# Patient Record
Sex: FEMALE | Race: WHITE | NOT HISPANIC OR LATINO | Employment: FULL TIME | URBAN - METROPOLITAN AREA
[De-identification: names, ages, dates, MRNs, and addresses within clinical notes are randomized per-mention and may not be internally consistent; named-entity substitution may affect disease eponyms.]

---

## 2017-03-03 ENCOUNTER — OFFICE VISIT - HEALTHEAST (OUTPATIENT)
Dept: FAMILY MEDICINE | Facility: CLINIC | Age: 32
End: 2017-03-03

## 2017-03-03 DIAGNOSIS — J32.9 SINUSITIS: ICD-10-CM

## 2017-03-13 ENCOUNTER — OFFICE VISIT - HEALTHEAST (OUTPATIENT)
Dept: FAMILY MEDICINE | Facility: CLINIC | Age: 32
End: 2017-03-13

## 2017-03-13 DIAGNOSIS — J01.90 ACUTE SINUSITIS: ICD-10-CM

## 2017-03-13 DIAGNOSIS — H65.90 SEROUS OTITIS MEDIA: ICD-10-CM

## 2017-05-01 ENCOUNTER — OFFICE VISIT - HEALTHEAST (OUTPATIENT)
Dept: FAMILY MEDICINE | Facility: CLINIC | Age: 32
End: 2017-05-01

## 2017-05-01 DIAGNOSIS — J32.9 SINUSITIS: ICD-10-CM

## 2017-05-01 DIAGNOSIS — Z87.09 HISTORY OF ASTHMA: ICD-10-CM

## 2017-05-01 DIAGNOSIS — Z87.19 HISTORY OF CROHN'S DISEASE: ICD-10-CM

## 2017-07-07 ENCOUNTER — OFFICE VISIT - HEALTHEAST (OUTPATIENT)
Dept: FAMILY MEDICINE | Facility: CLINIC | Age: 32
End: 2017-07-07

## 2017-07-07 DIAGNOSIS — G44.009 CLUSTER HEADACHE: ICD-10-CM

## 2017-10-17 ENCOUNTER — OFFICE VISIT - HEALTHEAST (OUTPATIENT)
Dept: FAMILY MEDICINE | Facility: CLINIC | Age: 32
End: 2017-10-17

## 2017-10-17 ENCOUNTER — COMMUNICATION - HEALTHEAST (OUTPATIENT)
Dept: TELEHEALTH | Facility: CLINIC | Age: 32
End: 2017-10-17

## 2017-10-17 DIAGNOSIS — Z87.09 HISTORY OF ASTHMA: ICD-10-CM

## 2017-10-17 DIAGNOSIS — Z00.00 ROUTINE GENERAL MEDICAL EXAMINATION AT A HEALTH CARE FACILITY: ICD-10-CM

## 2017-10-17 DIAGNOSIS — Z13.220 SCREENING CHOLESTEROL LEVEL: ICD-10-CM

## 2017-10-17 DIAGNOSIS — Z86.59 H/O: DEPRESSION: ICD-10-CM

## 2017-10-17 DIAGNOSIS — E55.9 VITAMIN D DEFICIENCY: ICD-10-CM

## 2017-10-17 DIAGNOSIS — Z12.4 CERVICAL CANCER SCREENING: ICD-10-CM

## 2017-10-17 DIAGNOSIS — Z87.19 HISTORY OF CROHN'S DISEASE: ICD-10-CM

## 2017-10-17 DIAGNOSIS — Z23 NEED FOR IMMUNIZATION AGAINST INFLUENZA: ICD-10-CM

## 2017-10-17 DIAGNOSIS — Z13.1 DIABETES MELLITUS SCREENING: ICD-10-CM

## 2017-10-17 LAB
CHOLEST SERPL-MCNC: 291 MG/DL
FASTING STATUS PATIENT QL REPORTED: YES
HDLC SERPL-MCNC: 39 MG/DL
LDLC SERPL CALC-MCNC: 212 MG/DL
TRIGL SERPL-MCNC: 202 MG/DL

## 2017-10-17 ASSESSMENT — MIFFLIN-ST. JEOR: SCORE: 1454.03

## 2017-10-18 ENCOUNTER — COMMUNICATION - HEALTHEAST (OUTPATIENT)
Dept: FAMILY MEDICINE | Facility: CLINIC | Age: 32
End: 2017-10-18

## 2017-11-06 ENCOUNTER — OFFICE VISIT - HEALTHEAST (OUTPATIENT)
Dept: FAMILY MEDICINE | Facility: CLINIC | Age: 32
End: 2017-11-06

## 2017-11-06 DIAGNOSIS — R06.2 WHEEZING: ICD-10-CM

## 2017-11-06 DIAGNOSIS — J01.90 ACUTE SINUSITIS: ICD-10-CM

## 2017-11-21 ENCOUNTER — OFFICE VISIT - HEALTHEAST (OUTPATIENT)
Dept: FAMILY MEDICINE | Facility: CLINIC | Age: 32
End: 2017-11-21

## 2017-11-21 ENCOUNTER — RECORDS - HEALTHEAST (OUTPATIENT)
Dept: ADMINISTRATIVE | Facility: OTHER | Age: 32
End: 2017-11-21

## 2017-11-21 DIAGNOSIS — J45.909 REACTIVE AIRWAY DISEASE: ICD-10-CM

## 2017-11-21 DIAGNOSIS — Z30.017 NEXPLANON INSERTION: ICD-10-CM

## 2017-11-21 DIAGNOSIS — Z87.19 HISTORY OF CROHN'S DISEASE: ICD-10-CM

## 2017-11-21 DIAGNOSIS — Z87.09 HISTORY OF ASTHMA: ICD-10-CM

## 2017-11-21 DIAGNOSIS — R05.3 PERSISTENT COUGH FOR 3 WEEKS OR LONGER: ICD-10-CM

## 2017-11-25 ENCOUNTER — COMMUNICATION - HEALTHEAST (OUTPATIENT)
Dept: FAMILY MEDICINE | Facility: CLINIC | Age: 32
End: 2017-11-25

## 2017-11-25 DIAGNOSIS — J45.31 MILD PERSISTENT REACTIVE AIRWAY DISEASE WITH ACUTE EXACERBATION: ICD-10-CM

## 2017-12-05 ENCOUNTER — COMMUNICATION - HEALTHEAST (OUTPATIENT)
Dept: FAMILY MEDICINE | Facility: CLINIC | Age: 32
End: 2017-12-05

## 2017-12-05 DIAGNOSIS — Z11.1 SCREENING-PULMONARY TB: ICD-10-CM

## 2017-12-08 ENCOUNTER — AMBULATORY - HEALTHEAST (OUTPATIENT)
Dept: LAB | Facility: CLINIC | Age: 32
End: 2017-12-08

## 2017-12-08 DIAGNOSIS — Z11.1 SCREENING-PULMONARY TB: ICD-10-CM

## 2017-12-11 ENCOUNTER — COMMUNICATION - HEALTHEAST (OUTPATIENT)
Dept: FAMILY MEDICINE | Facility: CLINIC | Age: 32
End: 2017-12-11

## 2018-08-27 ENCOUNTER — RECORDS - HEALTHEAST (OUTPATIENT)
Dept: LAB | Facility: HOSPITAL | Age: 33
End: 2018-08-27

## 2018-08-28 LAB
GAMMA INTERFERON BACKGROUND BLD IA-ACNC: 0.06 IU/ML
M TB IFN-G BLD-IMP: NEGATIVE
MITOGEN IGNF BCKGRD COR BLD-ACNC: 0 IU/ML
MITOGEN IGNF BCKGRD COR BLD-ACNC: 0.02 IU/ML
QTF INTERPRETATION: NORMAL
QTF MITOGEN - NIL: >10 IU/ML

## 2019-03-24 ENCOUNTER — OFFICE VISIT - HEALTHEAST (OUTPATIENT)
Dept: FAMILY MEDICINE | Facility: CLINIC | Age: 34
End: 2019-03-24

## 2019-03-24 DIAGNOSIS — M25.449 SWELLING OF FINGER JOINT, UNSPECIFIED LATERALITY: ICD-10-CM

## 2019-03-24 DIAGNOSIS — Z87.19 HISTORY OF CROHN'S DISEASE: ICD-10-CM

## 2019-03-25 ENCOUNTER — COMMUNICATION - HEALTHEAST (OUTPATIENT)
Dept: LAB | Facility: CLINIC | Age: 34
End: 2019-03-25

## 2019-03-26 ENCOUNTER — AMBULATORY - HEALTHEAST (OUTPATIENT)
Dept: FAMILY MEDICINE | Facility: CLINIC | Age: 34
End: 2019-03-26

## 2019-03-26 ENCOUNTER — AMBULATORY - HEALTHEAST (OUTPATIENT)
Dept: LAB | Facility: CLINIC | Age: 34
End: 2019-03-26

## 2019-03-26 DIAGNOSIS — M25.449 SWELLING OF FINGER JOINT, UNSPECIFIED LATERALITY: ICD-10-CM

## 2019-03-26 DIAGNOSIS — M79.645 PAIN OF FINGER OF LEFT HAND: ICD-10-CM

## 2019-03-26 DIAGNOSIS — Z87.19 HISTORY OF CROHN'S DISEASE: ICD-10-CM

## 2019-03-26 LAB
C REACTIVE PROTEIN LHE: <0.1 MG/DL (ref 0–0.8)
ERYTHROCYTE [SEDIMENTATION RATE] IN BLOOD BY WESTERGREN METHOD: 27 MM/HR (ref 0–20)
RHEUMATOID FACT SERPL-ACNC: <15 IU/ML (ref 0–30)

## 2019-03-28 ENCOUNTER — COMMUNICATION - HEALTHEAST (OUTPATIENT)
Dept: FAMILY MEDICINE | Facility: CLINIC | Age: 34
End: 2019-03-28

## 2019-03-28 LAB — ANA SER QL: 1.1 U

## 2019-04-01 ENCOUNTER — OFFICE VISIT - HEALTHEAST (OUTPATIENT)
Dept: FAMILY MEDICINE | Facility: CLINIC | Age: 34
End: 2019-04-01

## 2019-04-01 DIAGNOSIS — K50.00 CROHN'S DISEASE OF SMALL INTESTINE WITHOUT COMPLICATION (H): ICD-10-CM

## 2019-04-01 DIAGNOSIS — M25.50 MULTIPLE JOINT PAIN: ICD-10-CM

## 2019-04-01 DIAGNOSIS — G56.03 BILATERAL CARPAL TUNNEL SYNDROME: ICD-10-CM

## 2019-04-01 DIAGNOSIS — F33.0 DEPRESSION, MAJOR, RECURRENT, MILD (H): ICD-10-CM

## 2019-04-01 ASSESSMENT — MIFFLIN-ST. JEOR: SCORE: 1497.44

## 2019-04-05 ENCOUNTER — COMMUNICATION - HEALTHEAST (OUTPATIENT)
Dept: FAMILY MEDICINE | Facility: CLINIC | Age: 34
End: 2019-04-05

## 2019-04-05 DIAGNOSIS — M25.50 MULTIPLE JOINT PAIN: ICD-10-CM

## 2019-05-14 ENCOUNTER — RECORDS - HEALTHEAST (OUTPATIENT)
Dept: ADMINISTRATIVE | Facility: OTHER | Age: 34
End: 2019-05-14

## 2019-07-15 ENCOUNTER — RECORDS - HEALTHEAST (OUTPATIENT)
Dept: GENERAL RADIOLOGY | Facility: CLINIC | Age: 34
End: 2019-07-15

## 2019-07-15 DIAGNOSIS — M25.50 PAIN IN UNSPECIFIED JOINT: ICD-10-CM

## 2019-07-15 DIAGNOSIS — M54.50 LOW BACK PAIN: ICD-10-CM

## 2019-07-16 ENCOUNTER — RECORDS - HEALTHEAST (OUTPATIENT)
Dept: ADMINISTRATIVE | Facility: OTHER | Age: 34
End: 2019-07-16

## 2019-08-23 ENCOUNTER — COMMUNICATION - HEALTHEAST (OUTPATIENT)
Dept: FAMILY MEDICINE | Facility: CLINIC | Age: 34
End: 2019-08-23

## 2019-08-23 DIAGNOSIS — M25.50 MULTIPLE JOINT PAIN: ICD-10-CM

## 2019-10-02 ENCOUNTER — OFFICE VISIT (OUTPATIENT)
Dept: URGENT CARE | Facility: URGENT CARE | Age: 34
End: 2019-10-02
Payer: COMMERCIAL

## 2019-10-02 VITALS
WEIGHT: 182.5 LBS | SYSTOLIC BLOOD PRESSURE: 133 MMHG | HEART RATE: 102 BPM | BODY MASS INDEX: 32.33 KG/M2 | OXYGEN SATURATION: 98 % | TEMPERATURE: 98.6 F | DIASTOLIC BLOOD PRESSURE: 72 MMHG

## 2019-10-02 DIAGNOSIS — R21 RASH AND NONSPECIFIC SKIN ERUPTION: Primary | ICD-10-CM

## 2019-10-02 PROCEDURE — 99203 OFFICE O/P NEW LOW 30 MIN: CPT | Performed by: FAMILY MEDICINE

## 2019-10-02 RX ORDER — DOXYCYCLINE 100 MG/1
100 CAPSULE ORAL 2 TIMES DAILY
Qty: 20 CAPSULE | Refills: 0 | Status: SHIPPED | OUTPATIENT
Start: 2019-10-02 | End: 2019-10-12

## 2019-10-03 NOTE — PROGRESS NOTES
SUBJECTIVE:  Sherri Lynn is a 33 year old female who presents to the clinic today for possible skin infection.    Patient obtained flu vaccination of right shoulder yesterday.  This morning had some discomfort.  Patient noticed redness this evening.  Endorsed more painful and feels more hot and is very concerned that has cellulitis.  Patient states that the swelling did seem to improve but rash has gotten worse.  Denies any prior problems with flu vaccination.  Patient has been applying ice to area.  Unable to tolerate NSAID due to Crohn's.    Medical history - Crohn's    No past medical history on file.  Current Outpatient Medications   Medication Sig Dispense Refill     Acetaminophen (TYLENOL PO)        fluticasone (FLONASE) 50 MCG/ACT nasal spray Spray 2 sprays into both nostrils daily 16 g 0     fluticasone (FLOVENT HFA) 110 MCG/ACT inhaler Inhale 2 puffs into the lungs 2 times daily       Social History     Tobacco Use     Smoking status: Never Smoker   Substance Use Topics     Alcohol use: Yes       ROS:  Review of systems negative except as stated above.    EXAM:   /72   Pulse 102   Temp 98.6  F (37  C)   Wt 82.8 kg (182 lb 8 oz)   SpO2 98%   BMI 32.33 kg/m    GENERAL: alert, no acute distress.  SKIN: irregular pink patch on right shoulder, mild warmth.  No scaling, no blister or vesicle.  Mild tenderness   PSYCH: mentation appears normal and affect normal/bright    ASSESSMENT/PLAN:  (R21) Rash and nonspecific skin eruption  (primary encounter diagnosis)  Comment: right shoulder, s/p flu vaccination  Plan: doxycycline hyclate (VIBRAMYCIN) 100 MG capsule            Probable reaction to vaccination as just received injection yesterday.  Recommend to monitor for now, border marked.  Okay for tylenol for discomfort and benadryl for possible reaction.  RX Doxycycline printed and given to patient to fill and take if rash worsens in the next 2-3 days.    Follow up with primary provider if no  improvement of symptoms on antibiotic.    Isra Faye MD, MD  October 2, 2019 8:08 PM

## 2019-12-17 ENCOUNTER — OFFICE VISIT (OUTPATIENT)
Dept: URGENT CARE | Facility: URGENT CARE | Age: 34
End: 2019-12-17
Payer: COMMERCIAL

## 2019-12-17 VITALS
OXYGEN SATURATION: 99 % | HEART RATE: 103 BPM | DIASTOLIC BLOOD PRESSURE: 68 MMHG | SYSTOLIC BLOOD PRESSURE: 108 MMHG | TEMPERATURE: 98.7 F

## 2019-12-17 DIAGNOSIS — M19.90 INFLAMMATORY ARTHRITIS: Primary | ICD-10-CM

## 2019-12-17 DIAGNOSIS — M70.62 TROCHANTERIC BURSITIS OF LEFT HIP: ICD-10-CM

## 2019-12-17 PROCEDURE — 99214 OFFICE O/P EST MOD 30 MIN: CPT | Performed by: FAMILY MEDICINE

## 2019-12-17 RX ORDER — HYDROXYCHLOROQUINE SULFATE 200 MG/1
400 TABLET, FILM COATED ORAL 2 TIMES DAILY
COMMUNITY
Start: 2019-12-13 | End: 2021-11-18

## 2019-12-17 RX ORDER — PREDNISONE 20 MG/1
20 TABLET ORAL DAILY
Qty: 5 TABLET | Refills: 0 | Status: SHIPPED | OUTPATIENT
Start: 2019-12-17 | End: 2019-12-22

## 2019-12-17 RX ORDER — FLUTICASONE PROPIONATE 50 MCG
SPRAY, SUSPENSION (ML) NASAL
COMMUNITY
End: 2020-10-11

## 2019-12-18 NOTE — PROGRESS NOTES
SUBJECTIVE:  Chief Complaint   Patient presents with     Urgent Care     Musculoskeletal Problem     left hip pain that radiates down her leg     Sherri Lynn is a 33 year old female with a history of unspecific inflammatory arthritis who presents with a chief complaint of left hip pain, both in the joint and along the lateral aspect of the leg.  Symptoms began a few days ago and have been worsening    Context:  Injury:No.  Pain exacerbated by walking and movement Relieved by nothing.  She treated it initially with essential oils topically (including birch, which contains natural salicylates).  She also takes Plaquenil. This is not the first time this type of injury has occurred to this patient, but usually it affects her hands and her knees..     No past medical history on file.   Crohn's disease    Social History     Tobacco Use     Smoking status: Never Smoker     Smokeless tobacco: Never Used   Substance Use Topics     Alcohol use: Yes       ROS:  No fevers.  Mild stomach upset, but just returned from a trip to [place].  Probiotics seem to be helping the GI upset.    EXAM:   /68   Pulse 103   Temp 98.7  F (37.1  C)   SpO2 99%   M/S Exam:L hip does not have any significant decrease in ROM, some pain with external rotation at the hip.  Tender in the lower portion of the IT band as well as over the trochanteric bursa on the left.  Mild L pyriformis tenderness as well.  Full ROM of L knee.  GENERAL APPEARANCE: healthy, alert and no distress  NEURO: Normal strength and tone, sensory exam grossly normal, mentation intact and speech normal    X-RAY was not done.    ASSESSMENT:     Encounter Diagnosis   Name Primary?     Inflammatory arthritis Yes       PLAN:  Prednisone burst:  20 mg daily x 5 days.  Continue using anti-inflammatory EO blend topically.  Encouraged pt to add chamomile as additional anti-inflammatory component.  Follow up with primary MD and rheum to let them know about flare.

## 2019-12-18 NOTE — PATIENT INSTRUCTIONS
Prednisone 20 mg daily for 5 days.    Follow up with regular doctor if possible over the next week.

## 2019-12-18 NOTE — NURSING NOTE
Chief Complaint   Patient presents with     Urgent Care     Musculoskeletal Problem     left hip pain that radiates down her leg        S JOSE EDUARDO, CMA

## 2019-12-23 ENCOUNTER — OFFICE VISIT (OUTPATIENT)
Dept: URGENT CARE | Facility: URGENT CARE | Age: 34
End: 2019-12-23
Payer: COMMERCIAL

## 2019-12-23 VITALS — WEIGHT: 189 LBS | OXYGEN SATURATION: 99 % | HEART RATE: 77 BPM | BODY MASS INDEX: 33.48 KG/M2

## 2019-12-23 DIAGNOSIS — R05.9 COUGH: ICD-10-CM

## 2019-12-23 DIAGNOSIS — J40 BRONCHITIS: Primary | ICD-10-CM

## 2019-12-23 LAB
FLUAV+FLUBV AG SPEC QL: NEGATIVE
FLUAV+FLUBV AG SPEC QL: NEGATIVE
SPECIMEN SOURCE: NORMAL

## 2019-12-23 PROCEDURE — 87804 INFLUENZA ASSAY W/OPTIC: CPT | Performed by: PHYSICIAN ASSISTANT

## 2019-12-23 PROCEDURE — 99214 OFFICE O/P EST MOD 30 MIN: CPT | Performed by: NURSE PRACTITIONER

## 2019-12-23 RX ORDER — ALBUTEROL SULFATE 90 UG/1
2 AEROSOL, METERED RESPIRATORY (INHALATION) EVERY 4 HOURS PRN
Qty: 1 INHALER | Refills: 0 | Status: SHIPPED | OUTPATIENT
Start: 2019-12-23 | End: 2021-11-18

## 2019-12-23 RX ORDER — IPRATROPIUM BROMIDE AND ALBUTEROL SULFATE 2.5; .5 MG/3ML; MG/3ML
1 SOLUTION RESPIRATORY (INHALATION) EVERY 4 HOURS PRN
Qty: 1 BOX | Refills: 0 | Status: SHIPPED | OUTPATIENT
Start: 2019-12-23 | End: 2021-11-18

## 2019-12-23 RX ORDER — PREDNISONE 20 MG/1
40 TABLET ORAL DAILY
Qty: 10 TABLET | Refills: 0 | Status: SHIPPED | OUTPATIENT
Start: 2019-12-23 | End: 2019-12-28

## 2019-12-23 ASSESSMENT — ENCOUNTER SYMPTOMS
RHINORRHEA: 0
HEADACHES: 0
WHEEZING: 1
VOMITING: 0
MYALGIAS: 1
NAUSEA: 0
LIGHT-HEADEDNESS: 1
SORE THROAT: 1
DIARRHEA: 0
COUGH: 1
FEVER: 1

## 2019-12-23 NOTE — PATIENT INSTRUCTIONS
Albuterol as needed every 4 hours for cough, wheeze, or shortness of breath  Prednisone daily for 5 days  Push Fluids  Plenty of rest  Tylenol and ibuprofen to help with pain or fever  Humidified air can help with congestion  Nasal saline or Flonase nasal spray to help with congestion  Salt water gargles, anesthetic throat spray, or lozenges to help with sore throat.

## 2019-12-23 NOTE — PROGRESS NOTES
SUBJECTIVE:   Sherri Lynn is a 34 year old female presenting with a chief complaint of   Chief Complaint   Patient presents with     Urgent Care     URI     cough, wheezing, fever-  x 3.5 days       She is an established patient of [facility].    URI Adult    Onset of symptoms was 3 day(s) ago.  Course of illness is worsening.    Severity moderate  Current and Associated symptoms: fever, cough - non-productive, wheezing, ear pain left and body aches  Treatment measures tried include Inhaler (name: albuterol). Tylenol cough/cold, prednisone  Predisposing factors include ill contact: Work and HX of asthma.        Review of Systems   Constitutional: Positive for fever.   HENT: Positive for ear pain (left) and sore throat. Negative for congestion and rhinorrhea.    Respiratory: Positive for cough and wheezing.    Gastrointestinal: Negative for diarrhea, nausea and vomiting.   Musculoskeletal: Positive for myalgias.   Skin: Negative for rash.   Neurological: Positive for light-headedness. Negative for headaches.       History reviewed. No pertinent past medical history.  History reviewed. No pertinent family history.  Current Outpatient Medications   Medication Sig Dispense Refill     albuterol (PROAIR HFA/PROVENTIL HFA/VENTOLIN HFA) 108 (90 Base) MCG/ACT inhaler Inhale 2 puffs into the lungs every 4 hours as needed for shortness of breath / dyspnea or wheezing 1 Inhaler 0     ipratropium - albuterol 0.5 mg/2.5 mg/3 mL (DUONEB) 0.5-2.5 (3) MG/3ML neb solution Take 1 vial (3 mLs) by nebulization every 4 hours as needed for shortness of breath / dyspnea or wheezing 1 Box 0     predniSONE (DELTASONE) 20 MG tablet Take 2 tablets (40 mg) by mouth daily for 5 days 10 tablet 0     Acetaminophen (TYLENOL PO)        fluticasone (FLONASE) 50 MCG/ACT nasal spray daily as needed.       fluticasone (FLONASE) 50 MCG/ACT nasal spray Spray 2 sprays into both nostrils daily 16 g 0     fluticasone (FLOVENT HFA) 110 MCG/ACT inhaler  Inhale 2 puffs into the lungs 2 times daily       hydroxychloroquine (PLAQUENIL) 200 MG tablet        Nivlub-OtYhv-HcVkvs-FA-Omega (MULTIVITAMIN/MINERALS PO)        Social History     Tobacco Use     Smoking status: Never Smoker     Smokeless tobacco: Never Used   Substance Use Topics     Alcohol use: Yes       OBJECTIVE  BP (P) 112/82   Pulse 77   Temp (P) 98.1  F (36.7  C) (Tympanic)   Resp (P) 14   Wt 85.7 kg (189 lb)   SpO2 99%   BMI 33.48 kg/m      Physical Exam  Vitals signs and nursing note reviewed.   Constitutional:       Appearance: Normal appearance. She is well-developed.   HENT:      Head: Normocephalic and atraumatic.      Right Ear: Ear canal and external ear normal. A middle ear effusion is present.      Left Ear: Ear canal and external ear normal. A middle ear effusion is present.      Nose: Congestion and rhinorrhea present.      Right Sinus: No maxillary sinus tenderness or frontal sinus tenderness.      Left Sinus: No maxillary sinus tenderness or frontal sinus tenderness.      Mouth/Throat:      Pharynx: Posterior oropharyngeal erythema present. No oropharyngeal exudate.   Eyes:      Extraocular Movements: Extraocular movements intact.      Conjunctiva/sclera: Conjunctivae normal.      Pupils: Pupils are equal, round, and reactive to light.   Neck:      Musculoskeletal: Normal range of motion and neck supple.   Cardiovascular:      Rate and Rhythm: Normal rate and regular rhythm.      Heart sounds: Normal heart sounds.   Pulmonary:      Effort: Pulmonary effort is normal.      Breath sounds: Normal breath sounds and air entry.   Lymphadenopathy:      Head:      Right side of head: No submandibular or tonsillar adenopathy.      Left side of head: No submandibular or tonsillar adenopathy.      Cervical: No cervical adenopathy.   Skin:     General: Skin is warm and dry.   Neurological:      Mental Status: She is alert and oriented to person, place, and time.   Psychiatric:         Behavior:  Behavior is cooperative.         Labs:  Results for orders placed or performed in visit on 12/23/19 (from the past 24 hour(s))   Influenza A/B antigen   Result Value Ref Range    Influenza A/B Agn Specimen Nasal     Influenza A Negative NEG^Negative    Influenza B Negative NEG^Negative         ASSESSMENT:      ICD-10-CM    1. Bronchitis J40 predniSONE (DELTASONE) 20 MG tablet   2. Cough R05 Influenza A/B antigen     albuterol (PROAIR HFA/PROVENTIL HFA/VENTOLIN HFA) 108 (90 Base) MCG/ACT inhaler     ipratropium - albuterol 0.5 mg/2.5 mg/3 mL (DUONEB) 0.5-2.5 (3) MG/3ML neb solution        Medical Decision Making:    Differential Diagnosis:  URI Adult/Peds:  Asthma exacerbation, Bronchitis-viral, Influenza, Pneumonia and Viral upper respiratory illness    Serious Comorbid Conditions:  Adult:  Asthma    PLAN:  Discussed with patient that influenza was negative. Favor a viral bronchitis. Will treat with prednisone daily for 5 days. Will refill albuterol inhaler and duonebs. Additional symptomatic treatment recommendations discussed. Education was added to AVS. Patient was agreeable to plan and verbalized understanding.     Followup:    If not improving in 5-7 days or if condition worsens, follow up with your Primary Care Provider    Patient Instructions   Albuterol as needed every 4 hours for cough, wheeze, or shortness of breath  Prednisone daily for 5 days  Push Fluids  Plenty of rest  Tylenol and ibuprofen to help with pain or fever  Humidified air can help with congestion  Nasal saline or Flonase nasal spray to help with congestion  Salt water gargles, anesthetic throat spray, or lozenges to help with sore throat.

## 2019-12-29 ENCOUNTER — ANCILLARY PROCEDURE (OUTPATIENT)
Dept: GENERAL RADIOLOGY | Facility: CLINIC | Age: 34
End: 2019-12-29
Attending: PHYSICIAN ASSISTANT
Payer: COMMERCIAL

## 2019-12-29 ENCOUNTER — OFFICE VISIT (OUTPATIENT)
Dept: URGENT CARE | Facility: URGENT CARE | Age: 34
End: 2019-12-29
Payer: COMMERCIAL

## 2019-12-29 VITALS — HEART RATE: 93 BPM | OXYGEN SATURATION: 99 % | WEIGHT: 192.6 LBS | TEMPERATURE: 98.7 F | BODY MASS INDEX: 34.12 KG/M2

## 2019-12-29 DIAGNOSIS — J20.9 ACUTE BRONCHITIS WITH SYMPTOMS > 10 DAYS: Primary | ICD-10-CM

## 2019-12-29 DIAGNOSIS — J02.9 SORE THROAT: ICD-10-CM

## 2019-12-29 LAB
DEPRECATED S PYO AG THROAT QL EIA: NORMAL
SPECIMEN SOURCE: NORMAL

## 2019-12-29 PROCEDURE — 71046 X-RAY EXAM CHEST 2 VIEWS: CPT

## 2019-12-29 PROCEDURE — 87081 CULTURE SCREEN ONLY: CPT | Performed by: PHYSICIAN ASSISTANT

## 2019-12-29 PROCEDURE — 87880 STREP A ASSAY W/OPTIC: CPT | Performed by: PHYSICIAN ASSISTANT

## 2019-12-29 PROCEDURE — 99213 OFFICE O/P EST LOW 20 MIN: CPT | Performed by: PHYSICIAN ASSISTANT

## 2019-12-29 RX ORDER — DOXYCYCLINE 100 MG/1
100 CAPSULE ORAL 2 TIMES DAILY
Qty: 14 CAPSULE | Refills: 0 | Status: SHIPPED | OUTPATIENT
Start: 2019-12-29 | End: 2020-01-05

## 2019-12-29 NOTE — PROGRESS NOTES
SUBJECTIVE:   Sherri Lynn is a 34 year old female presenting with a chief complaint of No chief complaint on file.      She is an established patient of [facility].  Patient presents with complaints of left ear pain, ST, cough x 10 days.  Patient states she has been giving herself neb treatments and was here on 12/23 for same.  Last neb treatment at 10 am today.  Unknown fevers.  ST x 1 day.      URI Adult    Onset of symptoms was 10 day(s) ago.  Course of illness is waxing and waning.    Severity moderate  Current and Associated symptoms: cough - non-productive, sore throat and fatigue  Treatment measures tried include Nebulizer (name: albuterol).  Predisposing factors include None.        Review of Systems    No past medical history on file.  No family history on file.  Current Outpatient Medications   Medication Sig Dispense Refill     Acetaminophen (TYLENOL PO)        albuterol (PROAIR HFA/PROVENTIL HFA/VENTOLIN HFA) 108 (90 Base) MCG/ACT inhaler Inhale 2 puffs into the lungs every 4 hours as needed for shortness of breath / dyspnea or wheezing 1 Inhaler 0     fluticasone (FLONASE) 50 MCG/ACT nasal spray daily as needed.       fluticasone (FLONASE) 50 MCG/ACT nasal spray Spray 2 sprays into both nostrils daily 16 g 0     fluticasone (FLOVENT HFA) 110 MCG/ACT inhaler Inhale 2 puffs into the lungs 2 times daily       hydroxychloroquine (PLAQUENIL) 200 MG tablet        ipratropium - albuterol 0.5 mg/2.5 mg/3 mL (DUONEB) 0.5-2.5 (3) MG/3ML neb solution Take 1 vial (3 mLs) by nebulization every 4 hours as needed for shortness of breath / dyspnea or wheezing 1 Box 0     Otcwrq-NyRkm-EoJvyd-FA-Omega (MULTIVITAMIN/MINERALS PO)        Social History     Tobacco Use     Smoking status: Never Smoker     Smokeless tobacco: Never Used   Substance Use Topics     Alcohol use: Yes       OBJECTIVE  Pulse 93   Temp 98.7  F (37.1  C)   Wt 87.4 kg (192 lb 9.6 oz)   SpO2 99%   BMI 34.12 kg/m      Physical Exam    Labs:  No  results found for this or any previous visit (from the past 24 hour(s)).    X-Ray was done, my findings are: NAD  Xrays personally viewed by myself.    ASSESSMENT:      ICD-10-CM    1. Sore throat J02.9 Strep, Rapid Screen     CANCELED: Influenza A/B antigen        Medical Decision Making:    Differential Diagnosis:  URI Adult/Peds:  Acute left otitis media, Bronchitis-viral and Viral upper respiratory illness, bronchitis      Serious Comorbid Conditions:  Adult:  None    PLAN:    URI Adult:  Tylenol, Ibuprofen, Fluids, Rest and doxycycline.  Blood work unable to be obtained.      Followup:    If not improving or if condition worsens, follow up with your Primary Care Provider, If not improving or if conditions worsens over the next 12-24 hours, go to the Emergency Department    There are no Patient Instructions on file for this visit.

## 2019-12-29 NOTE — PATIENT INSTRUCTIONS

## 2019-12-30 LAB
BACTERIA SPEC CULT: NORMAL
SPECIMEN SOURCE: NORMAL

## 2020-01-11 ENCOUNTER — COMMUNICATION - HEALTHEAST (OUTPATIENT)
Dept: FAMILY MEDICINE | Facility: CLINIC | Age: 35
End: 2020-01-11

## 2020-01-11 DIAGNOSIS — M25.50 MULTIPLE JOINT PAIN: ICD-10-CM

## 2020-02-08 ENCOUNTER — HEALTH MAINTENANCE LETTER (OUTPATIENT)
Age: 35
End: 2020-02-08

## 2020-03-23 ENCOUNTER — VIRTUAL VISIT (OUTPATIENT)
Dept: FAMILY MEDICINE | Facility: OTHER | Age: 35
End: 2020-03-23

## 2020-03-23 NOTE — PROGRESS NOTES
"Date: 2020 07:10:32  Clinician: Reyna Baugh  Clinician NPI: [id number]  Patient: Sherri Lynn  Patient : 1985  Patient Address: [address], [place] [address]  Patient Phone: [phone]  Visit Protocol: URI  Patient Summary:  Sherri is a 34 year old ( : 1985 ) female who initiated a Visit for COVID-19 (Coronavirus) evaluation and screening. When asked the question \"Please sign me up to receive news, health information and promotions from [employer].\", Sherri responded \"No\".    Sherri states her symptoms started 1-2 days ago.   Her symptoms consist of facial pain or pressure, myalgia, wheezing, a sore throat, a cough, nasal congestion, malaise, chills, and a headache. Sherri also feels feverish.   Symptom details     Nasal secretions: The color of her mucus is green.    Cough: Sherri coughs a few times an hour and her cough is not more bothersome at night. Phlegm does not come into her throat when she coughs. She does not believe her cough is caused by post-nasal drip.     Sore throat: Sherri reports having moderate throat pain (4-6 on a 10 point pain scale), does not have exudate on her tonsils, and can swallow liquids. The lymph nodes in her neck are not enlarged. A rash has not appeared on the skin since the sore throat started.     Temperature: Her current temperature is 98.6 degrees Fahrenheit.     Wheezing: Sherri has been diagnosed with asthma. The wheezing interferes with her normal daily activities.    Facial pain or pressure: The facial pain or pressure does not feel worse when bending or leaning forward.     Headache: She states the headache is mild (1-3 on a 10 point pain scale).      Sherri denies having ear pain, rhinitis, enlarged lymph nodes, and teeth pain. She also denies taking antibiotic medication for the symptoms and having recent facial or sinus surgery in the past 60 days.   Precipitating events  Within the past week, Sherri has not been exposed " to someone with strep throat. She has recently been exposed to someone with influenza. Sherri has been in close contact with the following high risk individuals: immunocompromised people, adults 65 or older, and people with asthma, heart disease or diabetes.   Pertinent COVID-19 (Coronavirus) information  Sherri has not traveled internationally or to the areas where COVID-19 (Coronavirus) is widespread, including cruise ship travel in the last 14 days before the start of her symptoms.   Sherri has not had a close contact with a laboratory-confirmed COVID-19 patient within 14 days of symptom onset. She has had a close contact with a suspected COVID-19 patient within 14 days of symptom onset. 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   Triage Point(s) temporarily suspended for COVID-19 (Coronavirus) screening  Sherri reported the following symptoms which were previously protocol referral points. These protocol referral points have temporarily been removed for purposes of COVID-19 (Coronavirus) screening.     Difficulty breathing even when resting and can only speak in phrase(s)    Wheezing that keeps Sherri from doing daily activities     Pertinent medical history  Sherri had 2 sinus infections within the past year.   Sherri does not get yeast infections when she takes antibiotics.   Sherri needs a return to work/school note.   Weight: 170 lbs   Sherri does not smoke or use smokeless tobacco.   She denies pregnancy and denies breastfeeding. She does not menstruate.   Weight: 170 lbs  A synchronous phone visit was initiated by the provider for the following reason: difficulty breathing // wheezing    MEDICATIONS: Plaquenil oral, ALLERGIES: Bactrim, Augmentin, Keflex, Influenza Virus Vaccines, Imuran  Clinician Response:  Dear  Sherri,   Based on the information you have provided, you do have symptoms that are consistent with Coronavirus (COVID-19).  The coronavirus causes mild to severe respiratory illness with the most common symptoms including fever, cough and difficulty breathing. Unfortunately, many viruses cause similar symptoms and it can be difficult to distinguish between viruses, especially in mild cases, so we are presuming that anyone with cough or fever has coronavirus at this time.  Coronavirus/COVID-19 has reached the point of community spread in [place], meaning that we are finding the virus in people with no known exposure risk for lance the virus. Given the increasing commonness of coronavirus in the community we are no longer testing patients who are not critically ill.  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  &gt;&gt; I think you need to discuss this with employee health as your patient population is high risk for complications (geriatric) as to when you would return, I would think either pursue testing (or if a patient you previously worked with&nbsp;intimately&nbsp;tested positive, you should wait 14 days). If there were no positives at your facility, the 7+3 would work.&nbsp;  For everyone else who has cough or fever, you should assume you are infected with coronavirus (you did mention exposure to a PUI / patient). Since you may not be tested but have symptoms that may be consistent with coronavirus, I would for sure recommend self quarantine (duration to be determined).&nbsp;  Medication information  Because you have a viral infection, antibiotics will not help you get better. Treating a viral infection with antibiotics could actually make you feel worse.  Unless you are allergic to the over-the-counter medication(s) below, I recommend using:    Acetaminophen (Tylenol or store brand) oral tablet. Take 1-2 tablets by  mouth every 4-6 hours to help with the discomfort.   An antihistamine such as Benadryl, Claritin, or store brand.  A decongestant such as Sudafed PE or store brand.  Guaifenesin + dextromethorphan (Robitussin DM, Mucinex DM, or store brand).  Saline nasal spray ([place] or store brand). Use 1-2 sprays in each nostril 3 times a day as needed for congestion.  Oxymetazoline (Afrin or store brand) nasal spray. Use 1 spray in each nostril 2 times a day for a maximum of 3 days. Using this medication more frequently or longer than recommended may cause nasal congestion to reoccur or worsen. Sinus pressure occurs when the tissues lining your sinuses become swollen and inflamed. Afrin nasal spray decreases the swelling to provide the quickest and most effective relief from sinus pressure.   Over-the-counter medications do not require a prescription. Ask the pharmacist if you have any questions.  Self care  The following tips will keep you as comfortable as possible while you recover:   Rest, Take naps, Avoid vigorous physical activity  Drink plenty of water and other liquids  Use throat lozenges  Gargle with warm salt water (1/4 teaspoon of salt per 8 ounce glass of water)  Suck on frozen items such as popsicles or ice cubes  Drink hot tea with lemon and honey  Take a spoonful of honey to reduce your cough   The CDC recommends you stay in self-isolation until these three things have happened:     You have had no fever for at least 72 hours (that is three full days of no fever without the use of medicine that reduces fevers)    AND   Other symptoms have improved (for example, when your cough or shortness of breath have improved)   AND   At least 7 days have passed since your symptoms first appeared.   How to Isolate:   Isolate yourself at home.  Do Not allow any visitors  Do Not go to work or school  Do Not go to [employer]ist,  centers, shopping, or other public places.  Do Not shake hands.  Avoid close contact with others  (hugging, kissing).   Protect Others:   Cover Your Mouth and Nose with a mask, disposable tissue or wash cloth to avoid spreading germs to others.  Wash your hands and face frequently with soap and water.   We know it can be scary to hear that you might have COVID-19. Our team can help track your symptoms and make sure you are doing ok over the next two weeks using a program called [employer] to keep in touch. When you receive an email from [employer], please consider enrolling in our monitoring program. There is no cost to you for monitoring. Here is a URL where you can learn more: http://www.JOOR/572065  Managing Symptoms:   At this time, we primarily recommend Tylenol (Acetaminophen) for fever or pain. If you have liver or kidney problems, contact your primary care provider for instructions on use of tylenol. Adults can take 650 mg (two 325 mg pills) by mouth every 4-6 hours as needed OR 1,000 mg (two 500 mg pills) every 8 hours as needed. MAXIMUM DAILY DOSE: 3,000mg. For children, refer to dosing on bottle based on age or weight.   If you develop significant shortness of breath that prevents you from doing normal activities, please call 911 or proceed to the nearest emergency room and alert them immediately that you have been in self-isolation for possible coronavirus.  For more information about COVID19 and options for caring for yourself at home, please visit the CDC website at https://www.cdc.gov/coronavirus/2019-ncov/about/steps-when-sick.htmlFor more options for care at [facility], please visit our website at [email or web address]       Diagnosis: Cough  Diagnosis ICD: R05  Triage Notes: I reviewed the patient's history, verified their identity, and explained the Visit process.    Spoke with Sherri on the phone. 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 She's able to speak without difficulty but sounds quite congested. Reports difficulty breathing,  asthmatic with recent bronchitis a few months ago. She reports using her inhaler regularly, would like to use her nebulizer because she lives alone however she needs a new nebulizer chamber. I recommended she or a family member contact a local pharmacy // medical supply to pick this up for her. She is agreeable to signing up for the online patient follow up program. She has not yet contacted her employee health (through [employer]). We reviewed current [place]/[employer] recommendations 7 +/- 3 versus 14 days. I felt it's judgement call really based the severity of her symptoms an actual exposure. She reports seeing many many patients in the nursing homes // and also with returning back to work in that high risk group, probably 14 days is appropriate. However, it will be determined by occupational health if she needs / hopefully can get testing with the nature of her work / population.  Synchronous Triage: phone, status: completed, duration: 268 seconds

## 2020-03-24 ENCOUNTER — OFFICE VISIT - HEALTHEAST (OUTPATIENT)
Dept: FAMILY MEDICINE | Facility: CLINIC | Age: 35
End: 2020-03-24

## 2020-03-24 DIAGNOSIS — R05.9 COUGH: ICD-10-CM

## 2020-03-29 ENCOUNTER — VIRTUAL VISIT (OUTPATIENT)
Dept: FAMILY MEDICINE | Facility: OTHER | Age: 35
End: 2020-03-29

## 2020-03-30 ENCOUNTER — OFFICE VISIT - HEALTHEAST (OUTPATIENT)
Dept: FAMILY MEDICINE | Facility: CLINIC | Age: 35
End: 2020-03-30

## 2020-03-30 DIAGNOSIS — R05.9 COUGH: ICD-10-CM

## 2020-03-30 NOTE — PROGRESS NOTES
"Date: 2020 22:46:57  Clinician: Melisa Wagner  Clinician NPI: [id number]  Patient: Sherri Lynn  Patient : 1985  Patient Address: [address], [place] [address]  Patient Phone: [phone]  Visit Protocol: URI  Patient Summary:  Sherri is a 34 year old ( : 1985 ) female who initiated a Visit for cold, sinus infection, or influenza. When asked the question \"Please sign me up to receive news, health information and promotions from [employer].\", Sherri responded \"No\".    Sherri states her symptoms started gradually 7-9 days ago. After her symptoms started, they improved and then got worse again.   Her symptoms consist of a headache, facial pain or pressure, myalgia, chills, a sore throat, nasal congestion, malaise, and enlarged lymph nodes. She is experiencing mild difficulty breathing with activities but can speak normally in full sentences.   Symptom details     Nasal secretions: The color of her mucus is green.    Sore throat: Sherri reports having moderate throat pain (4-6 on a 10 point pain scale), has exudate on her tonsils, and can swallow liquids. The lymph nodes in her neck are enlarged. A rash has not appeared on the skin since the sore throat started.     Facial pain or pressure: The facial pain or pressure feels worse when bending over or leaning forward.     Headache: She states the headache is mild (1-3 on a 10 point pain scale).      Sherri denies having ear pain, rhinitis, teeth pain, fever, wheezing, and cough. She also denies taking antibiotic medication for the symptoms and having recent facial or sinus surgery in the past 60 days.   Precipitating events  Sherri is not sure if she has been exposed to someone with strep throat. She has recently been exposed to someone with influenza. Sherri has been in close contact with the following high risk individuals: people with asthma, heart disease or diabetes, adults 65 or older, and immunocompromised people.   " Pertinent COVID-19 (Coronavirus) information  Sherri has not traveled internationally or to the areas where COVID-19 (Coronavirus) is widespread, including cruise ship travel in the last 14 days before the start of her symptoms.   Sherri has not had a close contact with a laboratory-confirmed COVID-19 patient within 14 days of symptom onset. She has had a close contact with a suspected COVID-19 patient within 14 days of symptom onset. 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   Triage Point(s) temporarily suspended for COVID-19 (Coronavirus) screening  Sherri reported the following symptoms which were previously protocol referral points. These protocol referral points have temporarily been removed for purposes of COVID-19 (Coronavirus) screening.   Meets at least 3/5 centor score criteria     Swollen lymph nodes    Exudate on tonsils    Absence of cough         Pertinent medical history  Sherri had 2 sinus infections within the past year.   Sherri does not get yeast infections when she takes antibiotics.   Sherri needs a return to work/school note.   Weight: 170 lbs   Sherri does not smoke or use smokeless tobacco.   She denies pregnancy and denies breastfeeding. She does not menstruate.   Additional information as reported by the patient (free text): I have red spots on my palate and this sore throat has worsened over the past 24 hours   Weight: 170 lbs    MEDICATIONS: Plaquenil oral, ALLERGIES: Imuran, Influenza Virus Vaccines, Keflex, Augmentin, Bactrim  Clinician Response:  Dear Sherri,  Based on the information provided, you have acute bacterial sinusitis, also known as a sinus infection. Sinus infections are caused by bacteria or a virus and  symptoms are almost always identical. The difference between the 2 types of infections is timing.  Sinus infections start as viral infections and symptoms improve on their own in about 7 days. If symptoms have not improved after 7 days or have even worsened, a bacterial infection may have developed.  Medication information  I am prescribing:     Doxycycline hyclate 100 mg oral tablet. Take 1 tablet by mouth 2 times per day for 7 days. There are no refills with this prescription.   Certain antibiotics such as Doxycycline, levofloxacin, and moxifloxacin can cause your skin to be more sensitive to the sun. Exposure to the sun when taking these antibiotics may cause skin rash, itching, redness, or a severe sunburn. Be sure to avoid prolonged or direct exposure to the sun, especially between 10 am and 3 pm, if possible. Wear protective clothing and use sunscreen of SPF 30 or higher when you are outdoors.  Yeast infections can be a common side effect of antibiotics. The most common symptom of a yeast infection is itchiness in and around the vagina. Other signs and symptoms include burning, redness, or a thick, white vaginal discharge that looks like cottage cheese and does not have a bad smell.  If you become pregnant during this course of treatment, stop taking the medication and contact your primary care provider.  Unless you are allergic to the over-the-counter medication(s) below, I recommend using:     Acetaminophen (Tylenol or store brand) oral tablet. Take 1-2 tablets by mouth every 4-6 hours to help with the discomfort.   Over-the-counter medications do not require a prescription. Ask the pharmacist if you have any questions.  Self care  Steps you can take to be as comfortable as possible:     Rest.    Drink plenty of fluids.    Take a warm shower to loosen congestion    Use a cool-mist humidifier.    Use throat lozenges.    Suck on frozen items such as popsicles.    Drink hot tea with lemon and honey.    Gargle  with warm salt water (1/4 teaspoon of salt per 8 ounce glass of water).     When to seek care  Please be seen in a clinic or urgent care if any of the following occur:     Symptoms do not start to improve after 3 days of treatment    New symptoms develop, or symptoms become worse     Please be seen in a clinic or urgent care if new symptoms develop, or symptoms become worse.  It is possible to have an allergic reaction to an antibiotic even if you have not had one in the past. If you notice a new rash, significant swelling, or difficulty breathing, stop taking this medication immediately and go to a clinic or urgent care.  Call 911 or go to the emergency room if you feel that your throat is closing off, you suddenly develop a rash, you are unable to swallow fluids, you are drooling, or you are having difficulty breathing.  COVID-19 (Coronavirus) General Information  With the increase in the number of COVID-19 (Coronavirus) cases, we understand you may have some questions. Below is some helpful information on COVID-19 (Coronavirus).  How can I protect myself and others from the COVID-19 (Coronavirus)?  Because there is currently no vaccine to prevent infection, the best way to protect yourself is to avoid being exposed to this virus. Put distance between yourself and other people if COVID-19 (Coronavirus) is spreading in your community. The virus is thought to spread mainly from person-to-person.     Between people who are in close contact with one another (within about 6 about) for a prolonged period (10 minutes or longer).    Through respiratory droplets produced when an infected person coughs or sneezes.     The CDC recommends the following additional steps to protect yourself and others:     Wash your hands often with soap and water for at least 20 seconds, especially after blowing your nose, coughing, or sneezing; going to the bathroom; and before eating or preparing food.  Use an alcohol-based hand   that contains at least 60 percent alcohol if soap and water are not available.        Avoid touching your eyes, nose and mouth with unwashed hands.    Avoid close contact with people who are sick.    Stay home when you are sick.    Cover your cough or sneeze with a tissue, then throw the tissue in the trash.    Clean and disinfect frequently touched objects and surfaces.     You can help stop COVID-19 (Coronavirus) by knowing the signs and symptoms:     Fever    Cough    Shortness of breath     Contact your healthcare provider if   Develop symptoms   AND   Have been in close contact with a person known to have COVID-19 (Coronavirus) or live in or have recently traveled from an area with ongoing spread of COVID-19 (Coronavirus). Call ahead before you go to a doctor's office or emergency room. Tell them about your recent travel and your symptoms.   For the most up to date information, visit the CDC's website.  Self-monitoring  Self-monitoring means people should monitor themselves for fever by taking their temperatures twice a day and remain alert for a cough or difficulty breathing.  It is important to check your health two times each day for 14 days after a potential exposure to a person with COVID-19 (Coronavirus) or after travel from a location where COVID-19 (Coronavirus) is widespread. If you have been exposed to a person with COVID-19 (Coronavirus), it may take up to 14 days to know if you will get sick. Follow the steps below to check and record your health.     Take your temperature with a thermometer twice a day, once in the morning and once in the evening, and watch for a cough or difficulty breathing for 14 days.    Write down your temperature and any COVID-19 symptoms you may have: feeling feverish, coughing, or difficulty breathing.    Stay home from work or school.    Do not take public transportation, taxis, or ride-shares.    Avoid crowded places (such as shopping centers and movie theaters) and limit  your activities in public.    Keep your distance from others (about 6 feet or 2 meters).    If you get sick with fever, cough, or trouble breathing, contact your healthcare provider and tell them about your recent travel and/or your symptoms.    If you need to seek medical care for other reasons, such as dialysis, call ahead to your doctor and tell them about your recent travel.     Steps to help prevent the spread of COVID-19 (Coronavirus) if you are sick  If you are sick with COVID-19 (Coronavirus) or suspect you are infected with the virus that causes COVID-19 (Coronavirus), follow the steps below to help prevent the disease from spreading&nbsp;to people in your home and community.     Stay home except to get medical care. Home isolation may be started in consultation with your healthcare clinician.    Separate yourself from other people and animals in your home.    Call ahead before visiting your doctor if you have a medical appointment.    Wear a facemask when you are around other people.    Cover your cough and sneezes.    Clean your hands often.    Avoid sharing personal household items.    Clean and disinfect frequently touched objects and surfaces everyday.    You will need to have someone drop off medications or household supplies (if needed) at your house without coming inside or in contact with you or others living in your house.    Monitor your symptoms and seek prompt medical care if your illness is worsening (e.g. Difficulty breathing).    Discontinue home isolation only in consultation with your healthcare provider.     For more detailed and up to date information on what to do if you are sick, visit this link: What to Do If You Are Sick With COVID-19.  Do I need to be tested for COVID-19 (Coronavirus)?     Not everyone needs to be tested for COVID-19 (Coronavirus). Decisions on which patients receive testing will be based on the local spread of COVID-19 (Coronavirus) as well as the symptoms. Your  "healthcare provider will make the final decision on whether you should be tested.    In the meantime, if you have concerns that you may have been exposed, it is reasonable to practice \"social distancing.\"&nbsp; If you are ill with a cold or flu-like illness, please monitor your symptoms and call your healthcare provider if your symptoms worsen.    For more up to date information, visit this link: COVID-19 (Coronavirus) Frequently Asked Questions and Answers.      Diagnosis: Acute bacterial sinusitis  Diagnosis ICD: J01.90  Prescription: doxycycline hyclate 100 mg oral tablet 14 tablet, 7 days supply. Take 1 tablet by mouth 2 times per day for 7 days. Refills: 0, Refill as needed: no, Allow substitutions: yes  Pharmacy: [facility]/pharmacy #[id number] - [phone] - [address], [place] [address]  "

## 2020-03-31 ENCOUNTER — VIRTUAL VISIT (OUTPATIENT)
Dept: FAMILY MEDICINE | Facility: OTHER | Age: 35
End: 2020-03-31

## 2020-03-31 NOTE — PROGRESS NOTES
"Date: 2020 15:50:30  Clinician: Filomena Barillas  Clinician NPI: [id number]  Patient: Sherri Lynn  Patient : 1985  Patient Address: [address], [place] [address]  Patient Phone: [phone]  Visit Protocol: URI  Patient Summary:  Sherri is a 34 year old ( : 1985 ) female who initiated a Visit for cold, sinus infection, or influenza. When asked the question \"Please sign me up to receive news, health information and promotions from [employer].\", Sherri responded \"No\".    Sherri states her symptoms started gradually 10-13 days ago. After her symptoms started, they improved and then got worse again.   Her symptoms consist of a headache, facial pain or pressure, myalgia, chills, wheezing, a sore throat, a cough, nasal congestion, malaise, enlarged lymph nodes, and ear pain. Sherri also feels feverish.   Symptom details     Nasal secretions: The color of her mucus is green.    Cough: Sherri coughs every 5-10 minutes and her cough is more bothersome at night. Phlegm does not come into her throat when she coughs. She does not believe her cough is caused by post-nasal drip.     Sore throat: Sherri reports having mild throat pain (1-3 on a 10 point pain scale), does not have exudate on her tonsils, and can swallow liquids. The lymph nodes in her neck are enlarged. A rash has not appeared on the skin since the sore throat started.     Temperature: Her current temperature is 98.2 degrees Fahrenheit.     Wheezing: Sherri has been diagnosed with asthma. The wheezing interferes with her normal daily activities.    Facial pain or pressure: The facial pain or pressure feels worse when bending over or leaning forward.     Headache: She states the headache is moderate (4-6 on a 10 point pain scale).      Sherri denies having rhinitis and teeth pain. She also denies having recent facial or sinus surgery in the past 60 days.   Precipitating events  Within the past week, Sherri has not been exposed " to someone with strep throat. She has not recently been exposed to someone with influenza. Sherri has been in close contact with the following high risk individuals: people with asthma, heart disease or diabetes and immunocompromised people.   Pertinent COVID-19 (Coronavirus) information  Sherri has not traveled internationally or to the areas where COVID-19 (Coronavirus) is widespread, including cruise ship travel in the last 14 days before the start of her symptoms.   Sherri has not had a close contact with a laboratory-confirmed COVID-19 patient within 14 days of symptom onset. She has had a close contact with a suspected COVID-19 patient within 14 days of symptom onset. 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   Triage Point(s) temporarily suspended for COVID-19 (Coronavirus) screening  Sherri reported the following symptoms which were previously protocol referral points. These protocol referral points have temporarily been removed for purposes of COVID-19 (Coronavirus) screening.     Difficulty breathing even when resting and can only speak in phrase(s)    Wheezing that keeps Sherri from doing daily activities     Pertinent medical history  Sherri had 2 sinus infections within the past year.   Sherri has taken an antibiotic medication in the past month. Antibiotic details as reported by the patient (free text): doxycycline day 2 for sinusitis   Sherri does not get yeast infections when she takes antibiotics.   Sherri does not need a return to work/school note.   Weight: 170 lbs   Sherri does not smoke or use smokeless tobacco.   She denies pregnancy and denies breastfeeding. She does not menstruate.   Weight: 170 lbs  A synchronous phone  visit was initiated by the provider for the following reason: reports severe sob and wheezing need to clarify    MEDICATIONS: Plaquenil oral, ALLERGIES: Imuran, Influenza Virus Vaccines, Keflex, Augmentin, Bactrim  Clinician Response:  Dear Sherri,   Based on the information you have provided, you do have symptoms that are consistent with Coronavirus (COVID-19).  The coronavirus causes mild to severe respiratory illness with the most common symptoms including fever, cough and difficulty breathing. Unfortunately, many viruses cause similar symptoms and it can be difficult to distinguish between viruses, especially in mild cases, so we are presuming that anyone with cough or fever has coronavirus at this time.  Coronavirus/COVID-19 has reached the point of community spread in [place], meaning that we are finding the virus in people with no known exposure risk for lance the virus. Given the increasing commonness of coronavirus in the community we are no longer testing patients who are not critically ill.  If you are a health care worker, you should refer to your employee health office for instructions about testing and returning to work.  For everyone else who has cough or fever, you should assume you are infected with coronavirus. Since you will not be tested but have symptoms that may be consistent with coronavirus, the CDC recommends you stay in self-isolation until these three things have happened:    You have had no fever for at least 72 hours (that is three full days of no fever without the use of medicine that reduces fevers)    AND   Other symptoms have improved (for example, when your cough or shortness of breath have improved)   AND   At least 7 days have passed since your symptoms first appeared.   How to Isolate:   Isolate yourself at home.  Do Not allow any visitors  Do Not go to work or school  Do Not go to [employer],  centers, shopping, or other public places.  Do Not shake hands.  Avoid  close contact with others (hugging, kissing).   Protect Others:   Cover Your Mouth and Nose with a mask, disposable tissue or wash cloth to avoid spreading germs to others.  Wash your hands and face frequently with soap and water.   We know it can be scary to hear that you might have COVID-19. Our team can help track your symptoms and make sure you are doing ok over the next two weeks using a program called [employer] to keep in touch. When you receive an email from [employer], please consider enrolling in our monitoring program. There is no cost to you for monitoring. Here is a URL where you can learn more: [email or web address]  Managing Symptoms:   At this time, we primarily recommend Tylenol (Acetaminophen) for fever or pain. If you have liver or kidney problems, contact your primary care provider for instructions on use of tylenol. Adults can take 650 mg (two 325 mg pills) by mouth every 4-6 hours as needed OR 1,000 mg (two 500 mg pills) every 8 hours as needed. MAXIMUM DAILY DOSE: 3,000mg. For children, refer to dosing on bottle based on age or weight.   If you develop significant shortness of breath that prevents you from doing normal activities, please call 911 or proceed to the nearest emergency room and alert them immediately that you have been in self-isolation for possible coronavirus.  If you have a higher risk medical condition such as cancer, heart failure, end stage renal disease on dialysis or have a transplant, please reach out to your specialist's clinic to advise them of your [facility] visit should you not improve within the next two days.   For more information about COVID19 and options for caring for yourself at home, please visit the CDC website at https://www.cdc.gov/coronavirus/2019-ncov/about/steps-when-sick.htmlFor more options for care at [facility], please visit our website at [email or web address]    Diagnosis: Coronavirus infection,  unspecified  Diagnosis ICD: B34.2  Triage Notes: I reviewed the patient's history, verified their identity, and explained the Visit process.    We reviewed Sherri's medical history and medication list as well as what she has been trying at home. we discussed the course of her treatment and symptom progress. She did not sleep last night due to cough and is requesting cough medicine and something to help her sleep. we did discuss she can try a 1/2 tab of trazodone and if that doesn't work could take the remaining 1/2 or try the full tab the next night. she was previously referred to [facility] so does not need another referral.  Synchronous Triage: phone, status: completed, duration: 544 seconds  Prescription: benzonatate (Tessalon Perles) 100 mg oral capsule 21 capsule, 7 days supply. Take 1 capsule by mouth 3 times per day for 7 days as needed. Refills: 0, Refill as needed: no, Allow substitutions: yes  Prescription: trazodone 50 mg oral tablet 30 tablet, 30 days supply. Take 1 tablet by mouth 1 time per day for 14 days. Refills: 0, Refill as needed: no, Allow substitutions: yes  Pharmacy: [facility]/pharmacy #[id number] - [phone] - [address], [place] [address]

## 2020-04-14 ENCOUNTER — OFFICE VISIT - HEALTHEAST (OUTPATIENT)
Dept: FAMILY MEDICINE | Facility: CLINIC | Age: 35
End: 2020-04-14

## 2020-04-14 ENCOUNTER — COMMUNICATION - HEALTHEAST (OUTPATIENT)
Dept: FAMILY MEDICINE | Facility: CLINIC | Age: 35
End: 2020-04-14

## 2020-04-14 DIAGNOSIS — R41.840 CONCENTRATION DEFICIT: ICD-10-CM

## 2020-04-14 DIAGNOSIS — K50.00 CROHN'S DISEASE OF SMALL INTESTINE WITHOUT COMPLICATION (H): ICD-10-CM

## 2020-04-14 DIAGNOSIS — J45.41 MODERATE PERSISTENT ASTHMA WITH EXACERBATION: ICD-10-CM

## 2020-04-14 DIAGNOSIS — M25.50 MULTIPLE JOINT PAIN: ICD-10-CM

## 2020-04-14 ASSESSMENT — ANXIETY QUESTIONNAIRES
3. WORRYING TOO MUCH ABOUT DIFFERENT THINGS: MORE THAN HALF THE DAYS
4. TROUBLE RELAXING: NOT AT ALL
GAD7 TOTAL SCORE: 7
2. NOT BEING ABLE TO STOP OR CONTROL WORRYING: NOT AT ALL
IF YOU CHECKED OFF ANY PROBLEMS ON THIS QUESTIONNAIRE, HOW DIFFICULT HAVE THESE PROBLEMS MADE IT FOR YOU TO DO YOUR WORK, TAKE CARE OF THINGS AT HOME, OR GET ALONG WITH OTHER PEOPLE: VERY DIFFICULT
6. BECOMING EASILY ANNOYED OR IRRITABLE: MORE THAN HALF THE DAYS
7. FEELING AFRAID AS IF SOMETHING AWFUL MIGHT HAPPEN: SEVERAL DAYS
1. FEELING NERVOUS, ANXIOUS, OR ON EDGE: MORE THAN HALF THE DAYS
5. BEING SO RESTLESS THAT IT IS HARD TO SIT STILL: NOT AT ALL

## 2020-04-14 ASSESSMENT — PATIENT HEALTH QUESTIONNAIRE - PHQ9: SUM OF ALL RESPONSES TO PHQ QUESTIONS 1-9: 10

## 2020-04-14 ASSESSMENT — MIFFLIN-ST. JEOR: SCORE: 1463.95

## 2020-04-21 ENCOUNTER — RECORDS - HEALTHEAST (OUTPATIENT)
Dept: ADMINISTRATIVE | Facility: OTHER | Age: 35
End: 2020-04-21

## 2020-04-22 ENCOUNTER — COMMUNICATION - HEALTHEAST (OUTPATIENT)
Dept: GERIATRICS | Facility: CLINIC | Age: 35
End: 2020-04-22

## 2020-04-22 DIAGNOSIS — B34.2 CORONAVIRUS INFECTION, UNSPECIFIED: ICD-10-CM

## 2020-04-23 LAB
ALT SERPL W/O P-5'-P-CCNC: 98 IU/L (ref 5–35)
AST SERPL-CCNC: 43 U/L (ref 5–34)
CREAT SERPL-MCNC: 0.72 MG/DL (ref 0.5–1.3)

## 2020-05-06 ENCOUNTER — COMMUNICATION - HEALTHEAST (OUTPATIENT)
Dept: FAMILY MEDICINE | Facility: CLINIC | Age: 35
End: 2020-05-06

## 2020-05-06 DIAGNOSIS — J45.41 MODERATE PERSISTENT ASTHMA WITH EXACERBATION: ICD-10-CM

## 2020-05-19 ENCOUNTER — RECORDS - HEALTHEAST (OUTPATIENT)
Dept: ADMINISTRATIVE | Facility: OTHER | Age: 35
End: 2020-05-19

## 2020-05-26 ENCOUNTER — RECORDS - HEALTHEAST (OUTPATIENT)
Dept: HEALTH INFORMATION MANAGEMENT | Facility: CLINIC | Age: 35
End: 2020-05-26

## 2020-06-25 ENCOUNTER — HOME CARE/HOSPICE - HEALTHEAST (OUTPATIENT)
Dept: HOSPICE | Facility: HOSPICE | Age: 35
End: 2020-06-25

## 2020-07-21 ENCOUNTER — COMMUNICATION - HEALTHEAST (OUTPATIENT)
Dept: FAMILY MEDICINE | Facility: CLINIC | Age: 35
End: 2020-07-21

## 2020-07-21 ENCOUNTER — OFFICE VISIT - HEALTHEAST (OUTPATIENT)
Dept: FAMILY MEDICINE | Facility: CLINIC | Age: 35
End: 2020-07-21

## 2020-07-21 ENCOUNTER — OFFICE VISIT (OUTPATIENT)
Dept: URGENT CARE | Facility: URGENT CARE | Age: 35
End: 2020-07-21
Payer: COMMERCIAL

## 2020-07-21 ENCOUNTER — NURSE TRIAGE (OUTPATIENT)
Dept: NURSING | Facility: CLINIC | Age: 35
End: 2020-07-21

## 2020-07-21 VITALS
DIASTOLIC BLOOD PRESSURE: 68 MMHG | TEMPERATURE: 97.9 F | OXYGEN SATURATION: 98 % | HEART RATE: 94 BPM | SYSTOLIC BLOOD PRESSURE: 118 MMHG

## 2020-07-21 DIAGNOSIS — L02.411 ABSCESS OF AXILLA, RIGHT: Primary | ICD-10-CM

## 2020-07-21 PROCEDURE — 99214 OFFICE O/P EST MOD 30 MIN: CPT | Performed by: PREVENTIVE MEDICINE

## 2020-07-21 RX ORDER — DOXYCYCLINE 100 MG/1
100 CAPSULE ORAL 2 TIMES DAILY
Qty: 20 CAPSULE | Refills: 0 | Status: SHIPPED | OUTPATIENT
Start: 2020-07-21 | End: 2020-08-07

## 2020-07-21 RX ORDER — ETONOGESTREL 68 MG/1
1 IMPLANT SUBCUTANEOUS
COMMUNITY

## 2020-07-21 NOTE — PROGRESS NOTES
SUBJECTIVE:  Sherri Lynn, a 34 year old female scheduled an appointment to discuss the following issues:  This is a 35 yo female with r axilla abscess.  It has been present for 10 days.  Opened 4 days ago and has been draining.  Is painful.  No fever or chills.  First time she has had an abscess in this region.    Medical, social, surgical, and family histories reviewed.    PMH - Crohn's Disease, Inflammatory Arthritis  SH - no smoking, no alcohol, no drugs  FH - non contributory    ROS:  CONSTITUTIONAL: NEGATIVE for fever, chills  EYES: NEGATIVE for vision changes   RESP: NEGATIVE for significant cough or SOB  CV: NEGATIVE for chest pain, palpitations   GI: NEGATIVE for nausea, abdominal pain, heartburn, or change in bowel habits  : NEGATIVE for frequency, dysuria, or hematuria  MUSCULOSKELETAL: NEGATIVE for significant arthralgias or myalgia  NEURO: NEGATIVE for weakness, dizziness or paresthesias or headache    OBJECTIVE:  /68   Pulse 94   Temp 97.9  F (36.6  C) (Tympanic)   SpO2 98%   EXAM:  GENERAL APPEARANCE: healthy, alert and no distress  EYES: EOMI,  PERRL  HENT: ear canals and TM's normal and nose and mouth without ulcers or lesions  RESP: lungs clear to auscultation - no rales, rhonchi or wheezes  CV: regular rates and rhythm, normal S1 S2, no S3 or S4 and no murmur, click or rub -  ABDOMEN:  soft, nontender, no HSM or masses and bowel sounds normal    ASSESSMENT/PLAN:  R Axilla abscess - doxycycline for 10 days, continue hot compresses to promote drainage.  Follow up with pcp in one  Week.  ?start of hidradenitis supportiva

## 2020-07-22 NOTE — TELEPHONE ENCOUNTER
Seen at [facility] - doxycycline prescription was supposed to be sent to [facility] in [place] on [address] - called into  - prescription wasn't sent, provider will send now.    COVID 19 Nurse Triage Plan/Patient Instructions    Please be aware that novel coronavirus (COVID-19) may be circulating in the community. If you develop symptoms such as fever, cough, or SOB or if you have concerns about the presence of another infection including coronavirus (COVID-19), please contact your health care provider or visit [email or web address].     Disposition/Instructions    Home care recommended. Follow home care protocol based instructions.    Thank you for taking steps to prevent the spread of this virus.  o Limit your contact with others.  o Wear a simple mask to cover your cough.  o Wash your hands well and often.    Resources    [facility]: About COVID-19: [email or web address]    CDC: What to Do If You're Sick: www.cdc.gov/coronavirus/2019-ncov/about/steps-when-sick.html    CDC: Ending Home Isolation: www.cdc.gov/coronavirus/2019-ncov/hcp/disposition-in-home-patients.html     CDC: Caring for Someone: www.cdc.gov/coronavirus/2019-ncov/if-you-are-sick/care-for-someone.html     [facility]: Interim Guidance for Hospital Discharge to Home: www.health.[facility].[place].us/diseases/coronavirus/hcp/hospdischarge.pdf    [facility] clinical trials (COVID-19 research studies): clinicalaffairs.[facility]-clinical-trials     Below are the COVID-19 hotlines at the [facility] of Health ([facility]). Interpreters are available.   o For health questions: Call 460-037-4302 or 1-898.704.1795 (7 a.m. to 7 p.m.)  o For questions about schools and childcare: Call 692-033-8286 or 1-348.492.2258 (7 a.m. to 7 p.m.)     Nava Julien RN on 7/21/2020 at 7:16 PM     Reason for Disposition    [1] Prescription not at pharmacy AND [2] was prescribed today by PCP    Additional Information    Negative: Drug overdose and nurse unable to answer  "question    Negative: Caller requesting information not related to medicine    Negative: Caller requesting a prescription for Strep throat and has a positive culture result    Negative: Rash while taking a medication or within 3 days of stopping it    Negative: Immunization reaction suspected    Negative: [1] Asthma AND [2] having symptoms of asthma (cough, wheezing, etc)    Negative: Took another person's prescription drug    Negative: MORE THAN A DOUBLE DOSE of a prescription or over-the-counter (OTC) drug    Negative: [1] DOUBLE DOSE (an extra dose or lesser amount) of over-the-counter (OTC) drug AND [2] any symptoms (e.g., dizziness, nausea, pain, sleepiness)    Negative: [1] DOUBLE DOSE (an extra dose or lesser amount) of prescription drug AND [2] any symptoms (e.g., dizziness, nausea, pain, sleepiness)    Negative: [1] DOUBLE DOSE (an extra dose or lesser amount) of prescription drug AND [2] NO symptoms (Exception: a double dose of antibiotics)    Negative: Diabetes drug error or overdose (e.g., insulin or extra dose)    Negative: [1] Request for URGENT new prescription or refill of \"essential\" medication (i.e., likelihood of harm to patient if not taken) AND [2] triager unable to fill per unit policy    Protocols used: MEDICATION QUESTION CALL-A-      "

## 2020-07-22 NOTE — PROGRESS NOTES
Called this evening - prescription was not sent by provider that saw patient. Notes 10d treatment. Prescription sent.     1. Abscess of axilla, right  - doxycycline hyclate (VIBRAMYCIN) 100 MG capsule; Take 1 capsule (100 mg) by mouth 2 times daily for 10 days  Dispense: 20 capsule; Refill: 0    JVM

## 2020-07-27 ENCOUNTER — TELEPHONE (OUTPATIENT)
Dept: OPHTHALMOLOGY | Facility: CLINIC | Age: 35
End: 2020-07-27

## 2020-07-27 NOTE — TELEPHONE ENCOUNTER
Left message about upcoming VF as it is not working.  She called right back, will put her on the 7th at 8:15 am for now and understands that if it is not fixed we may have to reschedule again.

## 2020-08-07 ENCOUNTER — OFFICE VISIT (OUTPATIENT)
Dept: OPHTHALMOLOGY | Facility: CLINIC | Age: 35
End: 2020-08-07
Payer: COMMERCIAL

## 2020-08-07 DIAGNOSIS — K50.919 CROHN'S DISEASE WITH COMPLICATION, UNSPECIFIED GASTROINTESTINAL TRACT LOCATION (H): ICD-10-CM

## 2020-08-07 DIAGNOSIS — Z79.899 HIGH RISK MEDICATION USE: Primary | ICD-10-CM

## 2020-08-07 PROCEDURE — 92082 INTERMEDIATE VISUAL FIELD XM: CPT | Performed by: STUDENT IN AN ORGANIZED HEALTH CARE EDUCATION/TRAINING PROGRAM

## 2020-08-07 PROCEDURE — 92002 INTRM OPH EXAM NEW PATIENT: CPT | Performed by: STUDENT IN AN ORGANIZED HEALTH CARE EDUCATION/TRAINING PROGRAM

## 2020-08-07 PROCEDURE — 92134 CPTRZ OPH DX IMG PST SGM RTA: CPT | Performed by: STUDENT IN AN ORGANIZED HEALTH CARE EDUCATION/TRAINING PROGRAM

## 2020-08-07 ASSESSMENT — VISUAL ACUITY
OD_CC: 20/20
OS_CC: 20/20
CORRECTION_TYPE: GLASSES
METHOD: SNELLEN - LINEAR

## 2020-08-07 ASSESSMENT — TONOMETRY
OD_IOP_MMHG: 19
OS_IOP_MMHG: 19
IOP_METHOD: ICARE

## 2020-08-07 ASSESSMENT — SLIT LAMP EXAM - LIDS
COMMENTS: NORMAL
COMMENTS: NORMAL

## 2020-08-07 ASSESSMENT — EXTERNAL EXAM - RIGHT EYE: OD_EXAM: NORMAL

## 2020-08-07 ASSESSMENT — EXTERNAL EXAM - LEFT EYE: OS_EXAM: NORMAL

## 2020-08-07 NOTE — PATIENT INSTRUCTIONS
Normal baseline Plaquenil eye tests today.    Plan to repeat in 1 year    James Leigh MD  [phone]

## 2020-08-07 NOTE — PROGRESS NOTES
Current Eye Medications:  no     Subjective:  Plaquenil eye exam with oct and  hvf  Pt reports that she sees well and reports no other eye problems.    No previous eye injuries or surgeries.   Has been on Plaquenil for about a year now. Sees Dr. Ordoñez at [facility] [place].     Objective:  See Ophthalmology Exam.       Assessment:  Sherri Lynn is a 34 year old female who presents with:   Encounter Diagnoses   Name Primary?     High risk medication use Plaquenil x 1 year.     Macular SD-OCT within normal limits both eyes.    Wood visual field (HVF) 10-2 within normal limits both eyes.    No signs of Plaquenil retinal toxicity at present.        Crohn's disease with complication, unspecified gastrointestinal tract location (H)        Plan:  Normal baseline Plaquenil eye tests today.    Plan to repeat in 1 year    James Leigh MD  [phone]

## 2020-08-07 NOTE — LETTER
8/7/2020       RE: Sherri Lynn  [address]  [place] [address]      Dear Dr. Ordoñez,    Thank you for referring your patient, Sherri Lynn, to the [facility].     Her Plaquenil eye tests were normal today. Plan to repeat tests in 1 year.  Please see a copy of my visit note below.     Current Eye Medications:  no     Subjective:  Plaquenil eye exam with oct and  hvf  Pt reports that she sees well and reports no other eye problems.    No previous eye injuries or surgeries.   Has been on Plaquenil for about a year now. Sees Dr. Ordoñez at [facility] in [place].     Objective:  See Ophthalmology Exam.       Assessment:  Sherri Lynn is a 34 year old female who presents with:   Encounter Diagnoses   Name Primary?     High risk medication use Plaquenil x 1 year.     Macular SD-OCT within normal limits both eyes.    Wood visual field (HVF) 10-2 within normal limits both eyes.    No signs of Plaquenil retinal toxicity at present.        Crohn's disease with complication, unspecified gastrointestinal tract location (H)        Plan:  Normal baseline Plaquenil eye tests today.    Plan to repeat in 1 year    James Leigh MD  [phone]       Again, thank you for allowing me to participate in the care of your patient.        Sincerely,        James Leigh MD

## 2020-10-05 ENCOUNTER — VIRTUAL VISIT (OUTPATIENT)
Dept: FAMILY MEDICINE | Facility: OTHER | Age: 35
End: 2020-10-05
Payer: COMMERCIAL

## 2020-10-05 PROCEDURE — 99421 OL DIG E/M SVC 5-10 MIN: CPT | Performed by: PHYSICIAN ASSISTANT

## 2020-10-05 PROCEDURE — 99421 OL DIG E/M SVC 5-10 MIN: CPT | Performed by: FAMILY MEDICINE

## 2020-10-06 NOTE — PROGRESS NOTES
"Date: 10/05/2020 19:17:41  Clinician: Hao De Dios  Clinician NPI: [id number]  Patient: Sherri Lynn  Patient : 1985  Patient Address: [address], [place] [address]  Patient Phone: [phone]  Visit Protocol: URI  Patient Summary:  Sherri is a 34 year old ( : 1985 ) female who initiated a [facility] Visit for cold, sinus infection, or influenza. When asked the question \"Please sign me up to receive news, health information and promotions from [facility].\", Sherri responded \"No\".    Sherri states her symptoms started gradually 10-13 days ago.   Her symptoms consist of wheezing, a cough, nasal congestion, rhinitis, facial pain or pressure, myalgia, malaise, and a sore throat. She is experiencing mild difficulty breathing with activities but can speak normally in full sentences.   Symptom details     Nasal secretions: The color of her mucus is white and clear.    Cough: Sherri coughs a few times an hour and her cough is not more bothersome at night. Phlegm comes into her throat when she coughs. She does not believe her cough is caused by post-nasal drip. The color of the phlegm is clear and white.     Sore throat: Sherri reports having mild throat pain (1-3 on a 10 point pain scale), does not have exudate on her tonsils, and can swallow liquids. The lymph nodes in her neck are not enlarged. A rash has not appeared on the skin since the sore throat started.     Wheezing: Sherri has been diagnosed with asthma. Additional wheezing details as reported by the patient (free text): asthma excerbation/viral bronchitis       Facial pain or pressure: The facial pain or pressure does not feel worse when bending or leaning forward.      Sherri denies having ear pain, headache, fever, enlarged lymph nodes, anosmia, vomiting, nausea, chills, teeth pain, ageusia, and diarrhea. She also denies double sickening (worsening symptoms after initial improvement), taking antibiotic medication in the past " month, and having recent facial or sinus surgery in the past 60 days.   Precipitating events  Within the past week, Sherri has not been exposed to someone with strep throat. She has not recently been exposed to someone with influenza. Sherri has been in close contact with the following high risk individuals: adults 65 or older, people with asthma, heart disease or diabetes, and immunocompromised people.   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   She either works or volunteers as a healthcare worker or a , or works or volunteers in a healthcare facility. 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 She lives with a healthcare worker.   Sherri has not had a close contact with a laboratory-confirmed COVID-19 patient within 14 days of symptom onset.   Since December 2019, Sherri and has had upper respiratory infection (URI) or influenza-like illness. Has not been diagnosed with lab-confirmed COVID-19 test      Date(s) of previous URI or influenza-like illness (free-text): March 2020     Symptoms Sherri experienced during previous URI or influenza-like illness as reported by the patient (free-text): cough, wheezing, dyspnea, sinus congestion        Pertinent medical history  Sherri does not get yeast infections when she takes antibiotics.   Sherri needs a return to work/school note.   Weight: 180 lbs   Sherri does not smoke or use smokeless tobacco.   She denies pregnancy and denies breastfeeding. She does not menstruate.   Additional information as reported by the patient (free text): This is a viral bronchitis/asthma exacerbation that I get every year in the fall.  I have been negative for COVID three times in the past week with testing at work.  Please give prednisone burst as this works well.   Thank you.   Weight: 180 lbs  Reason for repeat visit for the same protocol within 24 hours:  I have been tested for COVID-19 three times in the past week negative.  I have viral bronchitis.  Please give a prednisone burst.  Thank you.  See the History of referred by protocol and completed visits section for details on previous visits (visits currently in queue to be diagnosed will not appear in this section).    MEDICATIONS: Benadryl Allergy oral, Claritin oral, boswellia jossy extract (bulk), turmeric-turmeric root extract oral, Sudafed oral, Guaifenesin AC oral, fluticasone propionate inhalation, albuterol sulfate inhalation, Plaquenil oral, ALLERGIES: Imuran, Influenza Virus Vaccines, Keflex, Augmentin, Bactrim  Clinician Response:  Dear Sherri,   I have sent you a prednisone burst for asthma exacerbation.    Diagnosis: Mild persistent asthma with (acute) exacerbation  Diagnosis ICD: J45.31  Prescription: prednisone 20 mg oral tablet 10 tablet, 5 days supply. Take 1 tablet by mouth 2 times per day for 5 days. Refills: 0, Refill as needed: no, Allow substitutions: yes  Pharmacy: [facility]/pharmacy #[id number] - [phone] - [address],  [place] [address]

## 2020-10-06 NOTE — PROGRESS NOTES
"Date: 10/05/2020 19:07:09  Clinician: Olvin Hunter  Clinician NPI: [id number]  Patient: Sherri Lynn  Patient : 1985  Patient Address: [address], [place] [address]  Patient Phone: [phone]  Visit Protocol: URI  Patient Summary:  Sherri is a 34 year old ( : 1985 ) female who initiated a [facility] Visit for cold, sinus infection, or influenza. When asked the question \"Please sign me up to receive news, health information and promotions from [facility].\", Sherri responded \"No\".    Sherri states her symptoms started gradually 10-13 days ago.   Her symptoms consist of a headache, wheezing, a cough, nasal congestion, rhinitis, facial pain or pressure, myalgia, chills, malaise, and a sore throat. She is experiencing mild difficulty breathing with activities but can speak normally in full sentences.   Symptom details     Nasal secretions: The color of her mucus is clear.    Cough: Sherri coughs a few times an hour and her cough is not more bothersome at night. Phlegm comes into her throat when she coughs. She does not believe her cough is caused by post-nasal drip. The color of the phlegm is clear and white.     Sore throat: Sherri reports having mild throat pain (1-3 on a 10 point pain scale), does not have exudate on her tonsils, and can swallow liquids. The lymph nodes in her neck are not enlarged. A rash has not appeared on the skin since the sore throat started.     Wheezing: Sherri has been diagnosed with asthma. Additional wheezing details as reported by the patient (free text): asthma exacerbation/viral bronchitis       Facial pain or pressure: The facial pain or pressure does not feel worse when bending or leaning forward.     Headache: She states the headache is mild (1-3 on a 10 point pain scale).      Sherri denies having ear pain, fever, enlarged lymph nodes, anosmia, vomiting, nausea, teeth pain, ageusia, and diarrhea. She also denies double sickening (worsening symptoms " after initial improvement), taking antibiotic medication in the past month, and having recent facial or sinus surgery in the past 60 days.   Precipitating events  Sherri is not sure if she has been exposed to someone with strep throat. She has not recently been exposed to someone with influenza. Sherri has been in close contact with the following high risk individuals: adults 65 or older, people with asthma, heart disease or diabetes, and immunocompromised people.   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   She either works or volunteers as a healthcare worker or a , or works or volunteers in a healthcare facility. 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 She lives with a healthcare worker.   Sherri has had a close contact with a laboratory-confirmed COVID-19 patient within 14 days of symptom onset. She was exposed at her work. Additional information about contact with COVID-19 (Coronavirus) patient as reported by the patient (free text): wearing full PPE; negative three times in the past week COVID rapid and PCR swabs   Since December 2019, Sherri and has had upper respiratory infection (URI) or influenza-like illness. Has not been diagnosed with lab-confirmed COVID-19 test      Date(s) of previous URI or influenza-like illness (free-text): March 2020     Symptoms Sherri experienced during previous URI or influenza-like illness as reported by the patient (free-text): cough, wheezing, sinus congestion, sore throat        Pertinent medical history  Sherri does not get yeast infections when she takes antibiotics.   Sherri needs a return to work/school note.   Weight: 180 lbs   Sherri does not smoke or use smokeless tobacco.   She denies pregnancy and denies breastfeeding. She  does not menstruate.   Weight: 180 lbs    MEDICATIONS: Benadryl Allergy oral, Claritin oral, boswellia jossy extract (bulk), turmeric-turmeric root extract oral, Sudafed oral, Guaifenesin AC oral, fluticasone propionate inhalation, albuterol sulfate inhalation, Plaquenil oral, ALLERGIES: Imuran, Influenza Virus Vaccines, Keflex, Augmentin, Bactrim  Clinician Response:  Dear Sherri,   Your symptoms show that you may have coronavirus (COVID-19). This illness can cause fever, cough and trouble breathing. Many people get a mild case and get better on their own. Some people can get very sick.  What should I do?  We would like to test you for this virus.   1. Please call [phone] to schedule your visit. Explain that you were referred by [facility] to have a COVID-19 test. Be ready to share your [facility] visit ID number.  The following will serve as your written order for this COVID Test, ordered by me, for the indication of suspected COVID [Z20.828]: The test will be ordered in [employer], our electronic health record, after you are scheduled. It will show as ordered and authorized by Jeferson Geronimo MD.  Order: COVID-19 (Coronavirus) PCR for SYMPTOMATIC testing from [facility].      2. When it's time for your COVID test:  Stay at least 6 feet away from others. (If someone will drive you to your test, stay in the backseat, as far away from the  as you can.)   Cover your mouth and nose with a mask, tissue or washcloth.  Go straight to the testing site. Don't make any stops on the way there or back.      3.Starting now: Stay home and away from others (self-isolate) until:   You've had no fever---and no medicine that reduces fever---for one full day (24 hours). And...   Your other symptoms have gotten better. For example, your cough or breathing has improved. And...   At least 10 days have passed since your symptoms started.       During this time, don't leave the house except for testing or medical care.   Stay in your own room,  "even for meals. Use your own bathroom if you can.   Stay away from others in your home. No hugging, kissing or shaking hands. No visitors.  Don't go to work, school or anywhere else.    Clean \"high touch\" surfaces often (doorknobs, counters, handles, etc.). Use a household cleaning spray or wipes. You'll find a full list of  on the EPA website: www.epa.gov/pesticide-registration/list-n-disinfectants-use-against-sars-cov-2.   Cover your mouth and nose with a mask, tissue or washcloth to avoid spreading germs.  Wash your hands and face often. Use soap and water.  Caregivers in these groups are at risk for severe illness due to COVID-19:  o People 65 years and older  o People who live in a nursing home or long-term care facility  o People with chronic disease (lung, heart, cancer, diabetes, kidney, liver, immunologic)  o People who have a weakened immune system, including those who:   Are in cancer treatment  Take medicine that weakens the immune system, such as corticosteroids  Had a bone marrow or organ transplant  Have an immune deficiency  Have poorly controlled HIV or AIDS  Are obese (body mass index of 40 or higher)  Smoke regularly   o Caregivers should wear gloves while washing dishes, handling laundry and cleaning bedrooms and bathrooms.  o Use caution when washing and drying laundry: Don't shake dirty laundry, and use the warmest water setting that you can.  o For more tips, go to www.cdc.gov/coronavirus/2019-ncov/downloads/10Things.pdf.    4.Sign up for [employer]. We know it's scary to hear that you might have COVID-19. We want to track your symptoms to make sure you're okay over the next 2 weeks. Please look for an email from [employer]---this is a free, online program that we'll use to keep in touch. To sign up, follow the link in the email. Learn more at [email or web address]  How can I take care of myself?   Get lots of rest. Drink extra fluids (unless a doctor has told you not " to).   Take Tylenol (acetaminophen) for fever or pain. If you have liver or kidney problems, ask your family doctor if it's okay to take Tylenol.   Adults can take either:    650 mg (two 325 mg pills) every 4 to 6 hours, or...   1,000 mg (two 500 mg pills) every 8 hours as needed.    Note: Don't take more than 3,000 mg in one day. Acetaminophen is found in many medicines (both prescribed and over-the-counter medicines). Read all labels to be sure you don't take too much.   For children, check the Tylenol bottle for the right dose. The dose is based on the child's age or weight.    If you have other health problems (like cancer, heart failure, an organ transplant or severe kidney disease): Call your specialty clinic if you don't feel better in the next 2 days.       Know when to call 911. Emergency warning signs include:    Trouble breathing or shortness of breath Pain or pressure in the chest that doesn't go away Feeling confused like you haven't felt before, or not being able to wake up Bluish-colored lips or face.  Where can I get more information?   [facility] -- About COVID-19: [email or web address]   CDC -- What to Do If You're Sick: www.cdc.gov/coronavirus/2019-ncov/about/steps-when-sick.html   CDC -- Ending Home Isolation: www.cdc.gov/coronavirus/2019-ncov/hcp/disposition-in-home-patients.html   CDC -- Caring for Someone: www.cdc.gov/coronavirus/2019-ncov/if-you-are-sick/care-for-someone.html   [facility] -- Interim Guidance for Hospital Discharge to Home: www.health.[facility].mn.us/diseases/coronavirus/hcp/hospdischarge.pdf   [facility] clinical trials (COVID-19 research studies): clinicalaffairs.[facility]/umn-clinical-trials    Below are the COVID-19 hotlines at the [place] Department of [facility] ([facility]). Interpreters are available.    For health questions: Call 484-841-9384 or 1-523.598.8870 (7 a.m. to 7 p.m.) For questions about schools and childcare: Call 023-906-1050 or 1-817.135.8612 (7  a.m. to 7 p.m.)    Diagnosis: Acute upper respiratory infection, unspecified  Diagnosis ICD: J06.9  Prescription: doxycycline monohydrate (Monodox) 100 mg oral capsule 20 capsule, 10 days supply. Take 1 capsule by mouth 2 times per day for 10 days. Refills: 0, Refill as needed: no, Allow substitutions: yes  Pharmacy: [facility]/pharmacy #[id number] - [phone] - [address],  [place] [address]

## 2020-10-08 ENCOUNTER — OFFICE VISIT - HEALTHEAST (OUTPATIENT)
Dept: FAMILY MEDICINE | Facility: CLINIC | Age: 35
End: 2020-10-08

## 2020-10-08 DIAGNOSIS — J45.41 MODERATE PERSISTENT ASTHMA WITH EXACERBATION: ICD-10-CM

## 2020-10-08 DIAGNOSIS — K50.00 CROHN'S DISEASE OF SMALL INTESTINE WITHOUT COMPLICATION (H): ICD-10-CM

## 2020-10-08 ASSESSMENT — ANXIETY QUESTIONNAIRES
4. TROUBLE RELAXING: NOT AT ALL
7. FEELING AFRAID AS IF SOMETHING AWFUL MIGHT HAPPEN: SEVERAL DAYS
1. FEELING NERVOUS, ANXIOUS, OR ON EDGE: SEVERAL DAYS
IF YOU CHECKED OFF ANY PROBLEMS ON THIS QUESTIONNAIRE, HOW DIFFICULT HAVE THESE PROBLEMS MADE IT FOR YOU TO DO YOUR WORK, TAKE CARE OF THINGS AT HOME, OR GET ALONG WITH OTHER PEOPLE: SOMEWHAT DIFFICULT
GAD7 TOTAL SCORE: 4
3. WORRYING TOO MUCH ABOUT DIFFERENT THINGS: SEVERAL DAYS
6. BECOMING EASILY ANNOYED OR IRRITABLE: SEVERAL DAYS
5. BEING SO RESTLESS THAT IT IS HARD TO SIT STILL: NOT AT ALL
2. NOT BEING ABLE TO STOP OR CONTROL WORRYING: NOT AT ALL

## 2020-10-08 ASSESSMENT — PATIENT HEALTH QUESTIONNAIRE - PHQ9: SUM OF ALL RESPONSES TO PHQ QUESTIONS 1-9: 12

## 2020-10-10 ENCOUNTER — VIRTUAL VISIT (OUTPATIENT)
Dept: FAMILY MEDICINE | Facility: OTHER | Age: 35
End: 2020-10-10
Payer: COMMERCIAL

## 2020-10-10 PROCEDURE — 99421 OL DIG E/M SVC 5-10 MIN: CPT | Performed by: FAMILY MEDICINE

## 2020-10-11 ENCOUNTER — OFFICE VISIT (OUTPATIENT)
Dept: URGENT CARE | Facility: URGENT CARE | Age: 35
End: 2020-10-11
Payer: COMMERCIAL

## 2020-10-11 VITALS
OXYGEN SATURATION: 97 % | BODY MASS INDEX: 31.89 KG/M2 | DIASTOLIC BLOOD PRESSURE: 72 MMHG | HEART RATE: 91 BPM | TEMPERATURE: 97.9 F | WEIGHT: 180 LBS | SYSTOLIC BLOOD PRESSURE: 118 MMHG

## 2020-10-11 DIAGNOSIS — R05.9 COUGH: ICD-10-CM

## 2020-10-11 DIAGNOSIS — R06.02 SHORTNESS OF BREATH: Primary | ICD-10-CM

## 2020-10-11 DIAGNOSIS — R07.81 RIB PAIN ON RIGHT SIDE: ICD-10-CM

## 2020-10-11 DIAGNOSIS — J45.41 MODERATE PERSISTENT ASTHMA WITH ACUTE EXACERBATION: ICD-10-CM

## 2020-10-11 PROCEDURE — U0003 INFECTIOUS AGENT DETECTION BY NUCLEIC ACID (DNA OR RNA); SEVERE ACUTE RESPIRATORY SYNDROME CORONAVIRUS 2 (SARS-COV-2) (CORONAVIRUS DISEASE [COVID-19]), AMPLIFIED PROBE TECHNIQUE, MAKING USE OF HIGH THROUGHPUT TECHNOLOGIES AS DESCRIBED BY CMS-2020-01-R: HCPCS | Performed by: FAMILY MEDICINE

## 2020-10-11 PROCEDURE — 99214 OFFICE O/P EST MOD 30 MIN: CPT | Performed by: FAMILY MEDICINE

## 2020-10-11 RX ORDER — LIDOCAINE 50 MG/G
1 PATCH TOPICAL EVERY 24 HOURS
Qty: 15 PATCH | Refills: 1 | Status: SHIPPED | OUTPATIENT
Start: 2020-10-11 | End: 2021-11-18

## 2020-10-11 RX ORDER — DEXAMETHASONE 6 MG/1
6 TABLET ORAL DAILY
Qty: 7 TABLET | Refills: 0 | Status: SHIPPED | OUTPATIENT
Start: 2020-10-11 | End: 2021-11-18

## 2020-10-11 RX ORDER — AZITHROMYCIN 250 MG/1
250 TABLET, FILM COATED ORAL
COMMUNITY
Start: 2020-10-08 | End: 2020-10-13

## 2020-10-11 RX ORDER — CODEINE PHOSPHATE AND GUAIFENESIN 10; 100 MG/5ML; MG/5ML
1-2 SOLUTION ORAL EVERY 6 HOURS PRN
Qty: 120 ML | Refills: 1 | Status: SHIPPED | OUTPATIENT
Start: 2020-10-11 | End: 2021-11-18

## 2020-10-11 RX ORDER — MONTELUKAST SODIUM 10 MG/1
10 TABLET ORAL
COMMUNITY
Start: 2020-10-08 | End: 2021-11-18

## 2020-10-11 NOTE — PATIENT INSTRUCTIONS
Call [phone] if you don't see the COVID-19 test result on MyChart in 3 days.     Continue the Singulair, the Duonebs, the Albuterol inhalers.      Go to the emergency room if you develop severe, worsening shortness of breath and persistently low oxygen saturation readings on the Pulse Ox monitor.      follow up with your primary care provider if not better in 3-4 days.     Apply ice over the right ribs.

## 2020-10-11 NOTE — PROGRESS NOTES
"Date: 10/10/2020 21:55:47  Clinician: Kathy Sauer  Clinician NPI: [id number]  Patient: Sherri Lynn  Patient : 1985  Patient Address: [address], [place] [address]  Patient Phone: [phone]  Visit Protocol: URI  Patient Summary:  Sherri is a 34 year old ( : 1985 ) female who initiated a [facility] Visit for cold, sinus infection, or influenza. When asked the question \"Please sign me up to receive news, health information and promotions from [facility].\", Sherri responded \"No\".    Sherri states her symptoms started gradually 2-3 weeks ago. After her symptoms started, they improved and then got worse again.   Her symptoms consist of wheezing, a cough, myalgia, chills, malaise, and a sore throat.   Symptom details     Cough: Sherri coughs every 5-10 minutes and her cough is more bothersome at night. Phlegm comes into her throat when she coughs. She does not believe her cough is caused by post-nasal drip. The color of the phlegm is clear and white.     Sore throat: Sherri reports having mild throat pain (1-3 on a 10 point pain scale), does not have exudate on her tonsils, and can swallow liquids. The lymph nodes in her neck are not enlarged. A rash has not appeared on the skin since the sore throat started.     Wheezing: Sherri has been diagnosed with asthma. Additional wheezing details as reported by the patient (free text): wheezing with laying flat and activity        Sherri denies having ear pain, headache, fever, enlarged lymph nodes, nasal congestion, anosmia, vomiting, rhinitis, nausea, facial pain or pressure, teeth pain, ageusia, and diarrhea. She also denies having recent facial or sinus surgery in the past 60 days.   Precipitating events  Within the past week, Sherri has not been exposed to someone with strep throat. She has not recently been exposed to someone with influenza. Sherri has been in close contact with the following high risk individuals: adults 65 or older, " people with asthma, heart disease or diabetes, and immunocompromised people.   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   She either works or volunteers as a healthcare worker or a , or works or volunteers in a healthcare facility. She provides direct patient care. 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 She lives with a healthcare worker.   Sherri has had a close contact with a laboratory-confirmed COVID-19 patient within 14 days of symptom onset. She was exposed at her work. 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 Has not been diagnosed with lab-confirmed COVID-19 test      Date(s) of previous URI or influenza-like illness (free-text): March 2020     Symptoms Sherri experienced during previous URI or influenza-like illness as reported by the patient (free-text): cough, dyspnea, wheezing, chills, myalgia, sore throat        Triage Point(s) temporarily suspended for COVID-19 (Coronavirus) screening  Sherri reported the following symptoms which were previously protocol referral points. These protocol referral points have temporarily been removed for purposes of COVID-19 (Coronavirus) screening.   Difficulty breathing even when resting and can only speak in phrase(s)   Pertinent medical history  Sherri has taken an antibiotic medication in the past month. Antibiotic details as reported by the patient (free text): azithromycin- bronchitis   Sherri does not get yeast infections when she takes antibiotics.   Sherri needs a return to work/school note.   Weight: 180  lbs   Sherri does not smoke or use smokeless tobacco.   She denies pregnancy and denies breastfeeding. She does not menstruate.   Weight: 180 lbs    MEDICATIONS: Tessalon Perles oral, Singulair oral, fluticasone propionate nasal, ipratropium-albuterol inhalation, Benadryl Allergy oral, boswellia jossy extract (bulk), turmeric-turmeric root extract oral, Sudafed oral, Guaifenesin AC oral, fluticasone propionate inhalation, albuterol sulfate inhalation, Plaquenil oral, ALLERGIES: Imuran, Influenza Virus Vaccines, Keflex, Augmentin, Bactrim  Clinician Response:  Dear Sherri,   Your symptoms show that you may have coronavirus (COVID-19). This illness can cause fever, cough and trouble breathing. Many people get a mild case and get better on their own. Some people can get very sick.  Based on the symptoms you have shared, I would like you to be re-checked in 2 to 3 days. Please call your family clinic to set up a video or phone visit.  Will I be tested for COVID-19?  We would like to test you for this virus.   Please call [phone] to schedule your visit. Explain that you were referred by [facility] to have a COVID-19 test. Be ready to share your [facility] visit ID number.   The following will serve as your written order for this COVID Test, ordered by me, for the indication of suspected COVID [Z20.828]: The test will be ordered in [id number], our electronic health record, after you are scheduled. It will show as ordered and authorized by Jeferson Geronimo MD.  Order: COVID-19 (Coronavirus) PCR for SYMPTOMATIC testing from [facility].  1.When it's time for your COVID test:   Stay at least 6 feet away from others. (If someone will drive you to your test, stay in the backseat, as far away from the  as you can.)   Cover your mouth and nose with a mask, tissue or washcloth.  Go straight to the testing site. Don't make any stops on the way there or back.      2.Starting now: Stay home and away from others (self-isolate) until:   You've  "had no fever---and no medicine that reduces fever---for one full day (24 hours). And...   Your other symptoms have gotten better. For example, your cough or breathing has improved. And...   At least 10 days have passed since your symptoms started.       During this time, don't leave the house except for testing or medical care.   Stay in your own room, even for meals. Use your own bathroom if you can.   Stay away from others in your home. No hugging, kissing or shaking hands. No visitors.  Don't go to work, school or anywhere else.    Clean \"high touch\" surfaces often (doorknobs, counters, handles, etc.). Use a household cleaning spray or wipes. You'll find a full list of  on the EPA website: www.epa.gov/pesticide-registration/list-n-disinfectants-use-against-sars-cov-2.   Cover your mouth and nose with a mask, tissue or washcloth to avoid spreading germs.  Wash your hands and face often. Use soap and water.  Caregivers in these groups are at risk for severe illness due to COVID-19:  o People 65 years and older  o People who live in a nursing home or long-term care facility  o People with chronic disease (lung, heart, cancer, diabetes, kidney, liver, immunologic)   o People who have a weakened immune system, including those who:   Are in cancer treatment  Take medicine that weakens the immune system, such as corticosteroids  Had a bone marrow or organ transplant  Have an immune deficiency  Have poorly controlled HIV or AIDS  Are obese (body mass index of 40 or higher)  Smoke regularly   o Caregivers should wear gloves while washing dishes, handling laundry and cleaning bedrooms and bathrooms.  o Use caution when washing and drying laundry: Don't shake dirty laundry, and use the warmest water setting that you can.  o For more tips, go to www.cdc.gov/coronavirus/2019-ncov/downloads/10Things.pdf.      How can I take care of myself?   Get lots of rest. Drink extra fluids (unless a doctor has told you not to)   " Take Tylenol (acetaminophen) for fever or pain. If you have liver or kidney problems, ask your family doctor if it's okay to take Tylenol.   Adults can take either:    650 mg (two 325 mg pills) every 4 to 6 hours, or...   1,000 mg (two 500 mg pills) every 8 hours as needed.    Note: Don't take more than 3,000 mg in one day. Acetaminophen is found in many medicines (both prescribed and over-the-counter medicines). Read all labels to be sure you don't take too much.   For children, check the Tylenol bottle for the right dose. The dose is based on the child's age or weight.    If you have other health problems (like cancer, heart failure, an organ transplant or severe kidney disease): Call your specialty clinic if you don't feel better in the next 2 days.       Know when to call 911. Emergency warning signs include:    Trouble breathing or shortness of breath Pain or pressure in the chest that doesn't go away Feeling confused like you haven't felt before, or not being able to wake up Bluish-colored lips or face  Where can I get more information?   [facility] -- About COVID-19: [email or web address]   CDC -- What to Do If You're Sick: www.cdc.gov/coronavirus/2019-ncov/about/steps-when-sick.html   CDC -- Ending Home Isolation: www.cdc.gov/coronavirus/2019-ncov/hcp/disposition-in-home-patients.html   CDC -- Caring for Someone: www.cdc.gov/coronavirus/2019-ncov/if-you-are-sick/care-for-someone.html   [facility] -- Interim Guidance for Hospital Discharge to Home: www.health.[facility].[place]/diseases/coronavirus/hcp/hospdischarge.pdf   [facility] clinical trials (COVID-19 research studies): [email or web address]/umn-clinical-trials    Below are the COVID-19 hotlines at the [place] Department of Health ([facility]). Interpreters are available.    For health questions: Call 509-546-3914 or 1-213.487.6202 (7 a.m. to 7 p.m.) For questions about schools and childcare: Call 279-530-8645 or 1-479.829.7353 (7 a.m. to  7 p.m.)       Diagnosis: Cough  Diagnosis ICD: R05  Additional Clinician Notes:  Please get the covid testing done as soon as possible.&nbsp; Stay home until you can follow up with your primary provider next week.&nbsp; I am concerned with your description of worsening symptoms, although I see you just started on an antibiotic 2 days ago.&nbsp; If any worsening symptoms develop over the weekend, be seen in urgent care or the emergency department.&nbsp;&nbsp;

## 2020-10-11 NOTE — PROGRESS NOTES
SUBJECTIVE:   Sherri Lynn is a 34 year old female with a history of moderate persistent asthma. She is presenting with a chief complaint of a one-week history of cough and shortness of breath (worsening frequent shortness of breath while talking).  The oxygen saturation drops to 92% when supine or prone.    Onset of symptoms was September 23, 2020.  The symptoms have been worsening over the past seven days.  Patient was started on Oral Prednisone for five days.      Treatment measures tried include Albuterol MDI (a few times a day), Guaifenesin, Benadryl.  .She started Azithromycin four days ago.  Patient was told to restart Flonase and Fluticasone and was switched to Singulair from Claritin.  She also takes Duonebs Q4 hours.  No relief with Tessalon Perles.    Predisposing factors include     Patient has had right-sided rib pain since last night due to severe cough.  She would like Lidocaine Patches.  There is pain with deep inspiration and with any movement of the torso.  She has been taking Diclofenac for this pain.      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 These tests have been the rapid, same-day COVID-19 tests.     No loss of smell/taste  No headache  No fevers  No chest pain  No new body aches  There has been fatigue present.   There has been a mild sore throat  There has been a mild stuffy nose/runny nose  no vomiting/diarrhea    Past Medical History:    Moderate Persistent Asthma  Crohn's Disease  Inflammatory Arthritis    Current Outpatient Medications   Medication Sig Dispense Refill     albuterol (PROAIR HFA/PROVENTIL HFA/VENTOLIN HFA) 108 (90 Base) MCG/ACT inhaler Inhale 2 puffs into the lungs every 4 hours as needed for shortness of breath / dyspnea or wheezing 1 Inhaler 0     azithromycin (ZITHROMAX) 250 MG tablet Take 250 mg by mouth       etonogestrel  (NEXPLANON) 68 MG IMPL 1 each       fluticasone (FLONASE) 50 MCG/ACT nasal spray Spray 2 sprays into both nostrils daily 16 g 0     fluticasone (FLOVENT HFA) 110 MCG/ACT inhaler Inhale 2 puffs into the lungs 2 times daily       hydroxychloroquine (PLAQUENIL) 200 MG tablet Take 400 mg by mouth 2 times daily        ipratropium - albuterol 0.5 mg/2.5 mg/3 mL (DUONEB) 0.5-2.5 (3) MG/3ML neb solution Take 1 vial (3 mLs) by nebulization every 4 hours as needed for shortness of breath / dyspnea or wheezing 1 Box 0     montelukast (SINGULAIR) 10 MG tablet Take 10 mg by mouth       Turmeric Curcumin 500 MG CAPS Take 500 mg by mouth       Acetaminophen (TYLENOL PO)        Social History     Tobacco Use     Smoking status: Never Smoker     Smokeless tobacco: Never Used   Substance Use Topics     Alcohol use: Yes       ROS:  CONSTITUTIONAL:NEGATIVE  for fevers.   INTEGUMENTARY/SKIN: no cyanosis.    EYES: NEGATIVE for vision changes or irritation  RESP:positive for cough, shortness of breath.   CV:  No new chest pain.   GI: negative for vomiting/diarrhea.   MUSCULOSKELETAL: no new body aches.     OBJECTIVE:  /72   Pulse 91   Temp 97.9  F (36.6  C) (Tympanic)   Wt 81.6 kg (180 lb)   SpO2 97%   BMI 31.89 kg/m    GENERAL APPEARANCE: healthy, alert and no distress  HENT: oropharynx is within normal limits.   NECK: supple, nontender, no lymphadenopathy  RESP: lungs clear to auscultation - no rales, rhonchi or wheezes  CV: regular rates and rhythm, normal S1 S2, no murmur noted  SKIN: no suspicious lesions or rashes    ASSESSMENT:  Shortness of breath  Moderate persistent asthma with exacerbation.    Right Rib Pain, most likely secondary to the cough.    Cough      PLAN:  For the Cough:  Outpatient Rx:  Cheratussin AC  follow up if not better in 3-4 days.      For the shortness of breath and for the Asthma:  Continue the current asthma medications (Singulair, Fluticasone MDI, Albuterol MDI, Duonebs)  Outpatient Rx:   Dexamethasone.  Patient requested this medication, since she would like to have coverage for both Asthma attacks and also for possible COVID-19.    Go to the emergency room if you develop severe, worsening shortness of breath and persistently low oxygen saturation readings on the Pulse Ox monitor.    Pending lab:  COVID-19 test.    Her primary care provider ordered a pulmonary consult due the patient's current respiratory symptoms.     For the Rib Pain:  Rx:  Lidocaine patches  Ice  follow up with the primary care provider if not better      Vaibhav Ypi MD

## 2020-10-11 NOTE — LETTER
[facility] [place]  [address]  [address]  [place] [address] [phone]      October 11, 2020    RE:  Sherri Lynn                                                                                                                                                       [address]  [place] [address]            To whom it may concern:    Sherri Lynn is under my professional care at the [facility] on October 11, 2020.  She underwent a COVID-19 test during this clinic encounter.  As a result, please excuse her from work until this COVID-19 test result is known.  If negative, she may return to work immediately since has had frequent previous COVID-19 tests.  If positive she should self-isolate at home until she has been fever-free for 24 hours, provided that her other symptoms have improved, and provided that at least 10 days have passed since the start of symptoms.            Sincerely,        Vaibhav Yip MD    [facility]

## 2020-10-12 LAB
SARS-COV-2 RNA SPEC QL NAA+PROBE: NOT DETECTED
SPECIMEN SOURCE: NORMAL

## 2020-10-15 ENCOUNTER — APPOINTMENT (OUTPATIENT)
Dept: CT IMAGING | Facility: CLINIC | Age: 35
End: 2020-10-15
Attending: EMERGENCY MEDICINE
Payer: COMMERCIAL

## 2020-10-15 ENCOUNTER — RECORDS - HEALTHEAST (OUTPATIENT)
Dept: ADMINISTRATIVE | Facility: OTHER | Age: 35
End: 2020-10-15

## 2020-10-15 ENCOUNTER — HOSPITAL ENCOUNTER (EMERGENCY)
Facility: CLINIC | Age: 35
Discharge: HOME | End: 2020-10-15
Attending: EMERGENCY MEDICINE | Admitting: EMERGENCY MEDICINE
Payer: COMMERCIAL

## 2020-10-15 ENCOUNTER — OFFICE VISIT (OUTPATIENT)
Dept: URGENT CARE | Facility: URGENT CARE | Age: 35
End: 2020-10-15
Payer: COMMERCIAL

## 2020-10-15 VITALS
RESPIRATION RATE: 14 BRPM | TEMPERATURE: 98.6 F | HEIGHT: 63 IN | HEART RATE: 92 BPM | BODY MASS INDEX: 31.89 KG/M2 | SYSTOLIC BLOOD PRESSURE: 126 MMHG | DIASTOLIC BLOOD PRESSURE: 76 MMHG | WEIGHT: 180 LBS | OXYGEN SATURATION: 98 %

## 2020-10-15 VITALS
TEMPERATURE: 97.3 F | BODY MASS INDEX: 31.89 KG/M2 | OXYGEN SATURATION: 99 % | WEIGHT: 180 LBS | HEART RATE: 96 BPM | SYSTOLIC BLOOD PRESSURE: 115 MMHG | DIASTOLIC BLOOD PRESSURE: 79 MMHG

## 2020-10-15 DIAGNOSIS — M19.90 INFLAMMATORY ARTHRITIS: ICD-10-CM

## 2020-10-15 DIAGNOSIS — N83.202 LEFT OVARIAN CYST: ICD-10-CM

## 2020-10-15 DIAGNOSIS — R34 DECREASED URINE OUTPUT: ICD-10-CM

## 2020-10-15 DIAGNOSIS — R10.31 ABDOMINAL PAIN, RIGHT LOWER QUADRANT: ICD-10-CM

## 2020-10-15 DIAGNOSIS — K50.90 CROHN'S DISEASE WITHOUT COMPLICATION, UNSPECIFIED GASTROINTESTINAL TRACT LOCATION (H): ICD-10-CM

## 2020-10-15 DIAGNOSIS — K52.9 SEVERE DIARRHEA: Primary | ICD-10-CM

## 2020-10-15 DIAGNOSIS — R25.2 MUSCLE CRAMPS: ICD-10-CM

## 2020-10-15 DIAGNOSIS — R11.0 NAUSEA: ICD-10-CM

## 2020-10-15 DIAGNOSIS — R53.83 FATIGUE, UNSPECIFIED TYPE: ICD-10-CM

## 2020-10-15 LAB
ALBUMIN SERPL-MCNC: 3.7 G/DL (ref 3.4–5)
ALP SERPL-CCNC: 65 U/L (ref 40–150)
ALT SERPL W P-5'-P-CCNC: 69 U/L (ref 0–50)
ANION GAP SERPL CALCULATED.3IONS-SCNC: 7 MMOL/L (ref 3–14)
AST SERPL W P-5'-P-CCNC: 14 U/L (ref 0–45)
BASOPHILS # BLD AUTO: 0 10E9/L (ref 0–0.2)
BASOPHILS NFR BLD AUTO: 0 %
BILIRUB SERPL-MCNC: 0.2 MG/DL (ref 0.2–1.3)
BUN SERPL-MCNC: 13 MG/DL (ref 7–30)
CALCIUM SERPL-MCNC: 8.6 MG/DL (ref 8.5–10.1)
CHLORIDE SERPL-SCNC: 104 MMOL/L (ref 94–109)
CO2 SERPL-SCNC: 27 MMOL/L (ref 20–32)
CREAT SERPL-MCNC: 0.79 MG/DL (ref 0.52–1.04)
CRP SERPL-MCNC: <2.9 MG/L (ref 0–8)
DIFFERENTIAL METHOD BLD: ABNORMAL
EOSINOPHIL # BLD AUTO: 0.2 10E9/L (ref 0–0.7)
EOSINOPHIL NFR BLD AUTO: 1 %
ERYTHROCYTE [DISTWIDTH] IN BLOOD BY AUTOMATED COUNT: 12.2 % (ref 10–15)
GFR SERPL CREATININE-BSD FRML MDRD: >90 ML/MIN/{1.73_M2}
GLUCOSE SERPL-MCNC: 88 MG/DL (ref 70–99)
HCG SERPL QL: NEGATIVE
HCT VFR BLD AUTO: 41.4 % (ref 35–47)
HGB BLD-MCNC: 12.8 G/DL (ref 11.7–15.7)
LIPASE SERPL-CCNC: 200 U/L (ref 73–393)
LYMPHOCYTES # BLD AUTO: 7.6 10E9/L (ref 0.8–5.3)
LYMPHOCYTES NFR BLD AUTO: 38 %
MCH RBC QN AUTO: 30 PG (ref 26.5–33)
MCHC RBC AUTO-ENTMCNC: 30.9 G/DL (ref 31.5–36.5)
MCV RBC AUTO: 97 FL (ref 78–100)
MONOCYTES # BLD AUTO: 1.6 10E9/L (ref 0–1.3)
MONOCYTES NFR BLD AUTO: 8 %
NEUTROPHILS # BLD AUTO: 10.5 10E9/L (ref 1.6–8.3)
NEUTROPHILS NFR BLD AUTO: 53 %
PLATELET # BLD AUTO: 300 10E9/L (ref 150–450)
PLATELET # BLD EST: ABNORMAL 10*3/UL
POTASSIUM SERPL-SCNC: 3.8 MMOL/L (ref 3.4–5.3)
PROT SERPL-MCNC: 7.8 G/DL (ref 6.8–8.8)
RBC # BLD AUTO: 4.27 10E12/L (ref 3.8–5.2)
RBC MORPH BLD: ABNORMAL
SODIUM SERPL-SCNC: 138 MMOL/L (ref 133–144)
WBC # BLD AUTO: 19.9 10E9/L (ref 4–11)

## 2020-10-15 PROCEDURE — 99285 EMERGENCY DEPT VISIT HI MDM: CPT | Mod: 25

## 2020-10-15 PROCEDURE — 250N000011 HC RX IP 250 OP 636: Performed by: EMERGENCY MEDICINE

## 2020-10-15 PROCEDURE — 84703 CHORIONIC GONADOTROPIN ASSAY: CPT | Performed by: EMERGENCY MEDICINE

## 2020-10-15 PROCEDURE — 86140 C-REACTIVE PROTEIN: CPT | Performed by: EMERGENCY MEDICINE

## 2020-10-15 PROCEDURE — 94640 AIRWAY INHALATION TREATMENT: CPT

## 2020-10-15 PROCEDURE — 85025 COMPLETE CBC W/AUTO DIFF WBC: CPT | Performed by: EMERGENCY MEDICINE

## 2020-10-15 PROCEDURE — 74177 CT ABD & PELVIS W/CONTRAST: CPT

## 2020-10-15 PROCEDURE — 96374 THER/PROPH/DIAG INJ IV PUSH: CPT | Mod: 59

## 2020-10-15 PROCEDURE — 80053 COMPREHEN METABOLIC PANEL: CPT | Performed by: EMERGENCY MEDICINE

## 2020-10-15 PROCEDURE — 96376 TX/PRO/DX INJ SAME DRUG ADON: CPT

## 2020-10-15 PROCEDURE — 258N000003 HC RX IP 258 OP 636: Performed by: EMERGENCY MEDICINE

## 2020-10-15 PROCEDURE — 250N000013 HC RX MED GY IP 250 OP 250 PS 637: Performed by: EMERGENCY MEDICINE

## 2020-10-15 PROCEDURE — 96375 TX/PRO/DX INJ NEW DRUG ADDON: CPT

## 2020-10-15 PROCEDURE — 96361 HYDRATE IV INFUSION ADD-ON: CPT

## 2020-10-15 PROCEDURE — 250N000009 HC RX 250: Performed by: EMERGENCY MEDICINE

## 2020-10-15 PROCEDURE — 99215 OFFICE O/P EST HI 40 MIN: CPT | Performed by: PHYSICIAN ASSISTANT

## 2020-10-15 PROCEDURE — 83690 ASSAY OF LIPASE: CPT | Performed by: EMERGENCY MEDICINE

## 2020-10-15 RX ORDER — HYDROMORPHONE HYDROCHLORIDE 1 MG/ML
0.5 INJECTION, SOLUTION INTRAMUSCULAR; INTRAVENOUS; SUBCUTANEOUS ONCE
Status: COMPLETED | OUTPATIENT
Start: 2020-10-15 | End: 2020-10-15

## 2020-10-15 RX ORDER — DICYCLOMINE HCL 20 MG
20 TABLET ORAL 4 TIMES DAILY PRN
Qty: 15 TABLET | Refills: 0 | Status: SHIPPED | OUTPATIENT
Start: 2020-10-15 | End: 2021-11-18

## 2020-10-15 RX ORDER — ONDANSETRON 2 MG/ML
8 INJECTION INTRAMUSCULAR; INTRAVENOUS ONCE
Status: COMPLETED | OUTPATIENT
Start: 2020-10-15 | End: 2020-10-15

## 2020-10-15 RX ORDER — ALBUTEROL SULFATE 90 UG/1
4 AEROSOL, METERED RESPIRATORY (INHALATION) ONCE
Status: COMPLETED | OUTPATIENT
Start: 2020-10-15 | End: 2020-10-15

## 2020-10-15 RX ORDER — ONDANSETRON 4 MG/1
4 TABLET, ORALLY DISINTEGRATING ORAL EVERY 6 HOURS PRN
Qty: 10 TABLET | Refills: 0 | Status: SHIPPED | OUTPATIENT
Start: 2020-10-15 | End: 2020-10-18

## 2020-10-15 RX ORDER — IOPAMIDOL 755 MG/ML
500 INJECTION, SOLUTION INTRAVASCULAR ONCE
Status: COMPLETED | OUTPATIENT
Start: 2020-10-15 | End: 2020-10-15

## 2020-10-15 RX ADMIN — ONDANSETRON 8 MG: 2 INJECTION INTRAMUSCULAR; INTRAVENOUS at 11:42

## 2020-10-15 RX ADMIN — SODIUM CHLORIDE 1000 ML: 9 INJECTION, SOLUTION INTRAVENOUS at 11:40

## 2020-10-15 RX ADMIN — IOPAMIDOL 91 ML: 755 INJECTION, SOLUTION INTRAVENOUS at 13:13

## 2020-10-15 RX ADMIN — ALBUTEROL SULFATE 4 PUFF: 90 AEROSOL, METERED RESPIRATORY (INHALATION) at 13:07

## 2020-10-15 RX ADMIN — HYDROMORPHONE HYDROCHLORIDE 0.5 MG: 1 INJECTION, SOLUTION INTRAMUSCULAR; INTRAVENOUS; SUBCUTANEOUS at 12:30

## 2020-10-15 RX ADMIN — SODIUM CHLORIDE 63 ML: 9 INJECTION, SOLUTION INTRAVENOUS at 13:13

## 2020-10-15 ASSESSMENT — ENCOUNTER SYMPTOMS
DYSURIA: 0
NAUSEA: 1
SHORTNESS OF BREATH: 0
BLOOD IN STOOL: 0
FEVER: 0
DIARRHEA: 1

## 2020-10-15 ASSESSMENT — MIFFLIN-ST. JEOR: SCORE: 1495.6

## 2020-10-15 ASSESSMENT — PAIN SCALES - GENERAL: PAINLEVEL: EXTREME PAIN (8)

## 2020-10-15 NOTE — ED AVS SNAPSHOT
[facility]  [address]  [facility] [address]  Phone: [phone]  Fax: [phone]                                    Sherri Lynn   MRN: [id number]    Department: [facility]   Date of Visit: 10/15/2020           After Visit Summary Signature Page    I have received my discharge instructions, and my questions have been answered. I have discussed any challenges I see with this plan with the nurse or doctor.    ..........................................................................................................................................  Patient/Patient Representative Signature      ..........................................................................................................................................  Patient Representative Print Name and Relationship to Patient    ..................................................               ................................................  Date                                   Time    ..........................................................................................................................................  Reviewed by Signature/Title    ...................................................              ..............................................  Date                                               Time          22EPIC Rev 08/18

## 2020-10-15 NOTE — PROGRESS NOTES
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  She specifically reports personal concern for ileus.     HPI:     Patient states she developed a feeling of abdominal bloating and constipation 3 days ago. Patient states that is very unusual for her/states she typically has 2-4 BM per day. Abdomen felt bloated enough that she relates it to looking  Like abdomen  Was \"15 months pregnant\". Patient tried OTC Senna tabs, magnesium powder, Colace, Miralax and a rectal glycerine suppository.     At approximately 11:30 pm  Last night, patient states she developed profuse, watery diarrhea and nausea. She estimates she has passed at least 15-20 large volume watery stools since onset. She denies any black or bloody stools. She admits to cramping pain across entire lower abdomen (R>L). No vomiting.       Personal  GI Hx: Chron's Dz. No hx of GI surgery. No hx of bowel obstruction      PMHX:  Non-specific inflammatory arthritis, intermittentasthma, Allergies, Crohn's Dz    SURG HX: Carpal tunnel release     SOC HX: Single. NP--[facility]      FMHX: 1 brother healthy. 1 sister GB dz (gallstones) but otherwise healthy. Mother and father healthy.     Patient Active Problem List   Diagnosis     CARDIOVASCULAR SCREENING; LDL GOAL LESS THAN 160     Crohn's disease (H)     Carpal tunnel syndrome     Intermittent asthma     Inflammatory arthritis   Asthma   Allergies       Current Outpatient Medications   Medication     albuterol (PROAIR HFA/PROVENTIL HFA/VENTOLIN HFA) 108 (90 Base) MCG/ACT inhaler     dexamethasone (DECADRON) 6 MG tablet     etonogestrel (NEXPLANON) 68 MG IMPL     " "fluticasone (FLONASE) 50 MCG/ACT nasal spray     fluticasone (FLOVENT HFA) 110 MCG/ACT inhaler     guaiFENesin-codeine (ROBITUSSIN AC) 100-10 MG/5ML solution     hydroxychloroquine (PLAQUENIL) 200 MG tablet     ipratropium - albuterol 0.5 mg/2.5 mg/3 mL (DUONEB) 0.5-2.5 (3) MG/3ML neb solution     lidocaine (LIDODERM) 5 % patch     montelukast (SINGULAIR) 10 MG tablet     Turmeric Curcumin 500 MG CAPS     Acetaminophen (TYLENOL PO)     No current facility-administered medications for this visit.        Allergies   Allergen Reactions     Antazoline Other (See Comments)     Nausea and vomiting.     Augmentin      Azathioprine      Clindamycin      Flu Virus Vaccine      Keflex [Cephalexin Hcl]      Pneumococcal Vaccines      Sulfa Drugs      Zithromax [Azithromycin Dihydrate]      Review Of Systems  Consitutional:  Positive for acute onset fatigue and chills. Denies any fever  Skin: negative for rash or jaundice   Eyes: negative for, eye pain, photophobia, redness  Ears/Nose/Throat: negative for, nasal congestion, postnasal drainage or sore throat   Respiratory:  Positive for 3 week hx of dry cough  And wheezing. Patient was seen on 10/11/20 and tx with Dexamethasone. Patient states respiratory sxs are improving/not worsening. She denies any productive cough since onset. Denies any severe SOB. Vaishali any hemoptysis  Cardiovascular: negative for, palpitations, irregular heart beat, chest pain, exertional chest pain or pressure, lower extremity edema and syncope or near-syncope  Gastrointestinal: Positive as per HPI. Denies any black or bloody stools.   GYN: Single. Not SA. LMP was approximately 3 months ago--patient has Nexplanon and states that is \"normal\"  For her. Denies any acute GYN sxs.   Urologic: Positive for decreased UOP. Patient states she has not urinated for an estimated 11 hours. Denies any dysuria, urinary urgency/frequency or hematuria   Musculoskeletal: Positive for acute, generalized myalgias and leg " spasms since onset of diarrhea   Neurologic: Negative for any acute pne sided body weakness, acute visual or acute speech changes   Hematologic/Lymphatic/Immunologic: Positive for past hx of Chron's related iron deficiency anemia  In distant  Past/states has had normal hgb more  Recently. Denies any known bleeding or clotting d/o.   Endocrine: Negative for Hx  Of DM  Rheum: Positive hx of RH negative inflammatory arthritis. Positive for acute onset increased left hip pain since onset of diarrhea       OBJECTIVE:  /79   Pulse 96   Temp 97.3  F (36.3  C) (Tympanic)   Wt 81.6 kg (180 lb)   SpO2 99%   BMI 31.89 kg/m          GENERAL APPEARANCE:  Alert. NAD   SKIN: No rash. No lesions. Good skin turgor  HEENT:   Conjunctiva without infection.  Sclera clear.  Left TM is normal: no effusions, no erythema, and normal landmarks.  Right TM is normal: no effusions, no erythema, and normal landmarks.  Nasal mucosa is normal.  Oropharyngeal exam is noted to be somewhat dry but otherwise unremarkable: no lesions, erythema, adenopathy or exudate. No dry, cracked lips.   Neck is supple, FROM with no adenopathy  RESP: lungs clear to auscultation - no rales, rhonchi or wheezes  CV: regular rates and rhythm, normal S1 S2, no murmur noted  ABDOMEN:  Positive for  Generalized, bilateral tenderness on exam (R>L).  No guarding or rebound tenderness. Soft bowel sounds.   NEURO: Normal strength and tone, sensory exam grossly normal,  normal speech and mentation      ASSESSMENT/PLAN:      PPE WORN  TODAY: Of Note: I did wear PAPR, gown and gloves today while face-to-face in exam room with patient today.       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  Patient, herself, reports personal concern for ileus.     Differential includes but is not limited to infectious or inflammatory colitis, illus, bowel obstruction, GI abscess, pancreatitis, appendicitis, electrolyte disturbance, occult UTI.     Plan: I have advised patient she should go to ER and remain NPO until otherwise directed by ER MD. Father will drive her to [facility]. I  Phoned ahead to give report to [facility] ER RN (Vanesa) and they are expecting arrival via private car shortly.     (R34) Decreased urine output      (K50.90) Crohn's disease without complication, unspecified gastrointestinal tract location (H)      (R25.2) Muscle cramps      (R53.83) Fatigue, unspecified type      (R11.0) Nausea      (M19.90) Inflammatory arthritis

## 2020-10-15 NOTE — DISCHARGE INSTRUCTIONS
YOU WERE NOTED TO HAVE A LEFT OVARIAN CYST MEASURING 3.6 CM. PLEASE HAVE THIS MONITORED BY YOUR GYNECOLOGIST OR PRIMARY CARE PHYSICIAN.    Discharge Instructions  Abdominal Pain    Abdominal pain (belly pain) can be caused by many things. Your evaluation today does not show the exact cause for your pain. Your provider today has decided that it is unlikely your pain is due to a life threatening problem, or a problem requiring surgery or hospital admission. Sometimes those problems cannot be found right away, so it is very important that you follow up as directed.  Sometimes only the changes which occur over time allow the cause of your pain to be found.    Generally, every Emergency Department visit should have a follow-up clinic visit with either a primary or a specialty clinic/provider. Please follow-up as instructed by your emergency provider today. With abdominal pain, we often recommend very close follow-up, such as the following day.    ADULTS:  Return to the Emergency Department right away if:    You get an oral temperature above 102oF or as directed by your provider.  You have blood in your stools. This may be bright red or appear as black, tarry stools.    You keep vomiting (throwing up) or cannot drink liquids.  You see blood when you vomit.   You cannot have a bowel movement or you cannot pass gas.  Your stomach gets bloated or bigger.  Your skin or the whites of your eyes look yellow.  You faint.  You have bloody, frequent or painful urination (peeing).  You have new symptoms or anything that worries you.    CHILDREN:  Return to the Emergency Department right away if your child has any of the above-listed symptoms or the following:    Pushes your hand away or screams/cries when his/her belly is touched.  You notice your child is very fussy or weak.  Your child is very tired and is too tired to eat or drink.  Your child is dehydrated.  Signs of dehydration can be:  Significant change in the amount of wet  diapers/urine.  Your infant or child starts to have dry mouth and lips, or no saliva (spit) or tears.    PREGNANT WOMEN:  Return to the Emergency Department right away if you have any of the above-listed symptoms or the following:    You have bleeding, leaking fluid or passing tissue from the vagina.  You have worse pain or cramping, or pain in your shoulder or back.  You have vomiting that will not stop.  You have a temperature of 100oF or more.  Your baby is not moving as much as usual.  You faint.  You get a bad headache with or without eye problems and abdominal pain.  You have a seizure.  You have unusual discharge from your vagina and abdominal pain.    Abdominal pain is pretty common during pregnancy.  Your pain may or may not be related to your pregnancy. You should follow-up closely with your OB provider so they can evaluate you and your baby.  Until you follow-up with your regular provider, do the following:     Avoid sex and do not put anything in your vagina.  Drink clear fluids.  Only take medications approved by your provider.    MORE INFORMATION:    Appendicitis:  A possible cause of abdominal pain in any person who still has their appendix is acute appendicitis. Appendicitis is often hard to diagnose.  Testing does not always rule out early appendicitis or other causes of abdominal pain. Close follow-up with your provider and re-evaluations may be needed to figure out the reason for your abdominal pain.    Follow-up:  It is very important that you make an appointment with your clinic and go to the appointment.  If you do not follow-up with your primary provider, it may result in missing an important development which could result in permanent injury or disability and/or lasting pain.  If there is any problem keeping your appointment, call your provider or return to the Emergency Department.    Medications:  Take your medications as directed by your provider today.  Before using over-the-counter  "medications, ask your provider and make sure to take the medications as directed.  If you have any questions about medications, ask your provider.    Diet:  Resume your normal diet as much as possible, but do not eat fried, fatty or spicy foods while you have pain.  Do not drink alcohol or have caffeine.  Do not smoke tobacco.    Probiotics: If you have been given an antibiotic, you may want to also take a probiotic pill or eat yogurt with live cultures. Probiotics have \"good bacteria\" to help your intestines stay healthy. Studies have shown that probiotics help prevent diarrhea (loose stools) and other intestine problems (including C. diff infection) when you take antibiotics. You can buy these without a prescription in the pharmacy section of the store.     If you were given a prescription for medicine here today, be sure to read all of the information (including the package insert) that comes with your prescription.  This will include important information about the medicine, its side effects, and any warnings that you need to know about.  The pharmacist who fills the prescription can provide more information and answer questions you may have about the medicine.  If you have questions or concerns that the pharmacist cannot address, please call or return to the Emergency Department.       Remember that you can always come back to the Emergency Department if you are not able to see your regular provider in the amount of time listed above, if you get any new symptoms, or if there is anything that worries you.    "

## 2020-10-15 NOTE — ED PROVIDER NOTES
History     Chief Complaint:  Abdominal Pain    HPI   Sherri Lynn is a 34 year old female who presents with diarrhea, abdominal discomfort and concerns of dehydration.  Over the last few weeks patient has suffered an asthma exacerbation with suspected viral bronchitis.  She has been ruled out for COVID with multiple tests.  She ultimately was initiated on Decadron and feels that her respiratory symptoms are largely improving.  Over the course the last 4 to 5 days she reported significant constipation despite a history of Crohn's disease and a baseline of 4 nonbloody bowel movements per day.  She took a number of enemas and laxatives resulting in 15-20 episodes of diarrhea over the last 24 hours.  Along with these symptoms, she has generalized abdominal discomfort where she feels bloated and full with some degree of increased discomfort in the right lower quadrant.  There have been no obvious alleviating or aggravating factors.  She denies any hematochezia, melena, fever.  She has no prior history of intra-abdominal surgeries.    Allergies:  Antazoline  Augmentin  Azathioprine  Clindamycin  Keflex  Flu virus vaccine  Pneumococcal vaccines  Sulfa drugs  Zithromax     Medications:    Albuterol inhaler  Decadron  Nexplanon  Fluticasone inhaler  Plaquenil  Duoneb  Singulair    Past Medical History:    Asthma  Carpal tunnel syndrome  Chron's disease  Hyperlipidemia     Past Surgical History:    Carpal tunnel release    Family History:    Asthma  Hypertension     Social History:  Smoking status: No  Alcohol use: Yes  Drug use: No  Patient presents alone.  PCP: Megan Carlson    Marital Status:  Single      Review of Systems   Constitutional: Negative for fever.   Respiratory: Negative for shortness of breath.    Cardiovascular: Negative for chest pain.   Gastrointestinal: Positive for diarrhea and nausea. Negative for blood in stool.   Genitourinary: Negative for dysuria, vaginal bleeding and vaginal discharge.   All  "other systems reviewed and are negative.    Physical Exam     Patient Vitals for the past 24 hrs:   BP Temp Temp src Pulse Resp SpO2 Height Weight   10/15/20 1300 120/63 -- -- 92 -- 98 % -- --   10/15/20 1200 109/65 -- -- 86 -- 100 % -- --   10/15/20 1100 121/81 -- -- (!) 47 -- 96 % -- --   10/15/20 1048 (!) 141/90 98.6  F (37  C) Oral 88 14 98 % 1.6 m (5' 3\") 81.6 kg (180 lb)      Physical Exam    Constitutional:  Pleasant, age appropriate.       Resting comfortably in the bed.  Eyes:    Conjunctiva normal  Neck:    Supple, no meningismus.     CV:     Regular rate and rhythm.      No murmurs, rubs or gallops.     No lower extremity edema.  PULM:    Clear to auscultation bilateral.       No respiratory distress.      Good air exchange.     No rales or wheezing.  ABD:    Soft, non-distended.       Mild tenderness in the RLQ.      Negative Rovsing's sign     Bowel sounds normal.     No pulsatile masses.       No rebound, guarding or rigidity.     No CVA tenderness.   MSK:     No gross deformity to all four extremities.   LYMPH:   No cervical lymphadenopathy.  NEURO:   Alert.  Good muscular tone, no atrophy.   Skin:    Warm, dry and intact.    Psych:    Mood is good and affect is appropriate.      Emergency Department Course   Imaging:  CT Abdomen Pelvis w/ Contrast  IMPRESSION:   1.  Diffuse fatty infiltration of the liver and probable hepatomegaly.  2.  A left ovarian cystic lesion measures 3.6 cm. Pelvic ultrasound  may be helpful for further characterization.  Reading per radiology.      Radiographic findings were communicated with the patient who voiced understanding of the findings.    Laboratory:  CBC: WBC 19.9 (H), HGB 12.8,       CMP: ALT: 69 (H), o/w WNL (Creatinine: 0.79)     Lipase: 200    CRP inflammation: <2.9    HCG qualitative pregnancy (blood): Negative    Interventions:  1140 0.9% Sodium Chloride BOLUS 1000 mLs IV    1142 Zofran 8 mg IV  1230 Dilaudid 0.5 mg IV  1307 Albuterol inhaler, 4 puffs " given     Emergency Department Course:  1033 Nursing notes and vitals reviewed. I performed an exam of the patient as documented above.     IV inserted. Medicine administered as documented above. Blood drawn. This was sent to the lab for further testing, results above.    The patient was sent for a CT while in the emergency department, findings above.     1339 I rechecked the patient and discussed the results of her workup thus far.     Findings and plan explained to the Patient. Patient discharged home with instructions regarding supportive care, medications, and reasons to return. The importance of close follow-up was reviewed. The patient was prescribed Zofran and dicyclomine.    I personally reviewed the laboratory and imaging results with the Patient and answered all related questions prior to discharge.      Impression & Plan        Medical Decision Makin-year-old female with history of Crohn's disease presented to the ED with abdominal bloating, nausea and diarrhea related to laxative use who was concerned primarily for dehydration.  Overall she appears well.  She did have isolated right lower quadrant tenderness.  Initial laboratory studies revealed leukocytosis of 19,000 but may be related to recent initiation of steroids.  The remainder of her laboratory studies were unrevealing.  On reexamination, she continued to have right lower quadrant abdominal discomfort thus we collectively agreed that radiographic imaging was necessary to rule out appendicitis.  CT scan was undertaken revealing no acute intra-abdominal pathology.  She was incidentally noted to have a left ovarian cyst.  Pain likely related to laxative use inducing copious diarrhea and mild dehydration.  She feels much improved and safe for discharge home.  Supportive measures indicated and follow-up with PCP or gynecologist regarding her ovarian cyst.    Diagnosis:    ICD-10-CM    1. Abdominal pain, right lower quadrant  R10.31    2. Left  ovarian cyst  N83.202        Disposition:  discharged to home    Discharge Medications:  New Prescriptions    DICYCLOMINE (BENTYL) 20 MG TABLET    Take 1 tablet (20 mg) by mouth 4 times daily as needed (abdominal pain)    ONDANSETRON (ZOFRAN ODT) 4 MG ODT TAB    Take 1 tablet (4 mg) by mouth every 6 hours as needed for nausea       Melisa Hahn  10/15/2020   [facility] EMERGENCY DEPT    Scribe Disclosure:  I, Melisa Hahn, am serving as a scribe at 10:33 AM on 10/15/2020 to document services personally performed by Erickson Pacheco MD based on my observations and the provider's statements to me.         Erickson Pacheco MD  10/15/20 [id number]

## 2020-10-15 NOTE — ED TRIAGE NOTES
"Sick for 3 weeks, has had multiple negative covid tests. Pt had a rib fx on Sunday and was given robitussin with codeine. Developed constipation, so started medication help with that including senna, miralax, fleet suppository. Has a hx of crohns, reports 15 watery stools last night. C/o still feeling \"full\" and feels like she is still distended. Pt also c/o nausea and feels like she might be dehydrated. 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   "

## 2020-10-16 ENCOUNTER — AMBULATORY - HEALTHEAST (OUTPATIENT)
Dept: PULMONOLOGY | Facility: OTHER | Age: 35
End: 2020-10-16

## 2020-10-16 DIAGNOSIS — J45.21 MILD INTERMITTENT ASTHMA WITH EXACERBATION: ICD-10-CM

## 2020-10-19 ENCOUNTER — VIRTUAL VISIT (OUTPATIENT)
Dept: FAMILY MEDICINE | Facility: OTHER | Age: 35
End: 2020-10-19

## 2020-10-19 ENCOUNTER — COMMUNICATION - HEALTHEAST (OUTPATIENT)
Dept: FAMILY MEDICINE | Facility: CLINIC | Age: 35
End: 2020-10-19

## 2020-10-19 NOTE — PROGRESS NOTES
"Date: 10/19/2020 08:41:19  Clinician: Ted Dumont  Clinician NPI: [id number]  Patient: Sherri Lynn  Patient : 1985  Patient Address: [address], [place] [address]  Patient Phone: [phone]  Visit Protocol: URI  Patient Summary:  Sherri is a 34 year old ( : 1985 ) female who initiated a [facility] Visit for cold, sinus infection, or influenza. When asked the question \"Please sign me up to receive news, health information and promotions from [facility].\", Sherri responded \"No\".    Sherri states her symptoms started gradually 2-3 weeks ago. After her symptoms started, they improved and then got worse again.   Her symptoms consist of wheezing, a cough, myalgia, chills, malaise, and a sore throat.   Symptom details     Cough: Sherri coughs every 5-10 minutes and her cough is more bothersome at night. Phlegm does not come into her throat when she coughs. She does not believe her cough is caused by post-nasal drip.     Sore throat: Sherri reports having mild throat pain (1-3 on a 10 point pain scale), does not have exudate on her tonsils, and can swallow liquids. The lymph nodes in her neck are not enlarged. A rash has not appeared on the skin since the sore throat started.     Wheezing: Sherri has been diagnosed with asthma. Additional wheezing details as reported by the patient (free text): MINESH Polanco denies having ear pain, headache, fever, enlarged lymph nodes, nasal congestion, anosmia, vomiting, rhinitis, nausea, facial pain or pressure, teeth pain, ageusia, and diarrhea. She also denies having recent facial or sinus surgery in the past 60 days.   Precipitating events  Within the past week, Sherri has not been exposed to someone with strep throat. She has not recently been exposed to someone with influenza. Sherri has been in close contact with the following high risk individuals: adults 65 or older, people with asthma, heart disease or diabetes, and immunocompromised " people.   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   She either works or volunteers as a healthcare worker or a , or works or volunteers in a healthcare facility. 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 She lives with a healthcare worker.   Sherri has not had a close contact with a laboratory-confirmed COVID-19 patient within 14 days of symptom onset.   Since December 2019, Sherri and has had upper respiratory infection (URI) or influenza-like illness. Has not been diagnosed with lab-confirmed COVID-19 test      Date(s) of previous URI or influenza-like illness (free-text): March 2020     Symptoms Sherri experienced during previous URI or influenza-like illness as reported by the patient (free-text): cough, wheezing, fever, chills, malaise        Triage Point(s) temporarily suspended for COVID-19 (Coronavirus) screening  Sherri reported the following symptoms which were previously protocol referral points. These protocol referral points have temporarily been removed for purposes of COVID-19 (Coronavirus) screening.   Difficulty breathing even when resting and can only speak in phrase(s)   Pertinent medical history  Sherri has taken an antibiotic medication in the past month. Antibiotic details as reported by the patient (free text): azithromycin- bronchitis   Sherri does not get yeast infections when she takes antibiotics.   Sherri needs a return to work/school note.   Weight: 180 lbs   Sherri does not smoke or use smokeless tobacco.   She denies pregnancy and denies breastfeeding. She does not menstruate.   Additional information as reported by the patient (free text): dexamethasone 6 mg daily ended yesterday- can I have steroid taper?   Weight: 180 lbs    MEDICATIONS: zinc oral,  ascorbic acid (vitamin C) oral, Pepcid oral, Singulair oral, fluticasone propionate nasal, ipratropium-albuterol inhalation, boswellia jossy extract (bulk), turmeric-turmeric root extract oral, Guaifenesin AC oral, fluticasone propionate inhalation, albuterol sulfate inhalation, Plaquenil oral, ALLERGIES: Imuran, Influenza Virus Vaccines, Keflex, Augmentin, Bactrim  Clinician Response:  Dear Sherri,  I am sorry you are not feeling well. To determine the most appropriate care for you, I would like you to be seen in person to further discuss your health history and symptoms.  You will not be charged for this [facility] Visit. Thank you for trusting us with your care.  COVID-19 (Coronavirus) General Information  Because there is currently no vaccine to prevent infection, the best way to protect yourself is to avoid being exposed to this virus. Common symptoms of COVID-19 include but are not limited to fever, cough, and shortness of breath. These symptoms appear 2-14 days after you are exposed to the virus that causes COVID-19. Click here for more information from the CDC on how to protect yourself.  If you are sick with COVID-19 or suspect you are infected with the virus that causes COVID-19, follow the steps here from the CDC to help prevent the disease from spreading to people in your home and community.  Click here for general information from the CDC on testing.  If you develop any of these emergency warning signs for COVID-19, get medical attention immediately:     Trouble breathing    Persistent pain or pressure in the chest    New confusion or inability to arouse    Bluish lips or face      Call your doctor or clinic before going in. Call 911 if you have a medical emergency and notify the  you have or think you may have COVID-19.  For more detailed and up to date information on COVID-19 (Coronavirus), please visit the CDC website.   Diagnosis: Refer for additional evaluation  Diagnosis ICD: R69

## 2020-10-22 ENCOUNTER — OFFICE VISIT - HEALTHEAST (OUTPATIENT)
Dept: FAMILY MEDICINE | Facility: CLINIC | Age: 35
End: 2020-10-22

## 2020-10-22 DIAGNOSIS — J45.41 MODERATE PERSISTENT ASTHMA WITH EXACERBATION: ICD-10-CM

## 2020-10-22 DIAGNOSIS — J45.40 MODERATE PERSISTENT ASTHMA WITHOUT COMPLICATION: ICD-10-CM

## 2020-10-22 DIAGNOSIS — F41.1 GAD (GENERALIZED ANXIETY DISORDER): ICD-10-CM

## 2020-11-07 ENCOUNTER — HEALTH MAINTENANCE LETTER (OUTPATIENT)
Age: 35
End: 2020-11-07

## 2020-11-09 ENCOUNTER — RESULTS ONLY (OUTPATIENT)
Dept: LAB | Age: 35
End: 2020-11-09

## 2020-11-09 DIAGNOSIS — Z20.822 COVID-19 RULED OUT: Primary | ICD-10-CM

## 2020-11-09 LAB
SARS-COV-2 RNA SPEC QL NAA+PROBE: NORMAL
SPECIMEN SOURCE: NORMAL

## 2020-11-10 LAB
LABORATORY COMMENT REPORT: NORMAL
SARS-COV-2 RNA SPEC QL NAA+PROBE: NEGATIVE
SPECIMEN SOURCE: NORMAL

## 2020-11-12 ENCOUNTER — COMMUNICATION - HEALTHEAST (OUTPATIENT)
Dept: SCHEDULING | Facility: CLINIC | Age: 35
End: 2020-11-12

## 2020-11-20 ENCOUNTER — COMMUNICATION - HEALTHEAST (OUTPATIENT)
Dept: FAMILY MEDICINE | Facility: CLINIC | Age: 35
End: 2020-11-20

## 2020-11-20 DIAGNOSIS — F41.1 GAD (GENERALIZED ANXIETY DISORDER): ICD-10-CM

## 2020-12-17 ENCOUNTER — HOSPITAL ENCOUNTER (OUTPATIENT)
Dept: RESPIRATORY THERAPY | Facility: CLINIC | Age: 35
Discharge: HOME | End: 2020-12-17
Attending: INTERNAL MEDICINE

## 2021-03-27 ENCOUNTER — HEALTH MAINTENANCE LETTER (OUTPATIENT)
Age: 36
End: 2021-03-27

## 2021-05-27 ASSESSMENT — PATIENT HEALTH QUESTIONNAIRE - PHQ9
SUM OF ALL RESPONSES TO PHQ QUESTIONS 1-9: 10
SUM OF ALL RESPONSES TO PHQ QUESTIONS 1-9: 12

## 2021-05-27 NOTE — TELEPHONE ENCOUNTER
Called and spoke with patient about positive ESR. Patient was aware of the lab results through [facility] already. She had scheduled an appointment with her PCP on April 1st already. Patient would like to wait until then to discuss further lab work versus rheumatology referral at that time. She states that her symptom have improved since starting the course of steroids.

## 2021-05-27 NOTE — TELEPHONE ENCOUNTER
Patient Returning Call  Reason for call:  Patient returned call   Information relayed to patient:  Patient is not happy about the is message and is wondering what the plan is now. She stated I have been on steroids since Sunday and wonder how accurate these labs would be now.  I also want to add the YAIMA and the rheumatoid factor to the labs.  Patient has additional questions:  Yes  If YES, what are your questions/concerns:  Patient stated I never received a voice message regarding the message below and would like to know what the follow up plan is. Can I have these labs re-drawn at [facility] as this is closer to my job?   Okay to leave a detailed message?: Yes     Patient has no PCP currently.

## 2021-05-27 NOTE — TELEPHONE ENCOUNTER
Sherri  was drawn yesterday. Unfortunately the  didn't pick the samples  and I had to  Cancel and reorder the blood test. I called and left voice message hserri

## 2021-05-27 NOTE — PROGRESS NOTES
Assessment/plan   Sherri Lynn is a 33 y.o. female who is  establish patient to my practice here with   Chief Complaint   Patient presents with     Joint Pain     associated w/ swelling and stiffness x1.5 months. Pt states mostly in bilater MCP and PIP joints which is making it difficult to perform her tamera duties at work. Began a course of prednisone on 03/24/2019.        Sherri was seen today for joint pain.    Diagnoses and all orders for this visit:    Crohn's disease of small intestine without complication (H)  Comments:  following functional diet, last flare 1 yr  ago , had colonoscopy last yr was WNL     Depression, major, recurrent, mild (H)    Bilateral carpal tunnel syndrome    Multiple joint pain  -     Ambulatory referral to Rheumatology      We will refer her to rheumatologist for further evaluation and treatment, patient symptoms do points towards may be seronegative rheumatoid arthritis as symptoms markedly improved with the help of prednisone    There are no Patient Instructions on file for this visit.  Subjective:      HPI: Sherri Lynn is a 33 y.o. female is here for Follow up on swelling and pain both hands  Patient was started on burst of prednisone which seems to dramatically improve her symptoms somehow her labs for rheumatoid factor CRP were done 3-day into her steroid treatment so the markers are not good indicator of her disease process, prior to today's visit symptoms were going on for 3 weeks. Swelling in the knuckles and a sensation like they are tight when she makes a fist. 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 It is uncomfortable. Not keeping her from sleep, but bothersome all day. She has Crohn's disease and has had some joint pains before but never in her hands. And when she has had other flares of Crohn's that includes joints, she has had GI symptoms associated with that -she does not have any signs of a Crohn's flare otherwise.  Most of her  Crohn's symptoms are well controlled with taking supplements and following strict gluten-free and lactose-free diet   No fever, chills, rhinorrhea, body aches, urinary symptoms, sore throat or headache.   No recent travel.     I have personally reviewed the patient's allergies, medications, past medical history, family history, social history, rooming notes and problem list in detail and updated the patient record as necessary.      Past Medical History:   Diagnosis Date     Asthma      Crohn's ileitis, unspecified complication (H)      Past Surgical History:   Procedure Laterality Date     CARPAL TUNNEL RELEASE Left 2014     Augmentin [amoxicillin-pot clavulanate]; Bactrim [sulfamethoxazole-trimethoprim]; Clindamycin; Imuran [azathioprine]; Keflex [cephalexin]; Penicillins; and Sulfa (sulfonamide antibiotics)  Current Outpatient Medications   Medication Sig Dispense Refill     acetaminophen (TYLENOL) 500 MG tablet Take 1,000 mg by mouth every 8 (eight) hours.       albuterol (PROAIR HFA;PROVENTIL HFA;VENTOLIN HFA) 90 mcg/actuation inhaler Inhale 2 puffs every 4 (four) hours as needed for wheezing. 1 each 0     Boswellia chuckie extract (BOSWELLIA CHUCKIE XT, BULK, MISC)        cholecalciferol, vitamin D3, 400 unit/mL Drop drops Take 3,000 mL by mouth daily.       L.acid/L.casei/B.bif/B.tasia/FOS (PROBIOTIC BLEND ORAL)        magnesium glycinate 100 mg Tab Take 100 mg by mouth daily.       multivitamin (MULTIVITAMIN) per tablet        omega-3/dha/epa/fish oil (FISH OIL-OMEGA-3 FATTY ACIDS) 300-1,000 mg capsule        trolamine salicylate (ASPERCREME) 10 % cream        turmeric root extract 500 mg cap Take 500 mg by mouth daily.       UNABLE TO FIND        No current facility-administered medications for this visit.      Family History   Problem Relation Age of Onset     Asthma Mother      Hypertension Mother      Asthma Sister      Asthma Brother        Patient Active Problem List   Diagnosis     Nexplanon insertion  "    Bilateral carpal tunnel syndrome     S/p bilateral carpal tunnel release left side      Lactose intolerance in adult     Knee pain, bilateral     Hyperlipidemia     Crohn's disease (H)       Review of Systems   12 point comprehensive review of systems was negative except as noted and HPI     Social History     Social History Narrative     Not on file       Objective:     Vitals:    04/01/19 1314   BP: 106/76   Pulse: (!) 102   Weight: 182 lb 8 oz (82.8 kg)   Height: 5' 2.72\" (1.593 m)       Physical Exam:   Physical Exam:  General Appearance:  Appears comfortable, Alert, cooperative, no distress,   Extremities: No joint swelling, tenderness or erythema today good  range of motion intact   pulses: DP pulses are 1-2+ bilat.    Skin: no rashes or lesions  Neurologic: normal and equal strength bilat in upper and lower extremities        This note has been dictated using voice recognition software. Any grammatical or context distortions are unintentional and inherent to the software  Megan Carlson MD      "

## 2021-05-27 NOTE — PROGRESS NOTES
Assessment/Plan:         Sherri was seen today for hand pain.    Diagnoses and all orders for this visit:    Swelling of finger joint, Crohn's disease: the character of her pain and swelling is certainly inflammatory. This could be a Crohn's flare, but she has had many of these and they are usually associated with GI discomfort as well -she has none of that with this. We discussed options including having her see her PCP, going to rheumatology or initiating workup and starting steroids today and we agreed to do that last plan. I did order just basic CRP, ESR and RF. If any are positive, I think she can see rheumatology since Crohn's and other rheumatologic diseases can cluster. Discussed this would be the next step (or she may choose to see her PCP). Will use prednisone for symptomatic relief starting today - just a short burst 40mg daily x5d.  -     Sedimentation Rate  -     C-Reactive Protein(CRP)  -     Rheumatoid Factor Quant  -     predniSONE (DELTASONE) 20 MG tablet; Take 40 mg by mouth daily for 5 days.                Plan of care was discussed with the patient and/or guardian. They verbalize understanding of the treatment options and plan of care.    Kayla Aguiar       Subjective:        Sherri Lynn is a 33 y.o. female who presents for swelling in her hands. Has been going on for 2 weeks. Swelling in the knuckles and a sensation like they are tight when she makes a fist. 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 It is uncomfortable. Not keeping her from sleep, but bothersome all day. She has Crohn's disease and has had some joint pains before but never in her hands. And when she has had other flares of Crohn's that includes joints, she has had GI symptoms associated with that -she does not have any signs of a Crohn's flare otherwise.     She has no other joints impacted, just her hands.   No fever, chills, rhinorrhea, body aches, urinary symptoms, sore throat or  headache.   No recent travel.     She tried calling to get in with her PCP but they are not available for 2 more weeks so she came here.          Objective:       Blood pressure 124/80, pulse 96, temperature 99.1  F (37.3  C), temperature source Oral, resp. rate 18, weight 183 lb (83 kg), SpO2 98 %, not currently breastfeeding.   Gen: overall well appearing.   Hands: she does have swelling in all of her MCP joints but without erythema. She has mild swelling also in the PIP joints. No significant pain on palpation.

## 2021-05-27 NOTE — TELEPHONE ENCOUNTER
Called the patient back. She will repeat the lab test, even though the results may be altered by the Prednisone. She is starting to feel a little better. She has a follow up appointment. YAIMA was added to the workup.

## 2021-05-28 ASSESSMENT — ASTHMA QUESTIONNAIRES
ACT_TOTALSCORE: 8
ACT_TOTALSCORE: 6

## 2021-05-28 ASSESSMENT — ANXIETY QUESTIONNAIRES
GAD7 TOTAL SCORE: 4
GAD7 TOTAL SCORE: 7

## 2021-05-30 VITALS — WEIGHT: 161.6 LBS | BODY MASS INDEX: 28.63 KG/M2

## 2021-05-30 VITALS — BODY MASS INDEX: 28.87 KG/M2 | WEIGHT: 163 LBS

## 2021-05-30 VITALS — BODY MASS INDEX: 29.57 KG/M2 | WEIGHT: 166.9 LBS

## 2021-05-31 VITALS — WEIGHT: 163.9 LBS | BODY MASS INDEX: 29.03 KG/M2

## 2021-05-31 VITALS — WEIGHT: 170 LBS | BODY MASS INDEX: 29.64 KG/M2

## 2021-05-31 VITALS — HEIGHT: 64 IN | WEIGHT: 170.19 LBS | BODY MASS INDEX: 29.06 KG/M2

## 2021-05-31 VITALS — BODY MASS INDEX: 29.03 KG/M2 | WEIGHT: 166.5 LBS

## 2021-05-31 NOTE — TELEPHONE ENCOUNTER
Refill Request  Did you contact pharmacy: Request received from patient's pharmacy via fax.  Medication name:   Requested Prescriptions     Pending Prescriptions Disp Refills     predniSONE (DELTASONE) 10 mg tablet 45 tablet 0     Sig: Take 10 mg by mouth daily.     Who prescribed the medication: Megan Carlson MD   Pharmacy Name and Location: [facility] ([address]  Is patient out of medication: Information not provided.  Patient notified refills processed in 72 hours:  no  Okay to leave a detailed message: no

## 2021-05-31 NOTE — TELEPHONE ENCOUNTER
RN cannot approve Refill Request    RN can NOT refill this medication med is not covered by policy/route to provider     . Last office visit: 4/1/2019 Megan Carlson MD Last Physical: Visit date not found Last MTM visit: Visit date not found Last visit same specialty: 4/1/2019 Megan Carlson MD.  Next visit within 3 mo: Visit date not found  Next physical within 3 mo: Visit date not found      Kayla Do, [facility] Triage/Med Refill 8/23/2019    Requested Prescriptions   Pending Prescriptions Disp Refills     predniSONE (DELTASONE) 10 mg tablet 45 tablet 0     Sig: Take 10 mg by mouth daily.       There is no refill protocol information for this order

## 2021-06-02 VITALS — HEIGHT: 63 IN | BODY MASS INDEX: 32.34 KG/M2 | WEIGHT: 182.5 LBS

## 2021-06-02 VITALS — BODY MASS INDEX: 31.91 KG/M2 | WEIGHT: 183 LBS

## 2021-06-04 VITALS — WEIGHT: 175 LBS | BODY MASS INDEX: 31.01 KG/M2 | HEIGHT: 63 IN

## 2021-06-05 NOTE — TELEPHONE ENCOUNTER
RN cannot approve Refill Request    RN can NOT refill this medication med is not covered by policy/route to provider. Last office visit: 4/1/2019 Megan Carlson MD Last Physical: Visit date not found Last MTM visit: Visit date not found Last visit same specialty: 4/1/2019 Megan Carlson MD.  Next visit within 3 mo: Visit date not found  Next physical within 3 mo: Visit date not found      Kiley Rey, [facility] Triage/Med Refill 1/13/2020    Requested Prescriptions   Pending Prescriptions Disp Refills     predniSONE (DELTASONE) 10 mg tablet 45 tablet 0     Sig: Take 10 mg by mouth daily.       There is no refill protocol information for this order

## 2021-06-07 NOTE — PROGRESS NOTES
SUBJECTIVE: Here for curbside evaluation for COVID-19 referred through [facility]. Confirmed [facility] bad with name and date of birth for specimen collection.   Patient reports no new symptoms.     OBJECTIVE:   In no apparent distress  Eyes appear normal  Mucous membranes moist  Non diaphoretic   No increased work of breathing   Mental status appears normal/affect normal       1. Cough      Over the counter meds and isolation discussed until results in from [facility] team.  Brief education provided. Time of visit was 15 minutes more than half in coordination of care and counseling regarding COVID and self-isolation.      CODY Brooks, CNP

## 2021-06-07 NOTE — PROGRESS NOTES
"Assessment/plan   Sherri Lynn is a 34 y.o. female  who is being evaluated via a billable telephone visit.      The patient has been notified of following:     \"This telephone visit will be conducted via a call between you and your physician/provider. We have found that certain health care needs can be provided without the need for a physical exam.  This service lets us provide the care you need with a short phone conversation.  If a prescription is necessary we can send it directly to your pharmacy.  If lab work is needed we can place an order for that and you can then stop by our lab to have the test done at a later time.    If during the course of the call the physician/provider feels a telephone visit is not appropriate, you will not be charged for this service.\"     Patient has given verbal consent to a Telephone visit? Yes    Chief Complaint   Patient presents with     Concentration Issues     difficulty concentrating working for home      Asthma     Medication Management     is not available to reach rheumatology office - pt states the clinic is closed? discuss plaquenil dosage         Sherri was seen today for concentration issues, asthma and medication management.    Diagnoses and all orders for this visit:    Crohn's disease of small intestine without complication (H)  -     hydroxychloroquine (PLAQUENIL) 200 mg tablet; Take 1 tablet (200 mg total) by mouth 2 (two) times a day.    Multiple joint pain  Comments:  seronegative rheumatoid arthritis , like to increase the dose as worsening of her symptoms, not able to reach her rheumatologist .  Most likely office is closed during this pandemic I will take the liberty to increase her dose for now but advised the patient to continue to reach to her rheumatologist  Orders:  -     hydroxychloroquine (PLAQUENIL) 200 mg tablet; Take 1 tablet (200 mg total) by mouth 2 (two) times a day.    Moderate persistent asthma with exacerbation  -Advised on breathing " exercises, albuterol as needed start with Flovent 2 puffs twice a day but decreased to 1 puff twice a day then as needed after that       fluticasone propionate (FLOVENT HFA) 110 mcg/actuation inhaler; Inhale 2 puffs 2 (two) times a day.    Concentration deficit  -     AMB REFERRAL TO MENTAL HEALTH AND ADDICTION  - Adult (18+); Assessment and Testing; ADHD; [facility] 6 [phone]; We will contact you to schedule the appointment or please call with any questions; External Referral      We will follow-up after the EGD evaluation if she qualifies we will be willing to start a medication for her  Subjective:      HPI: Sherri Lynn is a 34 y.o. female who we talked over the phone over multiple concerns which patient going through recently, patient with known history of Crohn's disease and seronegative rheumatoid arthritis currently on Plaquenil 200 mg daily patient feel her symptoms are kind of aggravating with more joint pain and swelling wondering if she can increase the dose.  She had taken 400 mg of Plaquenil in the past and like to try that.    Flareup of asthma: Patient with known history of just exercise-induced asthma using albuterol only as needed but recently she started having flulike symptoms tested for COVID x2 both were negative.  She is using her albuterol more often her ACT score is only 8 today.  Requesting to be started on Flovent inhaler which she had used in the past to help those kind of symptoms  Other concern is lack of concentration which is hampering her work ability.  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  Never officially diagnosed with ADHD in the past on early childhood willing to get formal evaluation done with the psychologist  I have personally  went over  patient's allergies, medications, past medical history, family history, social history, rooming notes and problem list in detail and updated the patient record  as necessary.      Past Medical History:   Diagnosis Date     Asthma      Crohn's ileitis, unspecified complication (H)      Past Surgical History:   Procedure Laterality Date     CARPAL TUNNEL RELEASE Left 2014     Pneumococcal vaccine; Augmentin [amoxicillin-pot clavulanate]; Bactrim [sulfamethoxazole-trimethoprim]; Clindamycin; Imuran [azathioprine]; Influenza virus vaccines; Keflex [cephalexin]; Penicillins; and Sulfa (sulfonamide antibiotics)  Current Outpatient Medications   Medication Sig Dispense Refill     acetaminophen (TYLENOL) 500 MG tablet Take 1,000 mg by mouth every 8 (eight) hours.       albuterol (PROAIR HFA;PROVENTIL HFA;VENTOLIN HFA) 90 mcg/actuation inhaler Inhale 2 puffs every 4 (four) hours as needed for wheezing. 1 each 0     Boswellia chuckie extract (BOSWELLIA CHUCKIE XT, BULK, MISC)        cholecalciferol, vitamin D3, 50,000 unit Wafr Take 50,000 Units by mouth once a week.        diphenhydrAMINE (CHILDREN'S BENADRYL ALLERGY) 12.5 mg chewable tablet Chew 12.5 mg 4 (four) times a day as needed for allergies.       etonogestreL (NEXPLANON) 68 mg Impl implant 1 each by Subdermal route once. Left arm.       fluticasone propionate (FLONASE) 50 mcg/actuation nasal spray daily as needed.       hydroxychloroquine (PLAQUENIL) 200 mg tablet Take 1 tablet (200 mg total) by mouth 2 (two) times a day. 180 tablet 3     ipratropium-albuteroL (DUO-NEB) 0.5-2.5 mg/3 mL nebulizer Inhale 3 mL.       L.acid/L.casei/B.bif/B.tasia/FOS (PROBIOTIC BLEND ORAL)        trolamine salicylate (ASPERCREME) 10 % cream        turmeric root extract 500 mg cap Take 500 mg by mouth daily.       fluticasone propionate (FLOVENT HFA) 110 mcg/actuation inhaler Inhale 2 puffs 2 (two) times a day. 1 Inhaler 2     predniSONE (DELTASONE) 10 mg tablet Take 10 mg by mouth daily. 45 tablet 0     No current facility-administered medications for this visit.      Family History   Problem Relation Age of Onset     Asthma Mother       "Hypertension Mother      Asthma Sister      Asthma Brother        Patient Active Problem List   Diagnosis     Nexplanon insertion     Bilateral carpal tunnel syndrome     S/p bilateral carpal tunnel release left side      Lactose intolerance in adult     Knee pain, bilateral     Hyperlipidemia     Crohn's disease (H)       Review of Systems   12 point comprehensive review of systems was negative except as noted and HPI     Social History     Social History Narrative     Not on file       Objective:     Vitals:    04/14/20 1213   Weight: 175 lb (79.4 kg)   Height: 5' 2.75\" (1.594 m)   On exam: Does not sound in any acute distress breathing normally no shortness of breath normal speech oriented to time place and person   TT: 25 m  This note has been dictated using voice recognition software. Any grammatical or context distortions are unintentional and inherent to the software  Megan Carlson MD       "

## 2021-06-07 NOTE — TELEPHONE ENCOUNTER
Patient Returning Call  Reason for call:  Return call  Information relayed to patient:  n/a  Patient has additional questions:  Yes  If YES, what are your questions/concerns:  Patient stated that she is returning a phone call.  Okay to leave a detailed message?: Yes    [phone]

## 2021-06-07 NOTE — PROGRESS NOTES
SUBJECTIVE: Here for curbside evaluation for COVID-19 referred through [facility]. Confirmed [facility] bad with name and date of birth for specimen collection.   Patient reports no new symptoms.     OBJECTIVE:   In no apparent distress  Eyes appear normal  Mucous membranes moist  Non diaphoretic   No increased work of breathing   Mental status appears normal/affect normal       Cough  (primary encounter diagnosis)   Over the counter meds and isolation discussed until results in from [facility] team.  Brief education provided. Time of visit was 15 minutes more than half in coordination of care and counseling regarding COVID and self-isolation.

## 2021-06-08 NOTE — TELEPHONE ENCOUNTER
Last medication refill was 04/14/20 for 1 with 2 refills      Last visit with provider was 04/14/20      Will route to provider  Karen Rocha CMA

## 2021-06-09 NOTE — TELEPHONE ENCOUNTER
Who is calling:  Patient.  Reason for Call:  Canceling evisit. States she is going to a dermatologist for her cyst.    Okay to leave a detailed message: No

## 2021-06-09 NOTE — PROGRESS NOTES
Subjective:      Patient ID: Sherri Lynn is a 31 y.o. female.    Chief Complaint:    HPI  Sherri Lynn is a 31 y.o. female who presents today complaining of headache and right ear pain.  She was seen on 3/3/17 and started on Doxycycline for a sinus infection.  She has been having sinus symptoms since early February.  No fever at this point.  She is still having congestion and drainage.  She is using Flonase nasal spray twice daily.  She is also taking Ibuprofen and Sudafed three times daily.      Past Medical History:   Diagnosis Date     Asthma      Crohn's ileitis, unspecified complication        Past Surgical History:   Procedure Laterality Date     CARPAL TUNNEL RELEASE Left 2014       No family history on file.    Social History   Substance Use Topics     Smoking status: Never Smoker     Smokeless tobacco: Never Used     Alcohol use None       Review of Systems    Objective:     Visit Vitals     /70     Pulse 92     Temp 98.3  F (36.8  C) (Oral)     Resp 14     Wt 161 lb 9.6 oz (73.3 kg)     LMP 02/25/2017 (Exact Date)     SpO2 100%     Breastfeeding No     BMI 28.63 kg/m2       Physical Exam   Constitutional: She appears well-developed and well-nourished. No distress.   HENT:   Right Ear: Ear canal normal. Tympanic membrane is not erythematous, not retracted and not bulging. A middle ear effusion (clear fluid) is present.   Left Ear: Ear canal normal. Tympanic membrane is not erythematous, not retracted and not bulging. A middle ear effusion (clear fluid) is present.   Nose: Right sinus exhibits maxillary sinus tenderness. Right sinus exhibits no frontal sinus tenderness. Left sinus exhibits no maxillary sinus tenderness and no frontal sinus tenderness.   Mouth/Throat: Oropharynx is clear and moist and mucous membranes are normal.   Eyes: Conjunctivae are normal.   Neck: Neck supple.   Cardiovascular: Normal rate and regular rhythm.    No murmur heard.  Pulmonary/Chest: Effort normal and breath  sounds normal. No respiratory distress. She has no wheezes. She has no rales.   Lymphadenopathy:     She has no cervical adenopathy.     Procedures      Assessment / Plan:     1. Acute sinusitis  levoFLOXacin (LEVAQUIN) 250 MG tablet    ondansetron (ZOFRAN ODT) 4 MG disintegrating tablet   2. Serous otitis media           Patient Instructions   1) Levaquin 250 mg daily for 10 days.  2) Zofran as needed for nausea.  3) Continue Flonase and sudafed.  4) Follow up in 7-10 days if not improving, sooner if worsening or other concerns.

## 2021-06-09 NOTE — PROGRESS NOTES
SUBJECTIVE:    Sherri Lynn is a 31 y.o. female who presents today for evaluation of sinus symptoms for the past 3 weeks.  There has been sinus pressure, drainage and congestion. Symptoms seem to be worsening overall.  Fever absent. She has used OTC meds without minimal relief. She also has PND and some cough    OBJECTIVE:  HEENT: Eyes, ears, nose and throat are unremarkable.  Face: frontal sinus on the right is tender to palpation.  Neck: supple without lymphadenopathy  LUNGS: clear to auscultation, no wheezes or rales and unlabored breathing  SKIN: Warm and dry with good color.    ASSESSMENT:  Acute Sinusitis    PLAN:    Antibiotics: doxycyclin was ordered    Recommended Neti Pot/Sinucleanse with sterile water.    Pt will follow up in  7 days if not improving, and will call or return sooner if worsening.

## 2021-06-12 NOTE — PROGRESS NOTES
"Sherri Lynn is a 34 y.o. female who is being evaluated via a billable video visit.      The patient has been notified of following:     \"This video visit will be conducted via a call between you and your physician/provider. We have found that certain health care needs can be provided without the need for an in-person physical exam.  This service lets us provide the care you need with a video conversation.  If a prescription is necessary we can send it directly to your pharmacy.  If lab work is needed we can place an order for that and you can then stop by our lab to have the test done at a later time.    Video visits are billed at different rates depending on your insurance coverage. Please reach out to your insurance provider with any questions.    If during the course of the call the physician/provider feels a video visit is not appropriate, you will not be charged for this service.\"    Patient has given verbal consent to a Video visit? Yes    Patient would like to receive their AVS by AVS Preference: Mimi.    Patient would like the video invitation sent by: Text to cell phone: Will anyone else be joining your video visit? No      Video Start Time: 12:29 PM    Type of service:  Video Visit    Video End Time (time video stopped): 12:35PM  Originating Location (pt. Location): Home    Distant Location (provider location):  [facility]     Mode of Communication:  Video Conference via  [employer] marshall    Assessment/plan     Chief Complaint   Patient presents with     Medication Management     Hospital Visit Follow Up     [facility][note 3[facility] on 10/15/20 - abdominal pain         Sherri was seen today for medication management and hospital visit follow up.    Diagnoses and all orders for this visit:    AMANDA (generalized anxiety disorder)  -     buPROPion (WELLBUTRIN XL) 150 MG 24 hr tablet; Take 1 tablet (150 mg total) by mouth every morning.    Moderate persistent asthma without " complication    Moderate persistent asthma with exacerbation  -     fluticasone propionate (FLOVENT HFA) 110 mcg/actuation inhaler; TAKE 2 PUFFS BY MOUTH TWICE A DAY  -     montelukast (SINGULAIR) 10 mg tablet; Take 1 tablet (10 mg total) by mouth daily.        Commend patient make appointment in next couple weeks for her complete preventive visit and follow-up on her anxiety  Subjective:      HPI: Sherri Lynn is a 34 y.o. female who we talked  over video visit during this pandemic .  Patient basically follow-up on her recent flareup of asthma and also ER visit for abdominal pain which she believes was caused by lot of steroid use she feels her abdominal pain is much better and breathing is also improving she currently on a tapering dose of prednisone  She feels since restarted the Singulair it did help overall her allergy and asthma symptoms continue on Flovent and albuterol.  She does have appointment to talk with the pulmonologist in first week of August    She feels overall anxiety has gotten worse since she is struggled with post flulike symptoms which were causing flareup of her asthma symptoms, like to go back on her Wellbutrin which she had used in the past     PHQ-9 Total Score: 12 (10/8/2020 10:26 AM)        I have personally  went over  patient's allergies, medications, past medical history, family history, social history, rooming notes and problem list in detail and updated the patient record as necessary.      Past Medical History:   Diagnosis Date     Asthma      Crohn's ileitis, unspecified complication (H)      Past Surgical History:   Procedure Laterality Date     CARPAL TUNNEL RELEASE Left 2014     Pneumococcal vaccine, Augmentin [amoxicillin-pot clavulanate], Bactrim [sulfamethoxazole-trimethoprim], Clindamycin, Imuran [azathioprine], Influenza virus vaccines, Keflex [cephalexin], Penicillins, and Sulfa (sulfonamide antibiotics)  Current Outpatient Medications   Medication Sig Dispense Refill      albuterol (PROAIR HFA;PROVENTIL HFA;VENTOLIN HFA) 90 mcg/actuation inhaler Inhale 2 puffs every 4 (four) hours as needed for wheezing. 1 each 0     Boswellia chuckie extract (BOSWELLIA CHUCKIE XT, BULK, MISC)        diphenhydrAMINE (CHILDREN'S BENADRYL ALLERGY) 12.5 mg chewable tablet Chew 12.5 mg 4 (four) times a day as needed for allergies.       etonogestreL (NEXPLANON) 68 mg Impl implant 1 each by Subdermal route once. Left arm.       fluticasone propionate (FLONASE) 50 mcg/actuation nasal spray daily as needed.       fluticasone propionate (FLOVENT HFA) 110 mcg/actuation inhaler TAKE 2 PUFFS BY MOUTH TWICE A DAY 12 Inhaler 2     hydroxychloroquine (PLAQUENIL) 200 mg tablet Take 1 tablet (200 mg total) by mouth 2 (two) times a day. 180 tablet 3     ipratropium-albuteroL (DUO-NEB) 0.5-2.5 mg/3 mL nebulizer Inhale 3 mL.       montelukast (SINGULAIR) 10 mg tablet Take 1 tablet (10 mg total) by mouth daily. 30 tablet 2     turmeric root extract 500 mg cap Take 500 mg by mouth daily.       buPROPion (WELLBUTRIN XL) 150 MG 24 hr tablet Take 1 tablet (150 mg total) by mouth every morning. 30 tablet 2     No current facility-administered medications for this visit.      Family History   Problem Relation Age of Onset     Asthma Mother      Hypertension Mother      Asthma Sister      Asthma Brother        Patient Active Problem List   Diagnosis     Nexplanon insertion     Bilateral carpal tunnel syndrome     S/p bilateral carpal tunnel release left side      Lactose intolerance in adult     Knee pain, bilateral     Hyperlipidemia     Crohn's disease (H)     Inflammatory arthritis       Review of Systems   12 point comprehensive review of systems was negative except as noted and HPI     Social History     Social History Narrative     Not on file       Objective:   There were no vitals filed for this visit.    On Examination:  GENERAL: Healthy, alert and no distress but looks tired.  EYES: Eyes grossly normal to  inspection. No noted nasal drainage.  RESP: No audible wheeze, cough, or visible cyanosis.  No visible retractions or increased work of breathing.    NEURO: Cranial nerves grossly intact. Mentation and speech appropriate for age.  PSYCH: Mentation appears normal, affect normal/bright, judgement and insight intact, normal speech and appearance well-groomed    This note has been dictated using voice recognition software. Any grammatical or context distortions are unintentional and inherent to the software  Megna Carlson MD

## 2021-06-12 NOTE — PROGRESS NOTES
"Sherri Lynn is a 34 y.o. female who is being evaluated via a billable video visit.      The patient has been notified of following:     \"This video visit will be conducted via a call between you and your physician/provider. We have found that certain health care needs can be provided without the need for an in-person physical exam.  This service lets us provide the care you need with a video conversation.  If a prescription is necessary we can send it directly to your pharmacy.  If lab work is needed we can place an order for that and you can then stop by our lab to have the test done at a later time.    Video visits are billed at different rates depending on your insurance coverage. Please reach out to your insurance provider with any questions.    If during the course of the call the physician/provider feels a video visit is not appropriate, you will not be charged for this service.\"    Patient has given verbal consent to a Video visit? Yes    Patient would like to receive their AVS by AVS Preference: Mimi.    Patient would like the video invitation sent by: Text to cell phone: Will anyone else be joining your video visit? No      Video Start Time: 10:35 AM    Type of service:  Video Visit    Video End Time (time video stopped): 10:52 AM  Originating Location (pt. Location): Home    Distant Location (provider location):  [facility]     Mode of Communication:  Video Conference via  My chart     Assessment/plan     Chief Complaint   Patient presents with     Cough        Sherri was seen today for cough.    Diagnoses and all orders for this visit:    Moderate persistent asthma with exacerbation  -     azithromycin (ZITHROMAX) 250 MG tablet; Take 1 tablet (250 mg total) by mouth daily for 5 days. Take 500 mg (2 x 250 mg tablets) on day 1 followed by 250 mg (1 tablet) on days 2-5.  -     montelukast (SINGULAIR) 10 mg tablet; Take 1 tablet (10 mg total) by mouth daily.  -     " Ambulatory referral to Pulmonology    Crohn's disease of small intestine without complication (H)      For now we recommend patient increase her Flovent to 2 times a day and also add Flonase at bedtime.  We change her allergy medicine to the Singulair 1 tablet daily but continue using Benadryl as needed trial of azithromycin as she being coughing and wheezing for last 2 weeks and already had a 1 ED visit last week as no improvement of her symptoms with for today on prednisone patient already has 4 courses of prednisone this year  Also agreed to the plan to see a pulmonologist again to get her lung function test or she can come to our clinic after she completely recover    Subjective:      HPI: Sherri Lynn is a 34 y.o. female who we talked  over video visit during this pandemic     Asthma flare /Cough: Patient complains of fever, nasal congestion, productive cough with sputum described as white and yellow, sore throat and wheezing.  Symptoms began 2 weeks ago.  The cough is productive of clear sputum, with wheezing, with shortness of breath and  Feel Flareup of asthma: Patient with known history of just exercise-induced asthma using albuterol only as needed but recently she started having flulike symptoms tested for COVID x2 both were negative.  She is using her albuterol more often her ACT score is only 8 today.      cold air, infection, stress, pollens and reclining position Associated symptoms include:postnasal drip. Patient does not have new pets. Patient does have a history of asthma. Patient does have a history of environmental allergens. Patient does not have recent travel. Patient does not have a history of smoking. Patient  does not have previous Chest X-ray. Patient does not have had a PPD done.      I have personally  went over  patient's allergies, medications, past medical history, family history, social history, rooming notes and problem list in detail and updated the patient record as necessary.       Past Medical History:   Diagnosis Date     Asthma      Crohn's ileitis, unspecified complication (H)      Past Surgical History:   Procedure Laterality Date     CARPAL TUNNEL RELEASE Left 2014     Pneumococcal vaccine, Augmentin [amoxicillin-pot clavulanate], Bactrim [sulfamethoxazole-trimethoprim], Clindamycin, Imuran [azathioprine], Influenza virus vaccines, Keflex [cephalexin], Penicillins, and Sulfa (sulfonamide antibiotics)  Current Outpatient Medications   Medication Sig Dispense Refill     albuterol (PROAIR HFA;PROVENTIL HFA;VENTOLIN HFA) 90 mcg/actuation inhaler Inhale 2 puffs every 4 (four) hours as needed for wheezing. 1 each 0     azithromycin (ZITHROMAX) 250 MG tablet Take 1 tablet (250 mg total) by mouth daily for 5 days. Take 500 mg (2 x 250 mg tablets) on day 1 followed by 250 mg (1 tablet) on days 2-5. 6 tablet 0     Boswellia chuckie extract (BOSWELLIA CHUCKIE XT, BULK, MISC)        diphenhydrAMINE (CHILDREN'S BENADRYL ALLERGY) 12.5 mg chewable tablet Chew 12.5 mg 4 (four) times a day as needed for allergies.       etonogestreL (NEXPLANON) 68 mg Impl implant 1 each by Subdermal route once. Left arm.       FLOVENT  mcg/actuation inhaler TAKE 2 PUFFS BY MOUTH TWICE A DAY 12 Inhaler 2     fluticasone propionate (FLONASE) 50 mcg/actuation nasal spray daily as needed.       hydroxychloroquine (PLAQUENIL) 200 mg tablet Take 1 tablet (200 mg total) by mouth 2 (two) times a day. 180 tablet 3     ipratropium-albuteroL (DUO-NEB) 0.5-2.5 mg/3 mL nebulizer Inhale 3 mL.       montelukast (SINGULAIR) 10 mg tablet Take 1 tablet (10 mg total) by mouth daily. 30 tablet 2     predniSONE (DELTASONE) 20 MG tablet Take 1 tablet by mouth 2 (two) times a day.       turmeric root extract 500 mg cap Take 500 mg by mouth daily.       No current facility-administered medications for this visit.      Family History   Problem Relation Age of Onset     Asthma Mother      Hypertension Mother      Asthma Sister       Asthma Brother        Patient Active Problem List   Diagnosis     Nexplanon insertion     Bilateral carpal tunnel syndrome     S/p bilateral carpal tunnel release left side      Lactose intolerance in adult     Knee pain, bilateral     Hyperlipidemia     Crohn's disease (H)     Inflammatory arthritis       Review of Systems   12 point comprehensive review of systems was negative except as noted and HPI     Social History     Social History Narrative     Not on file       Objective:   There were no vitals filed for this visit.    On Examination:  GENERAL: Healthy, alert and no distress but looks tired.  Sounds congested  EYES: Eyes grossly normal to inspection. No noted nasal drainage.  RESP: No audible wheeze, cough, or visible cyanosis.  No visible retractions or increased work of breathing.    NEURO: Cranial nerves grossly intact. Mentation and speech appropriate for age.  PSYCH: Mentation appears normal, affect normal/bright, judgement and insight intact, normal speech and appearance well-groomed    This note has been dictated using voice recognition software. Any grammatical or context distortions are unintentional and inherent to the software  Megan Carlson MD

## 2021-06-13 NOTE — PROGRESS NOTES
ASSESSMENT/PLAN:  Routine general medical examination at a health care facility  We discussed healthy lifestyle, nutrition, cardiovascular risk reduction, self care, safety, sunscreen, and seatbelt use.  Health maintenance screening and immunizations reviewed with the patient.    Cervical cancer screening  Pap smear attempted but not completed due to patient's discomfort    Screening cholesterol level  -     Lipid Cascade    Diabetes mellitus screening  -     Glucose    Vitamin D deficiency  -     Vitamin D, Total (25-Hydroxy)    History of asthma  Stable    History of Crohn's disease  Stable     H/O: depression  Was on wellbutrin  Stable    Contraception/dysmenorrhea  We had an open discussion about various forms of contraceptives.  She opted to try nuvaring (extended use).  She also expressed interest in Nexplanon as the alternative option.  -     etonogestrel-ethinyl estradiol (NUVARING) 0.12-0.015 mg/24 hr vaginal ring; Insert 1 each into the vagina every 21 days. Insert one (1) ring vaginally and leave in place for three (3) weeks  Dispense: 3 each; Refill: 3    Need for immunization against influenza  -     Influenza, Seasonal,Quad Inj, 36+ MOS        CHIEF COMPLAINT:  Chief Complaint   Patient presents with     Annual Exam     Px, Pt is fasting. Discuss BC     Flu Vaccine       SUBJECTIVE:  Sherri Lynn is a 31 y.o. female who is here for a health maintenance visit. Her blood pressure and pulse are within normal limits. Her BMI is 29. She is fasting today. She is due for a PAP smear today. No history of abnormal PAP smear. Immunizations are up to date. She would like a flu shot today.     Dysmenorrhea: This has been progressively getting worse for her in the past 2 years. She has symptoms that will last her about 1.5 weeks including cramping, back pain and nausea. She has started to get headaches before her periods. She has regular periods. She has tried OCP's and the patch for birth control. She would  "prefer not to have a period with a form of birth control. She may be interested in the Nexplanon or Depo-provera.     REVIEW OF SYSTEMS:   History of depression. She used to take Wellbutrin, but now tries to stress manage. Crohn's is stable, and she follows with a gastroenterologist. Hearing and vision are stable. She does wear corrective lenses. Asthma is stable, and she has not needed her inhaler in years. No dysphagia or odynophagia. No problems with her breasts. Notes some occasional reflux. She takes Tums as needed, and watches what she eats. No nausea or vomiting. Denies vaginal symptoms. All other systems are negative.    PFSH:  No new history.     History   Smoking Status     Never Smoker   Smokeless Tobacco     Never Used         Past Surgical History:   Procedure Laterality Date     CARPAL TUNNEL RELEASE Left 2014       Allergies   Allergen Reactions     Augmentin [Amoxicillin-Pot Clavulanate]      Azithromycin Nausea And Vomiting     Bactrim [Sulfamethoxazole-Trimethoprim]      Clindamycin      Imuran [Azathioprine]      Pancreatitis       Keflex [Cephalexin]      Rash           OBJECTIVE:   Physical Exam   Vitals:    10/17/17 0939   BP: 104/72   Pulse: 80   Weight: 170 lb 3 oz (77.2 kg)   Height: 5' 3.5\" (1.613 m)     Estimated body mass index is 29.67 kg/(m^2) as calculated from the following:    Height as of this encounter: 5' 3.5\" (1.613 m).    Weight as of this encounter: 170 lb 3 oz (77.2 kg).    Constitutional: Patient is oriented to person, place, and time. Patient appears well-developed and well-nourished. No distress.   Head: Normocephalic and atraumatic.   Right Ear: External ear normal. Ear canal and TM normal.   Left Ear: External ear normal. Ear canal and TM normal.   Nose: Nose normal.   Mouth/Throat: Oropharynx is clear and moist. No oropharyngeal exudate.   Eyes: Conjunctivae and EOM are normal. Pupils are equal, round, and reactive to light. Right eye exhibits no discharge. Left eye " exhibits no discharge. No scleral icterus.   Neck: Neck supple. No JVD present. No tracheal deviation present. No thyromegaly present.   Breasts:  Normal appearing, no skin involvement, no palpable mass, no tenderness on palpation.  No axillary involvement  Cardiovascular: Normal rate, regular rhythm, normal heart sounds and intact distal pulses. No murmur heard.   Pulmonary/Chest: Effort normal and breath sounds normal. No stridor. No respiratory distress. Patient has no wheezes, no rales, exhibits no tenderness.   Abdominal: Soft. Bowel sounds are normal. Patient exhibits no distension and no mass. There is no tenderness. There is no rebound and no guarding.   Lymphadenopathy:  Patient has no cervical adenopathy.   Neurological: Patient is alert and oriented to person, place, and time. Patient has normal reflexes. No cranial nerve deficit. Coordination normal.   Skin: Skin is warm and dry. No rash noted. Patient is not diaphoretic. No erythema. No pallor.   Pelvic:  Normal external genitalia with Normal vulva.   No adnexal masses.   Psychiatric: Patient has good eye contact without any psychomotor retardation or stereotypic behaviors.  normal mood and affect. Judgment and thought content normal.   Speech is regular rate and rhythm.       QUALITY MEASURES:  The following high BMI interventions were performed this visit: encouragement to exercise and lifestyle education regarding diet    ADDITIONAL HISTORY SUMMARIZED (2): None.  DECISION TO OBTAIN EXTRA INFORMATION (1): None.   RADIOLOGY TESTS (1): None.  LABS (1): Ordered labs today.  MEDICINE TESTS (1): None.  INDEPENDENT REVIEW (2 each): None.     The visit lasted a total of 32 minutes face to face with the patient. Over 50% of the time was spent counseling and educating the patient about health maintenance and contraception options.    Reyna MURCIA, am scribing for and in the presence of, Dr. Hatch.    Suzi MURCIA MD ,  personally performed the services described in this documentation, as scribed by Reyna Aranda in my presence, and it is both accurate and complete.      MEDICATIONS:  Current Outpatient Prescriptions   Medication Sig Dispense Refill     acetaminophen (TYLENOL) 500 MG tablet Take 1,000 mg by mouth every 8 (eight) hours.       albuterol (PROAIR HFA;PROVENTIL HFA;VENTOLIN HFA) 90 mcg/actuation inhaler Inhale 2 puffs every 4 (four) hours as needed for wheezing. 1 each 0     fluticasone (FLONASE) 50 mcg/actuation nasal spray 1 spray into each nostril daily. 16 g 2     VEDOLIZUMAB (ENTYVIO IV) Infuse into a venous catheter.       etonogestrel-ethinyl estradiol (NUVARING) 0.12-0.015 mg/24 hr vaginal ring Insert 1 each into the vagina every 21 days. Insert one (1) ring vaginally and leave in place for three (3) weeks 3 each 3     No current facility-administered medications for this visit.          Total data points: 1

## 2021-06-13 NOTE — TELEPHONE ENCOUNTER
Refill Approved    Rx renewed per Medication Renewal Policy. Medication was last renewed on 10/22/20, last OV 10/22/20.    Miranda Steve, [facility] Triage/Med Refill 11/21/2020     Requested Prescriptions   Pending Prescriptions Disp Refills     buPROPion (WELLBUTRIN XL) 150 MG 24 hr tablet [Pharmacy Med Name: BUPROPION HCL  MG TABLET] 30 tablet 2     Sig: TAKE 1 TABLET BY MOUTH EVERY DAY IN THE MORNING       Tricyclics/Misc Antidepressant/Antianxiety Meds Refill Protocol Passed - 11/20/2020  8:30 AM        Passed - PCP or prescribing provider visit in last year     Last office visit with prescriber/PCP: 4/1/2019 Megan Carlson MD OR same dept: Visit date not found OR same specialty: 4/1/2019 Megan Carlson MD  Last physical: Visit date not found Last MTM visit: Visit date not found   Next visit within 3 mo: Visit date not found  Next physical within 3 mo: Visit date not found  Prescriber OR PCP: Megan Carlson MD  Last diagnosis associated with med order: 1. AMANDA (generalized anxiety disorder)  - buPROPion (WELLBUTRIN XL) 150 MG 24 hr tablet [Pharmacy Med Name: BUPROPION HCL  MG TABLET]; TAKE 1 TABLET BY MOUTH EVERY DAY IN THE MORNING  Dispense: 30 tablet; Refill: 2    If protocol passes may refill for 12 months if within 3 months of last provider visit (or a total of 15 months).

## 2021-06-13 NOTE — PROGRESS NOTES
Assessment:     1. Acute sinusitis  azithromycin (ZITHROMAX) 250 MG tablet   2. Wheezing  albuterol (ACCUNEB) 1.25 mg/3 mL nebulizer solution          Plan:     Symptoms consistent with acute sinusitis.  Treat with Zithromax.  Continue Dongola pot and Mucinex.  Prescription given for albuterol nebs as well as she has run out of these at home.  Follow up if symptoms are getting worse or not improving.    Subjective:       31 y.o. female presents for evaluation of a 2 week history of nasal congestion progressively worsening pain across her forehead.  She has a history of sinus infections in the past.  She has tried a Sofya pot as well as Mucinex and her symptoms have not gotten better.  She has been having a lot of headaches, primarily over her frontal sinuses.  She has been having body aches and chills but has not taken her temperature.  She also has a history of asthma and over the past week has developed a tight cough.  She has been short of breath somewhat.  She has been taking her albuterol inhaler more so at home which has been helpful.  She continues to feel very rundown however and is concerned about a sinus infection.  She has numerous other allergies in the past Zithromax has worked well for her acute sinusitis.    The following portions of the patient's history were reviewed and updated as appropriate: allergies, current medications, past family history, past medical history, past social history, past surgical history and problem list.    Review of Systems  A 12 point comprehensive review of systems was negative except as noted.     Objective:        Vitals:    11/06/17 1730   BP: 122/64   Pulse: 76   Resp: 18   Temp: 98.7  F (37.1  C)   SpO2: 98%     General Appearance:    Alert, really ill-appearing, pleasant, cooperative, no distress, appears stated age   Head:    Normocephalic, without obvious abnormality, atraumatic   Eyes:    Conjunctiva/corneas clear   Ears:    Normal TM's without erythema or  bulging. Janelle external ear canals, both ears   Nose:  Until sinus tenderness noted bilaterally.  No maxillary sinus tenderness noted.   Throat:   Lips, mucosa, and tongue normal; teeth and gums normal.  No tonsilar hypertrophy or exudate.   Neck:    Cardiovascular:   Supple, symmetrical, trachea midline, no adenopathy;     thyroid:  no enlargement/tenderness/nodules  Regular rate and rhythm, no murmurs, rubs, or gallops.   Lungs:     Clear to auscultation bilaterally without wheezes, rales, or rhonchi, respirations unlabored                          This note has been dictated using voice recognition software. Any grammatical or context distortions are unintentional and inherent to the software

## 2021-06-14 NOTE — PROGRESS NOTES
Assessment:     Sherri Lynn is a 31 y.o. female who is New to my practice here with   Chief Complaint   Patient presents with     Sinus Problem     x1 month, cough w/clear sputum, ABX not helping      Contraception     wants nexplanon         Problem List Items Addressed This Visit        ENT/CARD/PULM/ENDO Problems    Reactive airway disease - Primary    Relevant Medications    predniSONE (DELTASONE) 20 MG tablet    fluticasone-salmeterol (ADVAIR HFA) 230-21 mcg/actuation inhaler    albuterol (PROAIR HFA;PROVENTIL HFA;VENTOLIN HFA) 90 mcg/actuation inhaler       Other    History of Crohn's disease    History of asthma    Relevant Medications    albuterol (PROAIR HFA;PROVENTIL HFA;VENTOLIN HFA) 90 mcg/actuation inhaler    Nexplanon insertion    Relevant Medications    etonogestrel 68 mg implant 1 each (NEXPLANON) (Start on 11/21/2017  2:00 PM)      Other Visit Diagnoses     Persistent cough for 3 weeks or longer              Plan:    Discussed diagnosis and treatment of URI.  Discussed the importance of avoiding unnecessary antibiotic therapy.  Suggested symptomatic OTC remedies.  Nasal saline spray for congestion.  Nasal steroids per orders.  Follow up as needed.  Call in 2 days if symptoms aren't resolving.        Nexplanon insertion    Procedure Details   Urine pregnancy test was not done as currently not sexually active .  The risks (including infection, bleeding, pain, ) and benefits of the procedure were explained to the patient and Written informed consent was obtained.      Skin was cleansed with Betadine.  Nexplanon inserted per package instruction no complications . Dressed with sterile strips  Patient tolerated procedure well.        Subjective:       Sherri Lynn is a 31 y.o. female who presents for evaluation of symptoms of a URI. Symptoms include bilateral ear pressure/pain, achiness, congestion, coryza, cough described as nonproductive, nocturnal and paroxysmal, non productive cough and post  nasal drip. Onset of symptoms was 3 week ago, and has been unchanged since that time. Treatment to date: antibiotics.  Already treated with antibiotics 2 wks ago with no improvement was treated  on 11/6/17 .does have history of Reactive airways during URI's , had used albuterol and advair in the past       Nexplanon: Patient like to have Implant done today . Reason to prevent menstrual migraine headaches , currently using Nuva ring . Not sexually active     The following portions of the patient's history were reviewed and updated as appropriate: allergies, current medications, past family history, past medical history, past social history, past surgical history and problem list.    Review of Systems    REVIEW OF SYSTEMS:   Constitutional: Negative for fever.  HENT: Negative for congestion and rhinorrhea. Negative for ear discharge.   Eyes: Negative for discharge and redness.   Respiratory: Negative for cough, wheezing, or shortness of breath.  Gastrointestinal: Negative. Negative for vomiting and anorexia.   Genitourinary: Negative.   Skin: Negative. Negative for rash.       Objective:     Allergies   Allergen Reactions     Augmentin [Amoxicillin-Pot Clavulanate]      Bactrim [Sulfamethoxazole-Trimethoprim]      Clindamycin      Imuran [Azathioprine]      Pancreatitis       Keflex [Cephalexin]      Rash         Active Ambulatory Problems     Diagnosis Date Noted     History of Crohn's disease 05/01/2017     History of asthma 05/01/2017     Reactive airway disease 11/21/2017     Nexplanon insertion 11/21/2017     Resolved Ambulatory Problems     Diagnosis Date Noted     No Resolved Ambulatory Problems     Past Medical History:   Diagnosis Date     Asthma      Crohn's ileitis, unspecified complication        Current Outpatient Prescriptions on File Prior to Visit   Medication Sig Dispense Refill     acetaminophen (TYLENOL) 500 MG tablet Take 1,000 mg by mouth every 8 (eight) hours.       albuterol (ACCUNEB) 1.25 mg/3  mL nebulizer solution Take 3 mL (1.25 mg total) by nebulization every 6 (six) hours as needed for wheezing. 25 vial 2     fluticasone (FLONASE) 50 mcg/actuation nasal spray 1 spray into each nostril daily. 16 g 2     [DISCONTINUED] albuterol (PROAIR HFA;PROVENTIL HFA;VENTOLIN HFA) 90 mcg/actuation inhaler Inhale 2 puffs every 4 (four) hours as needed for wheezing. 1 each 0     [DISCONTINUED] etonogestrel-ethinyl estradiol (NUVARING) 0.12-0.015 mg/24 hr vaginal ring Insert 1 each into the vagina every 21 days. Insert one (1) ring vaginally and leave in place for three (3) weeks 3 each 3     [DISCONTINUED] azithromycin (ZITHROMAX) 250 MG tablet Take 2 tabs today, then 1 daily for 4 more daily 6 tablet 0     [DISCONTINUED] VEDOLIZUMAB (ENTYVIO IV) Infuse into a venous catheter.       No current facility-administered medications on file prior to visit.          VITALS:  Vitals:    11/21/17 0925   BP: 108/70   Pulse: 92   Temp: 98.9  F (37.2  C)   Weight: 166 lb 8 oz (75.5 kg)     Wt Readings from Last 3 Encounters:   11/21/17 166 lb 8 oz (75.5 kg)   11/06/17 170 lb (77.1 kg)   10/17/17 170 lb 3 oz (77.2 kg)       PHYSICAL EXAM:  Constitutional: Vital signs are normal. He is active.   HENT: Atraumatic.  Head: Normocephalic.   Right Ear: Tympanic membrane and canal normal.   Left Ear: Tympanic membrane and canal normal.   Nose:  turbinates swollen   Mouth/Throat: No pharynx erythema. No tonsillar exudate. Oropharynx is clear.   Eyes: Conjunctivae and lids are normal.   Cardiovascular: Normal rate and regular rhythm. No murmur heard.  Pulmonary/Chest: Effort normal and breath sounds normal. There is normal air entry without occasional  wheezes no  rhonchi.  Abdominal: Soft, nontender, nondistended. No mass.  Neurological: Alert.  Skin: No rash noted.     MEDICATIONS:    Current Outpatient Prescriptions   Medication Sig Dispense Refill     acetaminophen (TYLENOL) 500 MG tablet Take 1,000 mg by mouth every 8 (eight) hours.        albuterol (ACCUNEB) 1.25 mg/3 mL nebulizer solution Take 3 mL (1.25 mg total) by nebulization every 6 (six) hours as needed for wheezing. 25 vial 2     albuterol (PROAIR HFA;PROVENTIL HFA;VENTOLIN HFA) 90 mcg/actuation inhaler Inhale 2 puffs every 4 (four) hours as needed for wheezing. 1 each 0     fluticasone (FLONASE) 50 mcg/actuation nasal spray 1 spray into each nostril daily. 16 g 2     fluticasone-salmeterol (ADVAIR HFA) 230-21 mcg/actuation inhaler Inhale 2 puffs 2 (two) times a day. 1 Inhaler 12     predniSONE (DELTASONE) 20 MG tablet Take 1 tablet (20 mg total) by mouth 2 (two) times a day for 5 days. 10 tablet 0     Current Facility-Administered Medications   Medication Dose Route Frequency Provider Last Rate Last Dose     etonogestrel 68 mg implant 1 each (NEXPLANON)  1 each Subdermal Once MD Megan Bethea

## 2021-06-20 NOTE — LETTER
Letter by Megan Carlson MD at      Author: Megan Carlson MD Service: -- Author Type: --    Filed:  Encounter Date: 10/8/2020 Status: (Other)         October 8, 2020     Patient: Sherri Lynn   YOB: 1985   Date of Visit: 10/8/2020       To Whom It May Concern:    It is my medical opinion that Sherri Lynn may return to work on 10/9/20 .treated for asthma flare during our visit .    If you have any questions or concerns, please don't hesitate to call.    Sincerely,        Electronically signed by Megan Carlson MD

## 2021-06-21 NOTE — LETTER
Letter by Megan Carlson MD at      Author: Megan Carlson MD Service: -- Author Type: --    Filed:  Encounter Date: 10/19/2020 Status: (Other)         October 19, 2020     Patient: Sherri Lynn   YOB: 1985   Date of Visit: 10/19/2020       To Whom It May Concern:    It is my medical opinion that Sherri Lynn may return to work on 10/26/20 as still going through shortness of breath and fatigue.    If you have any questions or concerns, please don't hesitate to call.    Sincerely,        Electronically signed by Megan Carlson MD        stated

## 2021-06-25 NOTE — PROGRESS NOTES
Progress Notes by Александр Rodriguez DO at 5/1/2017  3:00 PM     Author: Александр Rodriguez DO Service: -- Author Type: Physician    Filed: 5/1/2017  3:54 PM Encounter Date: 5/1/2017 Status: Signed    : Александр Rodriguez DO (Physician)       Chief Complaint   Patient presents with   ? Cough     chest congestion, 1x week ago        History of Present Illness: Nursing notes reviewed. Patient states her current illness started with a sore throat and sinus drainage, with the cough starting yesterday. No recent fevers or chills. She had some nausea and vomiting once with use of azithromycin, but she has tolerated it with other treatments. She has enough albuterol to use she states.    Review of systems: See history of present illness, otherwise negative.     Current Outpatient Prescriptions   Medication Sig Dispense Refill   ? fluticasone (FLONASE) 50 mcg/actuation nasal spray 1 spray into each nostril daily. 16 g 2   ? VEDOLIZUMAB (ENTYVIO IV) Infuse into a venous catheter.     ? albuterol (PROAIR HFA;PROVENTIL HFA;VENTOLIN HFA) 90 mcg/actuation inhaler Inhale 2 puffs every 4 (four) hours as needed for wheezing. 1 each 0   ? azithromycin (ZITHROMAX Z-ALFIE) 250 MG tablet Take 2 tablets (500 mg) on  Day 1,  followed by 1 tablet (250 mg) once daily on Days 2 through 5. 6 tablet 0     No current facility-administered medications for this visit.        Past Medical History:   Diagnosis Date   ? Asthma    ? Crohn's ileitis, unspecified complication       Past Surgical History:   Procedure Laterality Date   ? CARPAL TUNNEL RELEASE Left 2014      Social History     Social History   ? Marital status: Single     Spouse name: N/A   ? Number of children: N/A   ? Years of education: N/A     Social History Main Topics   ? Smoking status: Never Smoker   ? Smokeless tobacco: Never Used   ? Alcohol use Not on file   ? Drug use: Not on file   ? Sexual activity: Not on file     Other Topics Concern   ? Not on file     Social History  Narrative       History   Smoking Status   ? Never Smoker   Smokeless Tobacco   ? Never Used      Exam:   Blood pressure 114/68, pulse 97, temperature 98.7  F (37.1  C), temperature source Oral, resp. rate 16, weight 166 lb 14.4 oz (75.7 kg), last menstrual period 04/17/2017, SpO2 99 %, not currently breastfeeding.    EXAM:   General: Vital signs reviewed. Patient is in no acute appearing distress with no coughing at exam. Breathing is non labored appearing. Patient is alert and oriented x 3.   ENT: Ear exam shows clear TMs with no injection, nasal turbinates show no edema or injection, but she is tender over the maxillary sinuses, no pharyngeal injection with increased post nasal drainage noted.  Neck: supple with no adenopathy  Heart: Normal rate and rhythm without murmur  Lungs: Clear to auscultation with good air flow bilaterally.  Skin: warm and dry    Assessment/Plan   1. History of asthma  albuterol (PROAIR HFA;PROVENTIL HFA;VENTOLIN HFA) 90 mcg/actuation inhaler   2. History of Crohn's disease  albuterol (PROAIR HFA;PROVENTIL HFA;VENTOLIN HFA) 90 mcg/actuation inhaler   3. Sinusitis  azithromycin (ZITHROMAX Z-ALFIE) 250 MG tablet       Patient Instructions     Also see info below. I think your cough is mostly from sinus drainage. Be seen again in 3-4 days if symptoms are not better, sooner if feeling any worse.    Causes of Sinusitis           Mucus helps keep your sinuses clean. But mucus may build up in the sinuses due to colds, allergies, or obstructions. These things interfere with the natural drainage of mucus. This may lead to sinusitis (sinus inflammation and infection).    Acute sinusitis comes on suddenly. It often happens right after an upper respiratory infection, such as a cold.    Chronic sinusitis is ongoing swelling of the sinus lining. This is often the result of allergies or chronic infections.  Colds and other infections  A cold or flu may cause your sinus and nasal linings to swell. Sinus  openings can become blocked. This causes mucus to back up. This backed-up mucus becomes an ideal place for bacteria to grow. Thick, yellow, or discolored mucus is one sign of infection.  Allergic reactions  You may be sensitive to certain substances. This causes the release of histamine in the body. Histamine makes your sinus and nasal linings swell. Long-term swelling clogs your sinuses. It prevents the cilia (tiny hairs in the nasal lining) from sweeping away mucus. Allergy symptoms can be persistent. But theyre less severe than with colds.  Obstructions    A polyp is a sac of swollen tissue. It can be the result of an allergy or infection. It may block the middle meatus (the opening where most of your sinuses drain). It may even grow large enough to block your nose.    A deviated septum is when the thin wall inside your nose is pushed to one side. It is often the result of injury. This can block your middle meatus.    3609-4917 [employer]. [address] [place] [address], [place] [address]. All rights reserved. This information is not intended as a substitute for professional medical care. Always follow your healthcare professional's instructions.           Александр Rodriguez, DO

## 2021-06-25 NOTE — PROGRESS NOTES
Progress Notes by Александр Rodriguez DO at 7/7/2017  3:20 PM     Author: Александр Rodriguez DO Service: -- Author Type: Physician    Filed: 7/8/2017  9:32 PM Encounter Date: 7/7/2017 Status: Signed    : Александр Rodriguez DO (Physician)       Chief Complaint   Patient presents with   ? Migraine     x 3 days        History of Present Illness: Nursing notes reviewed. Patient has not been getting relief of headache using OTC ibuprofen and extra strength Tylenol. Patient has had waxing/waning pain in mid forehead, and right temple. Other then her headache discomfort, she has not had any focal neurological problems. She denies having any nasal congestion. No recent migraine symptoms such as nausea or visual disturbances.    Review of systems: See history of present illness, otherwise negative.     Current Outpatient Prescriptions   Medication Sig Dispense Refill   ? acetaminophen (TYLENOL) 500 MG tablet Take 1,000 mg by mouth every 8 (eight) hours.     ? albuterol (PROAIR HFA;PROVENTIL HFA;VENTOLIN HFA) 90 mcg/actuation inhaler Inhale 2 puffs every 4 (four) hours as needed for wheezing. 1 each 0   ? fluticasone (FLONASE) 50 mcg/actuation nasal spray 1 spray into each nostril daily. 16 g 2   ? VEDOLIZUMAB (ENTYVIO IV) Infuse into a venous catheter.       No current facility-administered medications for this visit.        Past Medical History:   Diagnosis Date   ? Asthma    ? Crohn's ileitis, unspecified complication       Past Surgical History:   Procedure Laterality Date   ? CARPAL TUNNEL RELEASE Left 2014      Social History     Social History   ? Marital status: Single     Spouse name: N/A   ? Number of children: N/A   ? Years of education: N/A     Social History Main Topics   ? Smoking status: Never Smoker   ? Smokeless tobacco: Never Used   ? Alcohol use None   ? Drug use: None   ? Sexual activity: Not Asked     Other Topics Concern   ? None     Social History Narrative       History   Smoking Status   ? Never Smoker    Smokeless Tobacco   ? Never Used      Exam:   Blood pressure 110/60, pulse 60, temperature 98.8  F (37.1  C), resp. rate 14, weight 163 lb 14.4 oz (74.3 kg), SpO2 100 %, not currently breastfeeding.    EXAM:   General: Vital signs reviewed. Patient is in some distress due to headache discomfort. She has a normal pleasant affect. Speech is clear and normally fluent. Breathing is non labored appearing. Patient is alert and oriented x 3.   ENT: Tympanic membranes are clear and without injection bilaterally, nasal turbinates show no injection or rhinorrhea, no pharyngeal injection or exudate.  Eyes: PERRL with normal consensual gaze and convergence with accomodation.  Neck: supple with no adenopathy.  Heart: Normal rate and rhythm without murmur  Lungs: Clear to auscultation with good air flow bilaterally.  Skin: warm and dry  Patient got good relief of headache discomfort with treatment given in the clinic.    Assessment/Plan   1. Cluster headache  SUMAtriptan injection 6 mg (IMITREX)       Patient Instructions   Also see info below. Be seen again if not continued improvement.    Migraines and Cluster Headaches  Migraines and cluster headaches cause intense, throbbing pain on one side of the head. With a migraine, you may have nausea and vomiting and be sensitive to light and sound. You may also have warning signs, such as flashing lights or loss of parts of your vision, before the pain starts. Migraines are three times more common in women than men. This may be due to hormonal changes during menstruation. Typical migrains may last for 4 to 72 hours untreated.  Cluster headaches recur in groups for days, weeks, or months. The pain is centered around or behind one eye. The eye may also become red or teary, or the eyelid may droop. Migraines and cluster headaches can have many triggers.    Preventing migraines and cluster headaches  Try the following steps:    Avoid aged cheeses, nuts, beans, chocolate, red wine, or  foods that contain caffeine, alcohol, tobacco, nitrates, and MSG.    Try not to skip meals.    Dont work in poor lighting.    Reduce stress as much as you can.    Get plenty of sleep each night.    Exercise regularly under your doctors guidance.    Avoid taking headache medicines for more than 3 days, because of the risk of rebound headaches.  Relieving the pain  Try these suggestions:    Stay quiet and rest.    Use cold to numb the pain. Wrap ice or a cold can of soda in a cloth. Hold it against the site of pain for 10 minutes. Repeat every 20 minutes.    Avoid light. Wear dark glasses, turn out lights, and close the curtains. When outdoors, wear a brimmed hat.    Drink lots of fluids. Sip caffeine-free flat soda to help relieve nausea.    See your doctor if you get migraines or cluster headaches often. There are effective medications to help treat or prevent them.    Hormone therapy. This may help women whose migraines are related to hormonal changes during menstruation.  Date Last Reviewed: 10/19/2015    7261-9378 [employer]. [address] [place] [address], [place] [address]. All rights reserved. This information is not intended as a substitute for professional medical care. Always follow your healthcare professional's instructions.           Александр Rodriguez,

## 2021-07-03 NOTE — ADDENDUM NOTE
Addendum Note by Megan Carlson MD at 4/22/2020 11:44 AM     Author: Megan Carlson MD Service: -- Author Type: Physician    Filed: 4/22/2020 11:44 AM Encounter Date: 4/22/2020 Status: Signed    : Megan Carlson MD (Physician)    Addended by: MEGAN CARLSON on: 4/22/2020 11:44 AM        Modules accepted: Orders

## 2021-07-03 NOTE — ADDENDUM NOTE
Addendum Note by Park Castro MLT at 3/24/2019  2:40 PM     Author: Park Castro MLT Service: -- Author Type:     Filed: 3/25/2019  6:31 PM Encounter Date: 3/24/2019 Status: Signed    : Park Castro MLT ()    Addended by: PARK CASTRO on: 3/25/2019 06:31 PM        Modules accepted: Orders

## 2021-09-05 ENCOUNTER — HEALTH MAINTENANCE LETTER (OUTPATIENT)
Age: 36
End: 2021-09-05

## 2021-11-18 ENCOUNTER — E-VISIT (OUTPATIENT)
Dept: FAMILY MEDICINE | Facility: CLINIC | Age: 36
End: 2021-11-18
Payer: COMMERCIAL

## 2021-11-18 DIAGNOSIS — Z88.7 ALLERGY TO INFLUENZA VACCINE: Primary | ICD-10-CM

## 2021-11-18 PROBLEM — Z30.017 NEXPLANON INSERTION: Status: ACTIVE | Noted: 2017-11-21

## 2021-11-18 PROCEDURE — 99422 OL DIG E/M SVC 11-20 MIN: CPT | Performed by: FAMILY MEDICINE

## 2021-11-18 ASSESSMENT — ASTHMA QUESTIONNAIRES
QUESTION_4 LAST FOUR WEEKS HOW OFTEN HAVE YOU USED YOUR RESCUE INHALER OR NEBULIZER MEDICATION (SUCH AS ALBUTEROL): NOT AT ALL
QUESTION_3 LAST FOUR WEEKS HOW OFTEN DID YOUR ASTHMA SYMPTOMS (WHEEZING, COUGHING, SHORTNESS OF BREATH, CHEST TIGHTNESS OR PAIN) WAKE YOU UP AT NIGHT OR EARLIER THAN USUAL IN THE MORNING: NOT AT ALL
QUESTION_2 LAST FOUR WEEKS HOW OFTEN HAVE YOU HAD SHORTNESS OF BREATH: NOT AT ALL
QUESTION_1 LAST FOUR WEEKS HOW MUCH OF THE TIME DID YOUR ASTHMA KEEP YOU FROM GETTING AS MUCH DONE AT WORK, SCHOOL OR AT HOME: NONE OF THE TIME
ACT_TOTALSCORE: 25
QUESTION_5 LAST FOUR WEEKS HOW WOULD YOU RATE YOUR ASTHMA CONTROL: COMPLETELY CONTROLLED

## 2021-11-18 NOTE — LETTER
[facility]  [address] [facility] [id number] [phone]          November 18, 2021    RE:  Sherri Lynn                                                                                                                                                       [address]  [place] [address]            To whom it may concern:    Sherri Lynn is under my professional care and had reported allergy to flu vaccine, for that reason she will not be taking flu vaccine in the future , patient will apply all preventive measures at work.    Sincerely,        Megan Carlson MD

## 2021-11-18 NOTE — LETTER
My Asthma Action Plan    Name: Sherri Lynn   YOB: 1985  Date: 11/18/2021   My doctor: Megan Carlson MD   My clinic: [facility]        My Rescue Medicine:   Albuterol inhaler (Proair/Ventolin/Proventil HFA)  2-4 puffs EVERY 4 HOURS as needed. Use a spacer if recommended by your provider.   My Asthma Severity:   Intermittent / Exercise Induced  Know your asthma triggers: upper respiratory infections, pollens, mold, emotions and cold air             GREEN ZONE   Good Control    I feel good    No cough or wheeze    Can work, sleep and play without asthma symptoms       Take your asthma control medicine every day.     1. If exercise triggers your asthma, take your rescue medication    15 minutes before exercise or sports, and    During exercise if you have asthma symptoms  2. Spacer to use with inhaler: If you have a spacer, make sure to use it with your inhaler             YELLOW ZONE Getting Worse  I have ANY of these:    I do not feel good    Cough or wheeze    Chest feels tight    Wake up at night   1. Keep taking your Green Zone medications  2. Start taking your rescue medicine:    every 20 minutes for up to 1 hour. Then every 4 hours for 24-48 hours.  3. If you stay in the Yellow Zone for more than 12-24 hours, contact your doctor.  4. If you do not return to the Green Zone in 12-24 hours or you get worse, start taking your oral steroid medicine if prescribed by your provider.           RED ZONE Medical Alert - Get Help  I have ANY of these:    I feel awful    Medicine is not helping    Breathing getting harder    Trouble walking or talking    Nose opens wide to breathe       1. Take your rescue medicine NOW  2. If your provider has prescribed an oral steroid medicine, start taking it NOW  3. Call your doctor NOW  4. If you are still in the Red Zone after 20 minutes and you have not reached your doctor:    Take your rescue medicine again and    Call 911 or go to  the emergency room right away    See your regular doctor within 2 weeks of an Emergency Room or Urgent Care visit for follow-up treatment.          Annual Reminders:  Meet with Asthma Educator,  Flu Shot in the Fall, consider Pneumonia Vaccination for patients with asthma (aged 19 and older).    Pharmacy: [id number] - [place], [place] - [address] AT [facility]    Electronically signed by Megan Carlson MD   Date: 11/18/21                    Asthma Triggers  How To Control Things That Make Your Asthma Worse    Triggers are things that make your asthma worse.  Look at the list below to help you find your triggers and   what you can do about them. You can help prevent asthma flare-ups by staying away from your triggers.      Trigger                                                          What you can do   Cigarette Smoke  Tobacco smoke can make asthma worse. Do not allow smoking in your home, car or around you.  Be sure no one smokes at a child s day care or school.  If you smoke, ask your health care provider for ways to help you quit.  Ask family members to quit too.  Ask your health care provider for a referral to Quit Plan to help you quit smoking, or call 9-116-061-PLAN.     Colds, Flu, Bronchitis  These are common triggers of asthma. Wash your hands often.  Don t touch your eyes, nose or mouth.  Get a flu shot every year.     Dust Mites  These are tiny bugs that live in cloth or carpet. They are too small to see. Wash sheets and blankets in hot water every week.   Encase pillows and mattress in dust mite proof covers.  Avoid having carpet if you can. If you have carpet, vacuum weekly.   Use a dust mask and HEPA vacuum.   Pollen and Outdoor Mold  Some people are allergic to trees, grass, or weed pollen, or molds. Try to keep your windows closed.  Limit time out doors when pollen count is high.   Ask you health care provider about taking medicine during allergy season.     Animal  Dander  Some people are allergic to skin flakes, urine or saliva from pets with fur or feathers. Keep pets with fur or feathers out of your home.    If you can t keep the pet outdoors, then keep the pet out of your bedroom.  Keep the bedroom door closed.  Keep pets off cloth furniture and away from stuffed toys.     Mice, Rats, and Cockroaches  Some people are allergic to the waste from these pests.   Cover food and garbage.  Clean up spills and food crumbs.  Store grease in the refrigerator.   Keep food out of the bedroom.   Indoor Mold  This can be a trigger if your home has high moisture. Fix leaking faucets, pipes, or other sources of water.   Clean moldy surfaces.  Dehumidify basement if it is damp and smelly.   Smoke, Strong Odors, and Sprays  These can reduce air quality. Stay away from strong odors and sprays, such as perfume, powder, hair spray, paints, smoke incense, paint, cleaning products, candles and new carpet.   Exercise or Sports  Some people with asthma have this trigger. Be active!  Ask your doctor about taking medicine before sports or exercise to prevent symptoms.    Warm up for 5-10 minutes before and after sports or exercise.     Other Triggers of Asthma  Cold air:  Cover your nose and mouth with a scarf.  Sometimes laughing or crying can be a trigger.  Some medicines and food can trigger asthma.        Solaraze Counseling:  I discussed with the patient the risks of Solaraze including but not limited to erythema, scaling, itching, weeping, crusting, and pain.

## 2021-11-18 NOTE — LETTER
My Depression Action Plan  Name: Sherri Lynn   Date of Birth 1985  Date: 11/18/2021    My doctor: Megan Carlson   My clinic: [address] [address] [phone]          GREEN    ZONE   Good Control    What it looks like:     Things are going generally well. You have normal ups and downs. You may even feel depressed from time to time, but bad moods usually last less than a day.   What you need to do:  1. Continue to care for yourself (see self care plan)  2. Check your depression survival kit and update it as needed  3. Follow your physician s recommendations including any medication.  4. Do not stop taking medication unless you consult with your physician first.           YELLOW         ZONE Getting Worse    What it looks like:     Depression is starting to interfere with your life.     It may be hard to get out of bed; you may be starting to isolate yourself from others.    Symptoms of depression are starting to last most all day and this has happened for several days.     You may have suicidal thoughts but they are not constant.   What you need to do:     1. Call your care team. Your response to treatment will improve if you keep your care team informed of your progress. Yellow periods are signs an adjustment may need to be made.     2. Continue your self-care.  Just get dressed and ready for the day.  Don't give yourself time to talk yourself out of it.    3. Talk to someone in your support network.    4. Open up your Depression Self-Care Plan/Wellness Kit.           RED    ZONE Medical Alert - Get Help    What it looks like:     Depression is seriously interfering with your life.     You may experience these or other symptoms: You can t get out of bed most days, can t work or engage in other necessary activities, you have trouble taking care of basic hygiene, or basic responsibilities, thoughts of suicide or death that will  not go away, self-injurious behavior.     What you need to do:  1. Call your care team and request a same-day appointment. If they are not available (weekends or after hours) call your local crisis line, emergency room or 911.          Depression Self-Care Plan / Wellness Kit    Many people find that medication and therapy are helpful treatments for managing depression. In addition, making small changes to your everyday life can help to boost your mood and improve your wellbeing. Below are some tips for you to consider. Be sure to talk with your medical provider and/or behavioral health consultant if your symptoms are worsening or not improving.     Sleep   Sleep hygiene  means all of the habits that support good, restful sleep. It includes maintaining a consistent bedtime and wake time, using your bedroom only for sleeping or sex, and keeping the bedroom dark and free of distractions like a computer, smartphone, or television.     Develop a Healthy Routine  Maintain good hygiene. Get out of bed in the morning, make your bed, brush your teeth, take a shower, and get dressed. Don t spend too much time viewing media that makes you feel stressed. Find time to relax each day.    Exercise  Get some form of exercise every day. This will help reduce pain and release endorphins, the  feel good  chemicals in your brain. It can be as simple as just going for a walk or doing some gardening, anything that will get you moving.      Diet  Strive to eat healthy foods, including fruits and vegetables. Drink plenty of water. Avoid excessive sugar, caffeine, alcohol, and other mood-altering substances.     Stay Connected with Others  Stay in touch with friends and family members.    Manage Your Mood  Try deep breathing, massage therapy, biofeedback, or meditation. Take part in fun activities when you can. Try to find something to smile about each day.     Psychotherapy  Be open to working with a therapist if your provider recommends  it.     Medication  Be sure to take your medication as prescribed. Most anti-depressants need to be taken every day. It usually takes several weeks for medications to work. Not all medicines work for all people. It is important to follow-up with your provider to make sure you have a treatment plan that is working for you. Do not stop your medication abruptly without first discussing it with your provider.    Crisis Resources   These hotlines are for both adults and children. They and are open 24 hours a day, 7 days a week unless noted otherwise.      National Suicide Prevention Lifeline   0-174-414-TALK (0864)      Crisis Text Line    www.crisistextline.org  Text HOME to 595591 from anywhere in the [place], anytime, about any type of crisis. A live, trained crisis counselor will receive the text and respond quickly.      [place] Lifeline for LGBTQ Youth  A national crisis intervention and suicide lifeline for LGBTQ youth under 25. Provides a safe place to talk without judgement. Call 1-608.791.2158; text START to 489812 or visit www.thetrevorproject.org to talk to a trained counselor.      For Critical access hospital crisis numbers, visit the [facility] website at:  https://[place].gov/dhs/people-we-serve/adults/health-care/mental-health/resources/crisis-contacts.jsp

## 2021-11-18 NOTE — PATIENT INSTRUCTIONS
Letter is created for you that you have allergy to flu vaccine   As well as asthma and depression action plan  Please make marshall for yearly preventive visit whenever you have time   Have a wonderful holidays :)  Megan Carlson MD 11/18/2021 9:44 AM   [facility].  [phone]

## 2021-11-19 ASSESSMENT — ASTHMA QUESTIONNAIRES: ACT_TOTALSCORE: 25

## 2021-11-24 ENCOUNTER — TELEPHONE (OUTPATIENT)
Dept: FAMILY MEDICINE | Facility: CLINIC | Age: 36
End: 2021-11-24
Payer: COMMERCIAL

## 2021-11-24 NOTE — TELEPHONE ENCOUNTER
Incoming exemption form to be signed by PCP for flu shot. Form did not have a return fax. Called and left a message on patient's cell number. Tried another number provided on the incoming fax and spoke with patient. She preferred we mail it. Confirmed patient's address.     Original mailed to patient and a copy sent to scans.

## 2021-12-09 ENCOUNTER — MYC MEDICAL ADVICE (OUTPATIENT)
Dept: FAMILY MEDICINE | Facility: CLINIC | Age: 36
End: 2021-12-09

## 2021-12-09 DIAGNOSIS — R76.11 MANTOUX: POSITIVE: Primary | ICD-10-CM

## 2021-12-22 ENCOUNTER — LAB (OUTPATIENT)
Dept: LAB | Facility: CLINIC | Age: 36
End: 2021-12-22

## 2021-12-22 DIAGNOSIS — R76.11 MANTOUX: POSITIVE: ICD-10-CM

## 2021-12-22 PROCEDURE — 36415 COLL VENOUS BLD VENIPUNCTURE: CPT

## 2021-12-22 PROCEDURE — 86481 TB AG RESPONSE T-CELL SUSP: CPT

## 2021-12-23 LAB
QUANTIFERON MITOGEN: 9.23 IU/ML
QUANTIFERON NIL TUBE: 0 IU/ML
QUANTIFERON TB1 TUBE: 0 IU/ML
QUANTIFERON TB2 TUBE: 0.03

## 2021-12-24 LAB
GAMMA INTERFERON BACKGROUND BLD IA-ACNC: 0 IU/ML
M TB IFN-G BLD-IMP: NEGATIVE
M TB IFN-G CD4+ BCKGRND COR BLD-ACNC: 9.23 IU/ML
MITOGEN IGNF BCKGRD COR BLD-ACNC: 0 IU/ML
MITOGEN IGNF BCKGRD COR BLD-ACNC: 0.03 IU/ML

## 2022-02-28 ENCOUNTER — E-VISIT (OUTPATIENT)
Dept: FAMILY MEDICINE | Facility: CLINIC | Age: 37
End: 2022-02-28
Payer: COMMERCIAL

## 2022-02-28 DIAGNOSIS — G89.4 CHRONIC PAIN SYNDROME: ICD-10-CM

## 2022-02-28 DIAGNOSIS — F32.2 SEVERE DEPRESSION (H): Primary | ICD-10-CM

## 2022-02-28 DIAGNOSIS — F41.1 GAD (GENERALIZED ANXIETY DISORDER): ICD-10-CM

## 2022-02-28 PROCEDURE — 99422 OL DIG E/M SVC 11-20 MIN: CPT | Performed by: FAMILY MEDICINE

## 2022-02-28 ASSESSMENT — PATIENT HEALTH QUESTIONNAIRE - PHQ9
SUM OF ALL RESPONSES TO PHQ QUESTIONS 1-9: 27
10. IF YOU CHECKED OFF ANY PROBLEMS, HOW DIFFICULT HAVE THESE PROBLEMS MADE IT FOR YOU TO DO YOUR WORK, TAKE CARE OF THINGS AT HOME, OR GET ALONG WITH OTHER PEOPLE: EXTREMELY DIFFICULT
SUM OF ALL RESPONSES TO PHQ QUESTIONS 1-9: 27

## 2022-02-28 ASSESSMENT — ANXIETY QUESTIONNAIRES
GAD7 TOTAL SCORE: 19
GAD7 TOTAL SCORE: 19
2. NOT BEING ABLE TO STOP OR CONTROL WORRYING: NEARLY EVERY DAY
7. FEELING AFRAID AS IF SOMETHING AWFUL MIGHT HAPPEN: NEARLY EVERY DAY
4. TROUBLE RELAXING: NEARLY EVERY DAY
1. FEELING NERVOUS, ANXIOUS, OR ON EDGE: NEARLY EVERY DAY
3. WORRYING TOO MUCH ABOUT DIFFERENT THINGS: NEARLY EVERY DAY
6. BECOMING EASILY ANNOYED OR IRRITABLE: NEARLY EVERY DAY
7. FEELING AFRAID AS IF SOMETHING AWFUL MIGHT HAPPEN: NEARLY EVERY DAY
8. IF YOU CHECKED OFF ANY PROBLEMS, HOW DIFFICULT HAVE THESE MADE IT FOR YOU TO DO YOUR WORK, TAKE CARE OF THINGS AT HOME, OR GET ALONG WITH OTHER PEOPLE?: EXTREMELY DIFFICULT
GAD7 TOTAL SCORE: 19
5. BEING SO RESTLESS THAT IT IS HARD TO SIT STILL: SEVERAL DAYS

## 2022-03-01 RX ORDER — DULOXETIN HYDROCHLORIDE 20 MG/1
20 CAPSULE, DELAYED RELEASE ORAL 2 TIMES DAILY
Qty: 60 CAPSULE | Refills: 1 | Status: SHIPPED | OUTPATIENT
Start: 2022-03-01 | End: 2022-04-25

## 2022-03-01 ASSESSMENT — ANXIETY QUESTIONNAIRES: GAD7 TOTAL SCORE: 19

## 2022-03-01 ASSESSMENT — PATIENT HEALTH QUESTIONNAIRE - PHQ9: SUM OF ALL RESPONSES TO PHQ QUESTIONS 1-9: 27

## 2022-03-01 NOTE — ADDENDUM NOTE
Addended by: MILLI SMITH on: 3/1/2022 08:59 AM     Modules accepted: Orders, Level of Service, SmartSet

## 2022-03-01 NOTE — TELEPHONE ENCOUNTER
Provider E-Visit time total (minutes): <11-20     Advice to make clinic or video visit, but meanwhile will start her on cymbalta and continue therapy    Megan Carlson MD 3/1/2022 7:38 AM   [facility].  [phone]

## 2022-03-01 NOTE — PATIENT INSTRUCTIONS
"    Warning Signs of Suicide and What You Can Do  If you think a person could be suicidal, ask, \"Have you thought about suicide?\" Asking won't make it more likely that they will try to hurt themselves. In fact, most people with suicidal thoughts say they are relieved when the question is asked.   If they say yes, they may already have a plan for how and when they will attempt it. Find out as much as you can. The more detailed the plan, and the easier it is to carry out, the more danger the person is in right now.     Know the warning signs  The warning signs for suicide include:    Threats or talk of suicide    Talking about death and dying    Changing eating or sleeping habits (for instance, not sleeping or sleeping all of the time)    Feeling hopeless    Suddenly buying a gun or other weapon    Saying things such as \"Soon, I won't be a problem\" or \"Nothing matters\"    Giving away things they own, making out a will, or planning their     Suddenly being happy or calm after being depressed  Things that put a person at a higher risk of attempting suicide include:     A history of suicide in the person's family    Past suicide attempts    Alcohol and drug use, along with impulsive behaviors    Having a diagnosed mood disorder such as depression or bipolar disorder    History of trauma or abuse including bullying    Major losses such as a divorce, death of a loved one, money problems, or legal problems    Having access to a lethal weapon (such as guns in the home)    Long-term (chronic) physical illnesses, including chronic pain    Being around others with suicidal behavior  Get help  Don't try to handle this alone. You can be the most help by getting the person to a trained professional. Suicidal thinking may be a sign of depression. This is a serious but treatable illness.   In an emergency--call 911  Don't leave the person alone. Anyone who is at imminent risk of suicide needs psychiatric services right away. " The person must be constantly watched, and never left out of sight. Call 911 or a 24-hour suicide crisis hotline. You can search for this online. You can also take the person to the nearest hospital emergency room.   Don't keep it a secret and don't wait  Call a mental health clinic or a licensed mental health professional in your area right away. This may be a psychiatrist, clinical psychologist, psychiatric or licensed clinical , marriage and family counselor, or clergy. If you don't know how to contact such professionals and there is an immediate risk, call 911. Tell them you need help for a person who is thinking about suicide.   Resources    National Suicide Prevention Xdshhopb857-519-9386 (870-498-BTQF)www.suicidepreventionlifeline.org    National Suicide Uhmleiq998-775-0545 (800-SUICIDE)    National [place] of Mental Wqmhly962-880-2705nvh.[facility].nih.gov    National [place] on Mental Kczfycu334-439-2656wkk.kiya.org    Mental Health Lgmsbsb053-428-6710ysk.[facility].org    Jairo last reviewed this educational content on 1/1/2020 2000-2021 [employer]. All rights reserved. This information is not intended as a substitute for professional medical care. Always follow your healthcare professional's instructions.          Using Antidepressants  Depression is a mood disorder that affects the way you think and feel. The most common symptom is a feeling of deep sadness. This feeling doesn't go away or get better on its own. But most types of depression can be helped with therapy and antidepressant medicines. (Note: This covers antidepressant use in adults only.)     What do antidepressants do?  Antidepressants restore the balance of certain chemicals in your brain to help ease your depression. You will likely feel better in 4 to 6 weeks. But you may continue taking antidepressants for a year or more to keep your symptoms from coming back. Some people with depression need to take  antidepressants for life. There are several types of antidepressants. The main types are described below.   Selective serotonin reuptake inhibitors (SSRIs)  SSRIs are effective medicines for the treatment of depression. They tend to have fewer side effects than other antidepressants. Possible side effects include anxiety, trouble sleeping, nausea, diarrhea, sexual dysfunction, and headaches. In rare cases, they may make you more depressed. SSRIs shouldn t be mixed with certain other medicines. Talk with your healthcare provider about all the medicines, herbs, and supplements you are taking.   Tricyclic antidepressants  Tricyclics help severe or long-term depression. They have been used for many years with good results. Possible side effects include blurred vision, dry mouth, and constipation.   Monoamine oxidase inhibitors (MAOIs)  If you don t respond to tricyclics or SSRIs, your healthcare provider may prescribe MAOIs. These medicines can be very effective. But people taking MAOIs must stay away from certain foods and medicines. Your healthcare provider can tell you more.   Lithium  If you have bipolar disorder, you may take a medicine called lithium. This medicine helps even out your mood. Possible side effects are weight gain, trembling, loose stool, and nausea. Lithium is also used:     For people who have unipolar depression and have not responded to other antidepressants    For people who have a sudden (acute) episode of unipolar depression    As a maintenance medicine to prevent unipolar depression from happening again  Things to stay away from if you are taking MAOIs   If you are taking MAOIs, don t have any of the following:     Beans    Aged cheese    Chocolate    Red wine    Most cold medicines    Certain medicines (ask your healthcare provider)  To reduce the risk of lithium poisoning  You can reduce the risk of lithium poisoning by following this advice:    Take only the prescribed amount of lithium.  If your depressive symptoms get worse, contact your healthcare provider. Never increase or decrease your medicine on your own.    Drink plenty of fluids other than coffee, tea, and soda.    Limit salt in your diet.    Before using other prescribed medicines or over-the-counter medicines, check with your pharmacist. This is to be sure the medicines won t interact with the lithium.    Never share your medicines or use another person's medicines, even if it is the same medicine and dose.    Keep follow-up appointments.    Have your lithium blood level checked as advised. You will need blood work more often when symptoms are not under control.  If you have side effects  The side effects of antidepressants are usually mild. But if you have troubling side effects, call your healthcare provider. Changing the dosage or type of medicine may help. Never stop taking medicines on your own.   Jairo last reviewed this educational content on 9/1/2019 2000-2021 [employer]. All rights reserved. This information is not intended as a substitute for professional medical care. Always follow your healthcare professional's instructions.

## 2022-03-24 ENCOUNTER — E-VISIT (OUTPATIENT)
Dept: FAMILY MEDICINE | Facility: CLINIC | Age: 37
End: 2022-03-24
Payer: COMMERCIAL

## 2022-03-24 ENCOUNTER — LAB (OUTPATIENT)
Dept: FAMILY MEDICINE | Facility: CLINIC | Age: 37
End: 2022-03-24
Attending: FAMILY MEDICINE
Payer: COMMERCIAL

## 2022-03-24 DIAGNOSIS — J06.9 VIRAL URI: ICD-10-CM

## 2022-03-24 DIAGNOSIS — J01.00 ACUTE NON-RECURRENT MAXILLARY SINUSITIS: ICD-10-CM

## 2022-03-24 DIAGNOSIS — J02.9 SORE THROAT: Primary | ICD-10-CM

## 2022-03-24 LAB
DEPRECATED S PYO AG THROAT QL EIA: NEGATIVE
FLUAV AG SPEC QL IA: NEGATIVE
FLUBV AG SPEC QL IA: NEGATIVE
GROUP A STREP BY PCR: NOT DETECTED

## 2022-03-24 PROCEDURE — 99421 OL DIG E/M SVC 5-10 MIN: CPT | Performed by: FAMILY MEDICINE

## 2022-03-24 PROCEDURE — 87651 STREP A DNA AMP PROBE: CPT | Performed by: FAMILY MEDICINE

## 2022-03-24 PROCEDURE — 87804 INFLUENZA ASSAY W/OPTIC: CPT

## 2022-03-24 NOTE — LETTER
[facility]  [address] [facility] [id number] [phone]          March 25, 2022    RE:  Sherri Lynn                                                                                                                                                       [address]  [place] [address]            To whom it may concern:    Sherri BUTTERFIELD Shane is under my professional care for    Sore throat  Acute non-recurrent maxillary sinusitis  Viral URI She  may return to work  3/28 with no restrictions    Sincerely,        Megan Carlson MD

## 2022-03-24 NOTE — PATIENT INSTRUCTIONS
Viral Upper Respiratory Illness (Adult)    You have a viral upper respiratory illness (URI), which is another term for the common cold. This illness is contagious during the first few days. It is spread through the air by coughing and sneezing. It may also be spread by direct contact (touching the sick person and then touching your own eyes, nose, or mouth). Frequent handwashing will decrease risk of spread. Most viral illnesses go away within 7 to 10 days with rest and simple home remedies. Sometimes the illness may last for several weeks. Antibiotics will not kill a virus, and they are generally not prescribed for this condition.  Home care    If symptoms are severe, rest at home for the first 2 to 3 days. When you resume activity, don't let yourself get too tired.    Don't smoke. If you need help stopping, talk with your healthcare provider.    Avoid being exposed to cigarette smoke (yours or others ).    You may use acetaminophen or ibuprofen to control pain and fever, unless another medicine was prescribed. If you have chronic liver or kidney disease, have ever had a stomach ulcer or gastrointestinal bleeding, or are taking blood-thinning medicines, talk with your healthcare provider before using these medicines. Aspirin should never be given to anyone under 18 years of age who is ill with a viral infection or fever. It may cause severe liver or brain damage.    Your appetite may be poor, so a light diet is fine. Stay well hydrated by drinking 6 to 8 glasses of fluids per day (water, soft drinks, juices, tea, or soup). Extra fluids will help loosen secretions in the nose and lungs.    Over-the-counter cold medicines will not shorten the length of time you re sick, but they may be helpful for the following symptoms: cough, sore throat, and nasal and sinus congestion. If you take prescription medicines, ask your healthcare provider or pharmacist which over-the-counter medicines are safe to use. (Note: Don't  use decongestants if you have high blood pressure.)  Follow-up care  Follow up with your healthcare provider, or as advised.  When to seek medical advice  Call your healthcare provider right away if any of these occur:    Cough with lots of colored sputum (mucus)    Severe headache; face, neck, or ear pain    Difficulty swallowing due to throat pain    Fever of 100.4 F (38 C) or higher, or as directed by your healthcare provider  Call 911  Call 911 if any of these occur:    Chest pain, shortness of breath, wheezing, or difficulty breathing    Coughing up blood    Very severe pain with swallowing, especially if it goes along with a muffled voice   [employer] last reviewed this educational content on 6/1/2018 2000-2021 [employer]. All rights reserved. This information is not intended as a substitute for professional medical care. Always follow your healthcare professional's instructions.        Dear Sherri Lynn    After reviewing your responses, I've been able to diagnose you with?a sinus infection caused by a virus.?     Based on your responses and diagnosis, I have not  prescribed anything  to treat your symptoms. Will wait on lab result before sending any prescription.  It is also important to stay well hydrated, get lots of rest and take over-the-counter decongestants,?tylenol?or ibuprofen if you?are able to?take those medications per your primary care provider to help relieve discomfort.?     It is important that you take?all of?your prescribed medication even if your symptoms are improving after a few doses.? Taking?all of?your medicine helps prevent the symptoms from returning.?   Will order both strep and flu test for you please make lab only appointment   Will watch for your result  If your symptoms worsen, you develop severe headache, vomiting, high fever (>102), or are not improving in 7 days, please contact your primary care provider for an appointment or visit any of our convenient  Walk-in Care or Urgent Care Centers to be seen which can be found on our website?here.?     Thanks again for choosing?us?as your health care partner,?   ?  Megan Carlson MD?

## 2022-03-27 ENCOUNTER — E-VISIT (OUTPATIENT)
Dept: URGENT CARE | Facility: CLINIC | Age: 37
End: 2022-03-27
Payer: COMMERCIAL

## 2022-03-27 DIAGNOSIS — J01.90 ACUTE SINUSITIS, RECURRENCE NOT SPECIFIED, UNSPECIFIED LOCATION: Primary | ICD-10-CM

## 2022-03-27 PROCEDURE — 99421 OL DIG E/M SVC 5-10 MIN: CPT | Performed by: PHYSICIAN ASSISTANT

## 2022-03-27 RX ORDER — DOXYCYCLINE HYCLATE 100 MG
100 TABLET ORAL 2 TIMES DAILY
Qty: 14 TABLET | Refills: 0 | Status: SHIPPED | OUTPATIENT
Start: 2022-03-27 | End: 2022-04-03

## 2022-03-28 NOTE — PATIENT INSTRUCTIONS
Dear Sherri Lynn    After reviewing your responses, I've been able to diagnose you with?a sinus infection caused by bacteria.?     Based on your responses and diagnosis, I have prescribed Doxycycline to treat your symptoms. I have sent this to your pharmacy.?     It is also important to stay well hydrated, get lots of rest and take over-the-counter decongestants,?tylenol?or ibuprofen if you?are able to?take those medications per your primary care provider to help relieve discomfort.?     It is important that you take?all of?your prescribed medication even if your symptoms are improving after a few doses.? Taking?all of?your medicine helps prevent the symptoms from returning.?     If your symptoms worsen, you develop severe headache, vomiting, high fever (>102), or are not improving in 7 days, please contact your primary care provider for an appointment or visit any of our convenient Walk-in Care or Urgent Care Centers to be seen which can be found on our website?here.?     Thanks again for choosing?us?as your health care partner,?   ?  Parul Vazquez PA-C?

## 2022-03-30 ASSESSMENT — ANXIETY QUESTIONNAIRES
6. BECOMING EASILY ANNOYED OR IRRITABLE: NEARLY EVERY DAY
6. BECOMING EASILY ANNOYED OR IRRITABLE: NEARLY EVERY DAY
1. FEELING NERVOUS, ANXIOUS, OR ON EDGE: MORE THAN HALF THE DAYS
GAD7 TOTAL SCORE: 13
5. BEING SO RESTLESS THAT IT IS HARD TO SIT STILL: NOT AT ALL
3. WORRYING TOO MUCH ABOUT DIFFERENT THINGS: MORE THAN HALF THE DAYS
GAD7 TOTAL SCORE: 13
4. TROUBLE RELAXING: MORE THAN HALF THE DAYS
3. WORRYING TOO MUCH ABOUT DIFFERENT THINGS: MORE THAN HALF THE DAYS
7. FEELING AFRAID AS IF SOMETHING AWFUL MIGHT HAPPEN: MORE THAN HALF THE DAYS
2. NOT BEING ABLE TO STOP OR CONTROL WORRYING: MORE THAN HALF THE DAYS
7. FEELING AFRAID AS IF SOMETHING AWFUL MIGHT HAPPEN: MORE THAN HALF THE DAYS
GAD7 TOTAL SCORE: 13
GAD7 TOTAL SCORE: 13
7. FEELING AFRAID AS IF SOMETHING AWFUL MIGHT HAPPEN: MORE THAN HALF THE DAYS
1. FEELING NERVOUS, ANXIOUS, OR ON EDGE: MORE THAN HALF THE DAYS
4. TROUBLE RELAXING: MORE THAN HALF THE DAYS
2. NOT BEING ABLE TO STOP OR CONTROL WORRYING: MORE THAN HALF THE DAYS
5. BEING SO RESTLESS THAT IT IS HARD TO SIT STILL: NOT AT ALL

## 2022-03-30 ASSESSMENT — PATIENT HEALTH QUESTIONNAIRE - PHQ9
SUM OF ALL RESPONSES TO PHQ QUESTIONS 1-9: 18
SUM OF ALL RESPONSES TO PHQ QUESTIONS 1-9: 18
10. IF YOU CHECKED OFF ANY PROBLEMS, HOW DIFFICULT HAVE THESE PROBLEMS MADE IT FOR YOU TO DO YOUR WORK, TAKE CARE OF THINGS AT HOME, OR GET ALONG WITH OTHER PEOPLE: SOMEWHAT DIFFICULT
SUM OF ALL RESPONSES TO PHQ QUESTIONS 1-9: 18

## 2022-03-31 ENCOUNTER — VIRTUAL VISIT (OUTPATIENT)
Dept: FAMILY MEDICINE | Facility: CLINIC | Age: 37
End: 2022-03-31
Payer: COMMERCIAL

## 2022-03-31 DIAGNOSIS — F41.1 GAD (GENERALIZED ANXIETY DISORDER): ICD-10-CM

## 2022-03-31 DIAGNOSIS — F32.1 CURRENT MODERATE EPISODE OF MAJOR DEPRESSIVE DISORDER WITHOUT PRIOR EPISODE (H): Primary | ICD-10-CM

## 2022-03-31 DIAGNOSIS — J01.01 ACUTE RECURRENT MAXILLARY SINUSITIS: ICD-10-CM

## 2022-03-31 DIAGNOSIS — K50.919 CROHN'S DISEASE WITH COMPLICATION, UNSPECIFIED GASTROINTESTINAL TRACT LOCATION (H): ICD-10-CM

## 2022-03-31 DIAGNOSIS — Z02.9 ADMINISTRATIVE ENCOUNTER: ICD-10-CM

## 2022-03-31 PROBLEM — F32.9 MAJOR DEPRESSION, SINGLE EPISODE: Status: ACTIVE | Noted: 2022-03-31

## 2022-03-31 PROBLEM — M19.90 INFLAMMATORY ARTHRITIS: Status: ACTIVE | Noted: 2022-03-31

## 2022-03-31 PROCEDURE — 99214 OFFICE O/P EST MOD 30 MIN: CPT | Mod: 95 | Performed by: FAMILY MEDICINE

## 2022-03-31 PROCEDURE — 96127 BRIEF EMOTIONAL/BEHAV ASSMT: CPT | Mod: 95 | Performed by: FAMILY MEDICINE

## 2022-03-31 ASSESSMENT — ANXIETY QUESTIONNAIRES
GAD7 TOTAL SCORE: 13
6. BECOMING EASILY ANNOYED OR IRRITABLE: NEARLY EVERY DAY
7. FEELING AFRAID AS IF SOMETHING AWFUL MIGHT HAPPEN: MORE THAN HALF THE DAYS
4. TROUBLE RELAXING: SEVERAL DAYS
5. BEING SO RESTLESS THAT IT IS HARD TO SIT STILL: NOT AT ALL
GAD7 TOTAL SCORE: 12
1. FEELING NERVOUS, ANXIOUS, OR ON EDGE: MORE THAN HALF THE DAYS
3. WORRYING TOO MUCH ABOUT DIFFERENT THINGS: MORE THAN HALF THE DAYS
2. NOT BEING ABLE TO STOP OR CONTROL WORRYING: MORE THAN HALF THE DAYS

## 2022-03-31 ASSESSMENT — PATIENT HEALTH QUESTIONNAIRE - PHQ9: SUM OF ALL RESPONSES TO PHQ QUESTIONS 1-9: 18

## 2022-03-31 NOTE — PROGRESS NOTES
Sherri is a 36 year old who is being evaluated via a billable video visit.      How would you like to obtain your AVS? MyChart  If the video visit is dropped, the invitation should be resent by: Text to cell phone: [phone]  Will anyone else be joining your video visit? No      Video Start Time: 12:20 PM    Assessment & Plan     Sherri was seen today for mental health and sinus problem.    Diagnoses and all orders for this visit:    Current moderate episode of major depressive disorder without prior episode (H)  Comments:  taking cymbalta only once daily as unable to tolerate BID dose, adv titrate up gradually , continue work with therapist if she can     AMANDA (generalized anxiety disorder)    f/u Acute recurrent maxillary sinusitis    Crohn's disease with complication, unspecified gastrointestinal tract location (H)  Comments:  stable , taking probiotic during ABX intake     Administrative encounter  Comments:  like me to fill FMLA paper work for her abscence from work for sinus infection 3/22-3/29 will be sending us the papers         [facility] [employer] and [employer]  reviewed  [id number]}     Depression Screening Follow Up: continue to struggle with stress and anxiety , just stated taking her cymbalta denies any major side effects and no worsening of her depression, denies any SI today    PHQ 3/30/2022   PHQ-9 Total Score 18   Q9: Thoughts of better off dead/self-harm past 2 weeks Several days   F/U: Thoughts of suicide or self-harm Yes   F/U: Self harm-plan No   F/U: Self-harm action No   F/U: Safety concerns No     Last PHQ-9 3/30/2022   1.  Little interest or pleasure in doing things 2   2.  Feeling down, depressed, or hopeless 2   3.  Trouble falling or staying asleep, or sleeping too much 3   4.  Feeling tired or having little energy 3   5.  Poor appetite or overeating 3   6.  Feeling bad about yourself 1   7.  Trouble concentrating 3   8.   Moving slowly or restless 0   Q9: Thoughts of better off dead/self-harm past 2 weeks 1   PHQ-9 Total Score 18   Difficulty at work, home, or with people -   In the past two weeks have you had thoughts of suicide or self harm? Yes   Do you have concerns about your personal safety or the safety of others? No   In the past 2 weeks have you thought about a plan or had intention to harm yourself? No   In the past 2 weeks have you acted on these thoughts in any way? No         No flowsheet data found.      Follow Up      Follow Up Actions Taken  Crisis resource information provided in the After Visit Summary  Patient to follow up with PCP.  Clinic staff to schedule appointment if able.  Scheduled appointment with [facility] [place]    Discussed the following ways the patient can remain in a safe environment:  remove alcohol  MEDICATIONS:  Continue current medications without change  FUTURE APPOINTMENTS:       - Follow-up visit in 2-3 wk for preventive visit   Patient Safety Assessment:    After gathering the above information, Sherri presents the following high risk factors for suicide: panic,extreme anxiety, mood disorder with psychosis/paranoia and hopelessness, worthlessness.  Sherri denies current fears or concerns for personal safety.    Sherri has the following Protective Factors: Positive social support     Upon review of the patient interview, identification of the above factors, safety strategy alternatives, and treatment plan interventions: Patient consented to co-developed safety plan which included no fire arms at home and easy access to health care .     Depression Resources Provided: Coping with Depression  Crisis Text Line  http://www.crisistextline.org     The Crisis Text Line serves anyone, in any type of crisis, providing access to free, 24/7 support and information via the medium people already use and trust:     Here's how it works:  1. Text 511-067 from anywhere in the [place], anytime, about any type of crisis.  2. A  live, trained Crisis Counselor receives the text and responds quickly.  3. The volunteer Crisis Counselor will help you move from a 'hot moment to a cool moment'          Return in about 2 weeks (around 4/14/2022) for Follow up, Routine preventive.    Megan Carlson MD  [facility] SHAHID Polanco is a 36 year old who presents for the following health issues Follow up depression and sinus infection     HPI     Depression and Anxiety Follow-Up    How are you doing with your depression since your last visit? Stable , slight improved     How are you doing with your anxiety since your last visit?  No change    Are you having other symptoms that might be associated with depression or anxiety? Yes:  tired , also had sinus infection so struggling with that     Have you had a significant life event? No     Do you have any concerns with your use of alcohol or other drugs? No    Social History     Tobacco Use     Smoking status: Never Smoker     Smokeless tobacco: Never Used   Substance Use Topics     Alcohol use: Yes     Drug use: No     PHQ 3/30/2022 3/30/2022 3/30/2022   PHQ-9 Total Score 18 18 18   Q9: Thoughts of better off dead/self-harm past 2 weeks Several days Several days Several days   F/U: Thoughts of suicide or self-harm Yes Yes Yes   F/U: Self harm-plan No No No   F/U: Self-harm action No No No   F/U: Safety concerns No No No     AMANDA-7 SCORE 3/30/2022 3/30/2022 3/30/2022   Total Score - 13 (moderate anxiety) 13 (moderate anxiety)   Total Score 13 13 13     Last PHQ-9 3/30/2022   1.  Little interest or pleasure in doing things 2   2.  Feeling down, depressed, or hopeless 2   3.  Trouble falling or staying asleep, or sleeping too much 3   4.  Feeling tired or having little energy 3   5.  Poor appetite or overeating 3   6.  Feeling bad about yourself 1   7.  Trouble concentrating 3   8.  Moving slowly or restless 0   Q9: Thoughts of better off dead/self-harm past 2 weeks 1    PHQ-9 Total Score 18   Difficulty at work, home, or with people -   In the past two weeks have you had thoughts of suicide or self harm? Yes   Do you have concerns about your personal safety or the safety of others? No   In the past 2 weeks have you thought about a plan or had intention to harm yourself? No   In the past 2 weeks have you acted on these thoughts in any way? No     AMANDA-7  3/30/2022   1. Feeling nervous, anxious, or on edge 2   2. Not being able to stop or control worrying 2   3. Worrying too much about different things 2   4. Trouble relaxing 2   5. Being so restless that it is hard to sit still 0   6. Becoming easily annoyed or irritable 3   7. Feeling afraid, as if something awful might happen 2   AMANDA-7 Total Score 13   If you checked any problems, how difficult have they made it for you to do your work, take care of things at home, or get along with other people? -      Objective    Vitals - Patient Reported  Weight (Patient Reported): 81.6 kg (180 lb)      Vitals:  No vitals were obtained today due to virtual visit.    Physical Exam   GENERAL: Healthy, alert and no distress  EYES: Eyes grossly normal to inspection.  No discharge or erythema, or obvious scleral/conjunctival abnormalities.  RESP: No audible wheeze, cough, or visible cyanosis.  No visible retractions or increased work of breathing.    SKIN: Visible skin clear. No significant rash, abnormal pigmentation or lesions.  NEURO: Cranial nerves grossly intact.  Mentation and speech appropriate for age.  PSYCH: Mentation appears normal, affect normal/bright, judgement and insight intact, normal speech and appearance well-groomed.              Video-Visit Details    Type of service:  Video Visit    Video End Time:12:38 PM    Originating Location (pt. Location): Home    Distant Location (provider location):  [facility]     Platform used for Video Visit: Cream Style    Answers for HPI/ROS submitted by the patient on  3/30/2022  If you checked off any problems, how difficult have these problems made it for you to do your work, take care of things at home, or get along with other people?: Somewhat difficult  PHQ9 TOTAL SCORE: 18  AMANDA 7 TOTAL SCORE: 13  Depression/Anxiety: Depression & Anxiety  Status since last visit:: medium  Anxiety since last: : medium  Other associated symptoms of depression:: No  Other associated symotome: : No  Significant life event: : job concerns  Anxious:: Yes  Current substance use:: No  How many servings of fruits and vegetables do you eat daily?: 4 or more  On average, how many sweetened beverages do you drink each day (Examples: soda, juice, sweet tea, etc.  Do NOT count diet or artificially sweetened beverages)?: 0  How many minutes a day do you exercise enough to make your heart beat faster?: 30 to 60  How many days a week do you exercise enough to make your heart beat faster?: 5  How many days per week do you miss taking your medication?: 0

## 2022-04-11 ENCOUNTER — MYC MEDICAL ADVICE (OUTPATIENT)
Dept: FAMILY MEDICINE | Facility: CLINIC | Age: 37
End: 2022-04-11
Payer: COMMERCIAL

## 2022-04-11 NOTE — LETTER
April 12, 2022     RE:  Sherri Lynn                                                                                                                                                       [address]   [place] [address]                 To whom it may concern:     Sherri Lynn is under my professional care for    Sore throat  Acute non-recurrent maxillary sinusitis  Viral URI   She may return to work  3/29 with no restrictions       Sincerely,           Megan Carlson MD

## 2022-04-12 NOTE — TELEPHONE ENCOUNTER
Form printed and placed on Dr. Carlson's desk.    Letter pended, please review and sign if appropriate.

## 2022-04-17 ENCOUNTER — HEALTH MAINTENANCE LETTER (OUTPATIENT)
Age: 37
End: 2022-04-17

## 2022-04-25 DIAGNOSIS — F32.2 SEVERE DEPRESSION (H): ICD-10-CM

## 2022-04-25 DIAGNOSIS — G89.4 CHRONIC PAIN SYNDROME: ICD-10-CM

## 2022-04-25 DIAGNOSIS — F41.1 GAD (GENERALIZED ANXIETY DISORDER): ICD-10-CM

## 2022-04-25 NOTE — TELEPHONE ENCOUNTER
Refill request for medication: Duloxetine 20 mg   Last visit addressing this medication: 3/31/22  Last refill: 3/31/22 #60  With 1 refill

## 2022-04-27 RX ORDER — DULOXETIN HYDROCHLORIDE 20 MG/1
20 CAPSULE, DELAYED RELEASE ORAL 2 TIMES DAILY
Qty: 180 CAPSULE | Refills: 0 | Status: SHIPPED | OUTPATIENT
Start: 2022-04-27 | End: 2022-06-01

## 2022-04-30 ENCOUNTER — OFFICE VISIT (OUTPATIENT)
Dept: URGENT CARE | Facility: URGENT CARE | Age: 37
End: 2022-04-30
Payer: COMMERCIAL

## 2022-04-30 VITALS
HEART RATE: 102 BPM | TEMPERATURE: 98.2 F | OXYGEN SATURATION: 98 % | SYSTOLIC BLOOD PRESSURE: 127 MMHG | DIASTOLIC BLOOD PRESSURE: 85 MMHG

## 2022-04-30 DIAGNOSIS — L02.412 ABSCESS OF LEFT AXILLA: Primary | ICD-10-CM

## 2022-04-30 PROCEDURE — 10060 I&D ABSCESS SIMPLE/SINGLE: CPT | Performed by: PHYSICIAN ASSISTANT

## 2022-04-30 PROCEDURE — 99213 OFFICE O/P EST LOW 20 MIN: CPT | Mod: 25 | Performed by: PHYSICIAN ASSISTANT

## 2022-04-30 RX ORDER — DOXYCYCLINE 100 MG/1
100 CAPSULE ORAL 2 TIMES DAILY
Qty: 20 CAPSULE | Refills: 0 | Status: SHIPPED | OUTPATIENT
Start: 2022-04-30 | End: 2022-05-10

## 2022-04-30 NOTE — PROGRESS NOTES
SUBJECTIVE:   Patiest  presents for lesion on the left axilla which is  now noted to have increased redness and swelling and some redness spreading around it for the last 3 days. Hx of recurrent issues and usually able to get to resolve with warm packs and topical med but this is getting to painful and needs to have lanced.  Hx of MRSA she states.  Can only take Doxy medication   No fevers noted.  No other sx noted    Past Medical History:   Diagnosis Date     Asthma      Crohn's ileitis, unspecified complication (H)      Patient Active Problem List   Diagnosis     CARDIOVASCULAR SCREENING; LDL GOAL LESS THAN 160     Crohn's disease (H)     Carpal tunnel syndrome     Intermittent asthma     Nexplanon insertion     Lactose intolerance in adult     Inflammatory arthritis     Major depression, single episode     Current Outpatient Medications   Medication     doxycycline hyclate (VIBRAMYCIN) 100 MG capsule     DULoxetine (CYMBALTA) 20 MG capsule     etonogestrel (NEXPLANON) 68 MG IMPL     Turmeric Curcumin 500 MG CAPS     No current facility-administered medications for this visit.     Social History     Socioeconomic History     Marital status: Single     Spouse name: Not on file     Number of children: Not on file     Years of education: Not on file     Highest education level: Not on file   Occupational History     Not on file   Tobacco Use     Smoking status: Never Smoker     Smokeless tobacco: Never Used   Substance and Sexual Activity     Alcohol use: Yes     Drug use: No     Sexual activity: Never   Other Topics Concern     Not on file   Social History Narrative     Not on file     Social Determinants of Health     Financial Resource Strain: Not on file   Food Insecurity: Not on file   Transportation Needs: Not on file   Physical Activity: Not on file   Stress: Not on file   Social Connections: Not on file   Intimate Partner Violence: Not on file   Housing Stability: Not on file     ROS  Negative other than  stated above       OBJECTIVE:   Patient appears well. Vitals are normal. An obvious abscess is noted on the left axilla region  approximately 2cm diameter. THere is moderate  redness around it and very tender to touch.  Area is fluctuant     ASSESSMENT:   Cutaneous abscess     PLAN:   The lesion was sterilized with betadine iand injected with three 2 cc Lidocain with Epi, opened with a #11 blade, pus exuded.  Doxy as directed and signs of spreading infection discussed. To Follow-up with PCP as needed if sx worsen or new sx develop.  OTC med as needed for pain .

## 2022-05-02 ENCOUNTER — E-VISIT (OUTPATIENT)
Dept: FAMILY MEDICINE | Facility: CLINIC | Age: 37
End: 2022-05-02
Payer: COMMERCIAL

## 2022-05-02 DIAGNOSIS — L03.119 CELLULITIS OF AXILLA, UNSPECIFIED LATERALITY: Primary | ICD-10-CM

## 2022-05-02 PROCEDURE — 99421 OL DIG E/M SVC 5-10 MIN: CPT | Performed by: FAMILY MEDICINE

## 2022-05-03 NOTE — PATIENT INSTRUCTIONS
I have looked at the photo you uploaded , I am not sure how it looked before the antibiotics, but my advice to continue antibiotics and take ibuprofen for pain, warm/cold compresses are fine for comfort, if looks like soft -pus building up and not draining it self make appointment in the clinic and we can drain it    Megan Carlson MD

## 2022-05-11 ENCOUNTER — MYC MEDICAL ADVICE (OUTPATIENT)
Dept: FAMILY MEDICINE | Facility: CLINIC | Age: 37
End: 2022-05-11
Payer: COMMERCIAL

## 2022-05-11 DIAGNOSIS — L03.119 CELLULITIS OF AXILLA, UNSPECIFIED LATERALITY: Primary | ICD-10-CM

## 2022-05-12 RX ORDER — DOXYCYCLINE HYCLATE 100 MG
100 TABLET ORAL 2 TIMES DAILY
Qty: 14 TABLET | Refills: 0 | Status: SHIPPED | OUTPATIENT
Start: 2022-05-12 | End: 2022-05-19

## 2022-05-30 ENCOUNTER — HOSPITAL ENCOUNTER (EMERGENCY)
Facility: CLINIC | Age: 37
Discharge: HOME | End: 2022-05-30
Attending: EMERGENCY MEDICINE | Admitting: EMERGENCY MEDICINE
Payer: COMMERCIAL

## 2022-05-30 VITALS
RESPIRATION RATE: 16 BRPM | SYSTOLIC BLOOD PRESSURE: 129 MMHG | DIASTOLIC BLOOD PRESSURE: 96 MMHG | OXYGEN SATURATION: 94 % | TEMPERATURE: 98.2 F | HEART RATE: 95 BPM

## 2022-05-30 DIAGNOSIS — L73.2 HIDRADENITIS SUPPURATIVA OF LEFT AXILLA: ICD-10-CM

## 2022-05-30 LAB
BASOPHILS # BLD AUTO: 0 10E3/UL (ref 0–0.2)
BASOPHILS NFR BLD AUTO: 0 %
CRP SERPL-MCNC: 13.1 MG/L (ref 0–8)
EOSINOPHIL # BLD AUTO: 0.1 10E3/UL (ref 0–0.7)
EOSINOPHIL NFR BLD AUTO: 1 %
ERYTHROCYTE [DISTWIDTH] IN BLOOD BY AUTOMATED COUNT: 11.7 % (ref 10–15)
HCT VFR BLD AUTO: 37.5 % (ref 35–47)
HGB BLD-MCNC: 12.2 G/DL (ref 11.7–15.7)
HOLD SPECIMEN: NORMAL
IMM GRANULOCYTES # BLD: 0.1 10E3/UL
IMM GRANULOCYTES NFR BLD: 1 %
LACTATE SERPL-SCNC: 1.5 MMOL/L (ref 0.7–2)
LYMPHOCYTES # BLD AUTO: 3.3 10E3/UL (ref 0.8–5.3)
LYMPHOCYTES NFR BLD AUTO: 33 %
MCH RBC QN AUTO: 30.4 PG (ref 26.5–33)
MCHC RBC AUTO-ENTMCNC: 32.5 G/DL (ref 31.5–36.5)
MCV RBC AUTO: 94 FL (ref 78–100)
MONOCYTES # BLD AUTO: 0.5 10E3/UL (ref 0–1.3)
MONOCYTES NFR BLD AUTO: 5 %
NEUTROPHILS # BLD AUTO: 6.2 10E3/UL (ref 1.6–8.3)
NEUTROPHILS NFR BLD AUTO: 60 %
NRBC # BLD AUTO: 0 10E3/UL
NRBC BLD AUTO-RTO: 0 /100
PLATELET # BLD AUTO: 265 10E3/UL (ref 150–450)
RBC # BLD AUTO: 4.01 10E6/UL (ref 3.8–5.2)
WBC # BLD AUTO: 10.2 10E3/UL (ref 4–11)

## 2022-05-30 PROCEDURE — 85025 COMPLETE CBC W/AUTO DIFF WBC: CPT | Performed by: EMERGENCY MEDICINE

## 2022-05-30 PROCEDURE — 36415 COLL VENOUS BLD VENIPUNCTURE: CPT | Performed by: EMERGENCY MEDICINE

## 2022-05-30 PROCEDURE — 250N000013 HC RX MED GY IP 250 OP 250 PS 637: Performed by: EMERGENCY MEDICINE

## 2022-05-30 PROCEDURE — 83605 ASSAY OF LACTIC ACID: CPT | Performed by: EMERGENCY MEDICINE

## 2022-05-30 PROCEDURE — 99283 EMERGENCY DEPT VISIT LOW MDM: CPT

## 2022-05-30 PROCEDURE — 86140 C-REACTIVE PROTEIN: CPT | Performed by: EMERGENCY MEDICINE

## 2022-05-30 RX ORDER — OXYCODONE AND ACETAMINOPHEN 5; 325 MG/1; MG/1
1 TABLET ORAL EVERY 6 HOURS PRN
Qty: 12 TABLET | Refills: 0 | Status: SHIPPED | OUTPATIENT
Start: 2022-05-30 | End: 2022-06-09

## 2022-05-30 RX ORDER — DOXYCYCLINE 100 MG/1
100 CAPSULE ORAL DAILY
Qty: 30 CAPSULE | Refills: 0 | Status: SHIPPED | OUTPATIENT
Start: 2022-05-30 | End: 2022-06-09

## 2022-05-30 RX ORDER — OXYCODONE HYDROCHLORIDE 5 MG/1
5 TABLET ORAL ONCE
Status: COMPLETED | OUTPATIENT
Start: 2022-05-30 | End: 2022-05-30

## 2022-05-30 RX ORDER — CLINDAMYCIN PHOSPHATE 10 MG/G
GEL TOPICAL 2 TIMES DAILY
Qty: 30 G | Refills: 1 | Status: SHIPPED | OUTPATIENT
Start: 2022-05-30 | End: 2022-06-13

## 2022-05-30 RX ADMIN — OXYCODONE HYDROCHLORIDE 5 MG: 5 TABLET ORAL at 17:17

## 2022-05-30 ASSESSMENT — ENCOUNTER SYMPTOMS
CHILLS: 1
FEVER: 0

## 2022-05-30 NOTE — DISCHARGE INSTRUCTIONS
Daily doxycycline.   BID topical clindamycin.   Attempt to schedule follow-up with either surgical dermatologist or plastic surgeon to consider surgery for your hidradenitis.     HIDRADENITIS SUPPURATIVA SPECIALTY CLINIC  Director: Dr. Bruno De Oliveira MD    The Hidradenitis Suppurativa (HS) specialty clinic at the [facility] aims to provide the highest level of evidence-based treatment for HS. In collaboration with dermatologists, laser experts, and surgeons, as well as various other specialties including, but not limited to, obstetrics/gynecology, urology, psychiatry and pain management. This clinic provides a multidisciplinary, holistic approach to HS care. In addition, we also offer various investigational new treatments through our clinical trials unit.  We understand that HS effects each person differently and response to treatment also varies dramatically from one person to the next. Therefore, we believe in a patient-centered, team-based approach to HS management, in which our treatment plans are driven largely by our patients  values and previous experiences.

## 2022-05-30 NOTE — ED TRIAGE NOTES
Presents to the ED reporting worsening cellulitis and abscesses in the left axilla. States was recently treated for abscesses and cellulitis, but symptoms have been worsening again since finished antibiotics about 10 days ago.

## 2022-05-30 NOTE — ED PROVIDER NOTES
History   Chief Complaint:  Wound Check    The history is provided by the patient.      Sherri Lynn is a 36 year old female who presents with cellulitis and multiple abscesses. For the past month she has been having abscesses and cellulitis in her left axilla.  She estimates she was on 20 days of doxycycline.  Since this stopped 10 days ago, patient has had painful recurrence of her symptoms.  She reports multiple sites of drainage.  She has been using warm compresses as well as lidocaine spray.  She reports her insurance is poor and that she has not seen a surgeon nor dermatology.    Review of Systems   Constitutional: Positive for chills. Negative for fever.   Skin: Positive for wound (Abscess ).   All other systems reviewed and are negative.      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  Speaking comfortably.  Musc:  Normal muscular tone  Skin:  Hidradenitis suppurativa to bilateral axilla.  Right axilla shows multiple scarred over lesions.  Left axilla shows multiple crypts/pits with ongoing drainage.  On the most inferior part of her axilla, she appears to be developing a new inflammatory lesion that is not fluctuant.  Very  tender to palpation.  She has surrounding dermatitis in the shape of the dressings she has been applying to the area.  Neuro: Speech is normal and fluent. Face is symmetric. Moving all extremities well.   Psych:  Awake. Alert.  Normal affect.  Appropriate interactions.      Emergency Department Course   Laboratory:  Labs Ordered and Resulted from Time of ED Arrival to Time of ED Departure   CRP INFLAMMATION - Abnormal       Result Value    CRP Inflammation 13.1 (*)    LACTIC ACID WHOLE BLOOD - Normal    Lactic Acid 1.5     CBC WITH PLATELETS AND DIFFERENTIAL    WBC Count 10.2      RBC Count 4.01      Hemoglobin 12.2      Hematocrit 37.5      MCV 94      MCH 30.4      MCHC 32.5      RDW 11.7      Platelet Count 265      % Neutrophils 60      % Lymphocytes 33      % Monocytes 5      % Eosinophils 1      % Basophils 0      % Immature Granulocytes 1      NRBCs per 100 WBC 0      Absolute Neutrophils 6.2      Absolute Lymphocytes 3.3      Absolute Monocytes 0.5      Absolute Eosinophils 0.1      Absolute Basophils 0.0      Absolute Immature Granulocytes 0.1      Absolute NRBCs 0.0         Emergency Department Course:    Reviewed:  I reviewed nursing notes, vitals and past medical history    Assessments:  1709 I obtained history and examined the patient as noted above.   1800 I rechecked the patient and explained findings.     Consults:  1723 I spoke with Dr. Buchanan, general surgery, regarding the patients history and presentation in the ED. She was in the OR.   1754  I spoke with Dr. Buchanan again.     Interventions:  1717    Oxycodone, 5 mg, PO    Disposition:  The patient was discharged to home.     Impression & Plan     Medical Decision Making:  Patient presents with painful lesions in the left axilla.  This is clinically consistent with hidradenitis suppurativa.  No current surrounding cellulitis.  Patient also expressed concern for osteomyelitis, but this is clearly HS. At this stage, I believe patient would  benefit from a surgical consultation as she is in stage II if not stage III.  I discussed with our on-call general surgeon, Dr. Buchanan, who recommended referral to plastic surgery in the event patient needs a flap or skin graft due to extensiveness of lesions.  I also made patient aware that the [facility] has an HS clinic. I also placed referral to surgical dermatology.  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  In the meantime, we will place patient back on doxycycline.  I also prescribed topical clindamycin.  Small prescription for Percocet given as well.  Side effects discussed.  Patient discharged in stable condition.  All questions answered.    Diagnosis:    ICD-10-CM    1. Hidradenitis suppurativa of left axilla  L73.2 Adult Dermatology Referral       Discharge Medications:  New Prescriptions    CLINDAMYCIN (CLINDAMAX) 1 % EXTERNAL GEL    Apply topically 2 times daily for 14 days    DOXYCYCLINE MONOHYDRATE (MONODOX) 100 MG CAPSULE    Take 1 capsule (100 mg) by mouth daily    OXYCODONE-ACETAMINOPHEN (PERCOCET) 5-325 MG TABLET    Take 1 tablet by mouth every 6 hours as needed for severe pain       Scribe Disclosure:  I, Wilmar Mcconnell, am serving as a scribe at 4:59 PM on 5/30/2022 to document services personally performed by Theo Alberto MD based on my observations and the provider's statements to me.            Theo Alberto MD  05/31/22 9581

## 2022-05-31 ENCOUNTER — TELEPHONE (OUTPATIENT)
Dept: DERMATOLOGY | Facility: CLINIC | Age: 37
End: 2022-05-31
Payer: COMMERCIAL

## 2022-05-31 ENCOUNTER — TELEPHONE (OUTPATIENT)
Dept: FAMILY MEDICINE | Facility: CLINIC | Age: 37
End: 2022-05-31

## 2022-05-31 ASSESSMENT — ENCOUNTER SYMPTOMS: WOUND: 1

## 2022-05-31 NOTE — TELEPHONE ENCOUNTER
[facility] Call Center    Phone Message    May a detailed message be left on voicemail: yes     Reason for Call: Appointment Intake    Referring Provider Name: ROME BELLA  Diagnosis and/or Symptoms: L73.2 (ICD-10-CM) - Hidradenitis suppurativa of left axilla    Action Taken: Message routed to:  [facility] DERM    Please call to schedule priority referral [phone]    Travel Screening: Not Applicable

## 2022-06-01 ENCOUNTER — OFFICE VISIT (OUTPATIENT)
Dept: FAMILY MEDICINE | Facility: CLINIC | Age: 37
End: 2022-06-01
Payer: COMMERCIAL

## 2022-06-01 VITALS
DIASTOLIC BLOOD PRESSURE: 66 MMHG | BODY MASS INDEX: 34.02 KG/M2 | HEIGHT: 64 IN | WEIGHT: 199.25 LBS | OXYGEN SATURATION: 98 % | HEART RATE: 117 BPM | SYSTOLIC BLOOD PRESSURE: 126 MMHG

## 2022-06-01 DIAGNOSIS — L73.2 AXILLARY HIDRADENITIS SUPPURATIVA: Primary | ICD-10-CM

## 2022-06-01 DIAGNOSIS — J45.20 MILD INTERMITTENT ASTHMA WITHOUT COMPLICATION: ICD-10-CM

## 2022-06-01 DIAGNOSIS — Z30.017 NEXPLANON INSERTION: ICD-10-CM

## 2022-06-01 DIAGNOSIS — F32.2 CURRENT SEVERE EPISODE OF MAJOR DEPRESSIVE DISORDER WITHOUT PSYCHOTIC FEATURES WITHOUT PRIOR EPISODE (H): ICD-10-CM

## 2022-06-01 DIAGNOSIS — K50.10 CROHN'S DISEASE OF LARGE INTESTINE WITHOUT COMPLICATION (H): ICD-10-CM

## 2022-06-01 DIAGNOSIS — Z02.9 ADMINISTRATIVE ENCOUNTER: ICD-10-CM

## 2022-06-01 PROCEDURE — 99214 OFFICE O/P EST MOD 30 MIN: CPT | Performed by: FAMILY MEDICINE

## 2022-06-01 PROCEDURE — 96127 BRIEF EMOTIONAL/BEHAV ASSMT: CPT | Mod: 59 | Performed by: FAMILY MEDICINE

## 2022-06-01 RX ORDER — ALBUTEROL SULFATE 90 UG/1
2 AEROSOL, METERED RESPIRATORY (INHALATION) EVERY 6 HOURS
Qty: 18 G | Refills: 3 | Status: SHIPPED | OUTPATIENT
Start: 2022-06-01

## 2022-06-01 RX ORDER — OXYCODONE HYDROCHLORIDE 10 MG/1
5 TABLET ORAL EVERY 4 HOURS PRN
Qty: 12 TABLET | Refills: 0 | Status: SHIPPED | OUTPATIENT
Start: 2022-06-01 | End: 2022-06-19

## 2022-06-01 RX ORDER — ACETAMINOPHEN 500 MG
TABLET ORAL
COMMUNITY
Start: 2022-04-27 | End: 2022-08-08

## 2022-06-01 RX ORDER — DIPHENHYDRAMINE HYDROCHLORIDE 12.5 MG/1
12.5 BAR, CHEWABLE ORAL DAILY PRN
COMMUNITY
Start: 2022-04-27

## 2022-06-01 RX ORDER — PARSLEY 450 MG
CAPSULE ORAL
COMMUNITY
Start: 2022-04-27 | End: 2022-08-08

## 2022-06-01 RX ORDER — ESCITALOPRAM OXALATE 20 MG/1
20 TABLET ORAL DAILY
Qty: 90 TABLET | Refills: 3 | Status: SHIPPED | OUTPATIENT
Start: 2022-06-01 | End: 2023-08-21

## 2022-06-01 ASSESSMENT — ANXIETY QUESTIONNAIRES
GAD7 TOTAL SCORE: 15
GAD7 TOTAL SCORE: 15
6. BECOMING EASILY ANNOYED OR IRRITABLE: NEARLY EVERY DAY
7. FEELING AFRAID AS IF SOMETHING AWFUL MIGHT HAPPEN: NEARLY EVERY DAY
1. FEELING NERVOUS, ANXIOUS, OR ON EDGE: MORE THAN HALF THE DAYS
GAD7 TOTAL SCORE: 15
2. NOT BEING ABLE TO STOP OR CONTROL WORRYING: MORE THAN HALF THE DAYS
8. IF YOU CHECKED OFF ANY PROBLEMS, HOW DIFFICULT HAVE THESE MADE IT FOR YOU TO DO YOUR WORK, TAKE CARE OF THINGS AT HOME, OR GET ALONG WITH OTHER PEOPLE?: VERY DIFFICULT
7. FEELING AFRAID AS IF SOMETHING AWFUL MIGHT HAPPEN: NEARLY EVERY DAY
5. BEING SO RESTLESS THAT IT IS HARD TO SIT STILL: SEVERAL DAYS
4. TROUBLE RELAXING: MORE THAN HALF THE DAYS
3. WORRYING TOO MUCH ABOUT DIFFERENT THINGS: MORE THAN HALF THE DAYS

## 2022-06-01 ASSESSMENT — ASTHMA QUESTIONNAIRES
QUESTION_3 LAST FOUR WEEKS HOW OFTEN DID YOUR ASTHMA SYMPTOMS (WHEEZING, COUGHING, SHORTNESS OF BREATH, CHEST TIGHTNESS OR PAIN) WAKE YOU UP AT NIGHT OR EARLIER THAN USUAL IN THE MORNING: NOT AT ALL
QUESTION_2 LAST FOUR WEEKS HOW OFTEN HAVE YOU HAD SHORTNESS OF BREATH: NOT AT ALL
QUESTION_5 LAST FOUR WEEKS HOW WOULD YOU RATE YOUR ASTHMA CONTROL: COMPLETELY CONTROLLED
QUESTION_4 LAST FOUR WEEKS HOW OFTEN HAVE YOU USED YOUR RESCUE INHALER OR NEBULIZER MEDICATION (SUCH AS ALBUTEROL): NOT AT ALL
ACUTE_EXACERBATION_TODAY: NO
QUESTION_1 LAST FOUR WEEKS HOW MUCH OF THE TIME DID YOUR ASTHMA KEEP YOU FROM GETTING AS MUCH DONE AT WORK, SCHOOL OR AT HOME: NONE OF THE TIME
ACT_TOTALSCORE: 25
ACT_TOTALSCORE: 25

## 2022-06-01 ASSESSMENT — PATIENT HEALTH QUESTIONNAIRE - PHQ9
SUM OF ALL RESPONSES TO PHQ QUESTIONS 1-9: 22
SUM OF ALL RESPONSES TO PHQ QUESTIONS 1-9: 22
10. IF YOU CHECKED OFF ANY PROBLEMS, HOW DIFFICULT HAVE THESE PROBLEMS MADE IT FOR YOU TO DO YOUR WORK, TAKE CARE OF THINGS AT HOME, OR GET ALONG WITH OTHER PEOPLE: VERY DIFFICULT

## 2022-06-01 NOTE — PROGRESS NOTES
Assessment/plan   Sherri Lynn is a 36 year old female who is establish patient to my practice here with chief complaint     Patient presents with:  Follow Up: ER 5/30/22 for abscess  under both armpit  Referral: Plastic  surgery  paperwork       Sherri was seen today for follow up, referral and paperwork.    Diagnoses and all orders for this visit:    Axillary hidradenitis suppurativa  Comments:  patient currently taking doxycyclin, local Clindagel patient plan to see a plastic surgeon who can remove the sinus tracts with the skin graft over it does have appointment next week to see them like me to put the request.  Also FMLA paperwork as she will be on leave of absence patient works as a nurse practitioner for palliative care  Orders:  -     Adult Dermatology Referral; Future  -     Plastic Surgery Referral; Future  -     oxyCODONE IR (ROXICODONE) 10 MG tablet; Take 0.5 tablets (5 mg) by mouth every 4 hours as needed for severe pain    Current severe episode of major depressive disorder without psychotic features without prior episode (H)  Comments:  Worsening of depression since going through so many health problems mostly quite inflammation of her left underarm which requiring pain medications  Orders:  -     escitalopram (LEXAPRO) 20 MG tablet; Take 1 tablet (20 mg) by mouth daily  -     Adult Mental Health  Referral; Future    Crohn's disease of large intestine without complication (H)  Comments:  last follow up 2018 with [facility] with colonoscopy/endoscopy    Nexplanon insertion  Comments:  Stable denies any irregular bleeding    Mild intermittent asthma without complication  Comments:  stable   Orders:  -     albuterol (PROAIR HFA/PROVENTIL HFA/VENTOLIN HFA) 108 (90 Base) MCG/ACT inhaler; Inhale 2 puffs into the lungs every 6 hours    Administrative encounter  Comments:  Short-term disability paperwork filled planning to be on leave of absence after her surgical treatment mid  July          Subjective:      HPI: Sherri Lynn is a 36 year old female is here for.    Answers for HPI/ROS submitted by the patient on 6/1/2022  If you checked off any problems, how difficult have these problems made it for you to do your work, take care of things at home, or get along with other people?: Very difficult  PHQ9 TOTAL SCORE: 22  AMANDA 7 TOTAL SCORE: 15  Depression/Anxiety: Depression & Anxiety  Status since last visit:: worse  Anxiety since last: : worse  Other associated symptoms of depression:: No  Other associated symotome: : No  Significant life event: : job concerns, financial concerns, health concerns  Anxious:: Yes  Current substance use:: No  What is the reason for your visit today? : Hidradenitis supprativa  How many servings of fruits and vegetables do you eat daily?: 4 or more  On average, how many sweetened beverages do you drink each day (Examples: soda, juice, sweet tea, etc.  Do NOT count diet or artificially sweetened beverages)?: 0  How many minutes a day do you exercise enough to make your heart beat faster?: 9 or less  How many days a week do you exercise enough to make your heart beat faster?: 3 or less  How many days per week do you miss taking your medication?: 0          I have personally reviewed the patient's allergies, medications, past medical history, family history, social history, rooming notes and problem list in detail and updated the patient record as necessary.      Past Medical History:   Diagnosis Date     Asthma      Crohn's ileitis, unspecified complication (H)      Past Surgical History:   Procedure Laterality Date     RELEASE CARPAL TUNNEL Left 2014     Adhesive tape, Augmentin, Azathioprine, Azathioprine, Flu virus vaccine, Keflex [cephalexin hcl], Peanut-derived, Pneumococcal vaccines, Sulfa drugs, Zithromax [azithromycin dihydrate], Clindamycin, and Penicillins  Current Outpatient Medications   Medication Sig Dispense Refill     acetaminophen (TYLENOL) 500  "MG tablet        albuterol (PROAIR HFA/PROVENTIL HFA/VENTOLIN HFA) 108 (90 Base) MCG/ACT inhaler Inhale 2 puffs into the lungs every 6 hours 18 g 3     Ashwagandha 500 MG CAPS        clindamycin (CLINDAMAX) 1 % external gel Apply topically 2 times daily for 14 days 30 g 1     diphenhydrAMINE HCl 12.5 MG CHEW        doxycycline monohydrate (MONODOX) 100 MG capsule Take 1 capsule (100 mg) by mouth daily 30 capsule 0     escitalopram (LEXAPRO) 20 MG tablet Take 1 tablet (20 mg) by mouth daily 90 tablet 3     etonogestrel (NEXPLANON) 68 MG IMPL 1 each       HEMP OIL OR EXTRACT OR OTHER CBD CANNABINOID, NOT MEDICAL CANNABIS,        oxyCODONE IR (ROXICODONE) 10 MG tablet Take 0.5 tablets (5 mg) by mouth every 4 hours as needed for severe pain 12 tablet 0     oxyCODONE-acetaminophen (PERCOCET) 5-325 MG tablet Take 1 tablet by mouth every 6 hours as needed for severe pain 12 tablet 0     Turmeric Curcumin 500 MG CAPS Take 500 mg by mouth       Family History   Problem Relation Age of Onset     Asthma Mother      Hypertension Mother      Asthma Sister      Asthma Brother        Patient Active Problem List   Diagnosis     CARDIOVASCULAR SCREENING; LDL GOAL LESS THAN 160     Crohn's disease (H)     Carpal tunnel syndrome     Intermittent asthma     Administrative encounter     Lactose intolerance in adult     Inflammatory arthritis     Current severe episode of major depressive disorder without psychotic features without prior episode (H)       Review of Systems   12 point comprehensive review of systems was negative except as noted and HPI     Social History     Social History Narrative     Not on file       Objective:     Vitals:    06/01/22 1149   BP: 126/66   Pulse: 117   SpO2: 98%   Weight: 90.4 kg (199 lb 4 oz)   Height: 1.626 m (5' 4\")       Physical Exam:   Physical Exam:  General Appearance:  Appears comfortable, Alert, cooperative, no distress,   Head: Normocephalic, without obvious abnormality, atraumatic  Eyes: " PERRL, conjunctiva/corneas clear, EOM's intact, both eyes             Nose: Nares normal, no drainage   Throat: Lips, mucosa, and tongue normal; teeth and gums normal  Neck: Supple, symmetrical, trachea midline, no adenopathy;                      Lungs: Clear to auscultation bilaterally, respirations unlabored  Heart: Regular rate and rhythm, S1 and S2 normal, no murmur, rubs or gallop  Extremities: Extremities normal, atraumatic, no cyanosis or edema  Pulses: DP pulses are 1-2+ bilat.    Skin:  Quite inflamed skin left underarm with no active drainage on exam       25minutes spent on the day of encounter doing chart review, history and exam, documentation, and further activities as noted.     This note has been dictated using voice recognition software. Any grammatical or context distortions are unintentional and inherent to the software    Megan Carlson MD     There are no Patient Instructions on file for this visit.

## 2022-06-06 ENCOUNTER — TELEPHONE (OUTPATIENT)
Dept: FAMILY MEDICINE | Facility: CLINIC | Age: 37
End: 2022-06-06
Payer: COMMERCIAL

## 2022-06-09 ENCOUNTER — TELEPHONE (OUTPATIENT)
Dept: FAMILY MEDICINE | Facility: CLINIC | Age: 37
End: 2022-06-09

## 2022-06-09 ENCOUNTER — OFFICE VISIT (OUTPATIENT)
Dept: FAMILY MEDICINE | Facility: CLINIC | Age: 37
End: 2022-06-09
Payer: COMMERCIAL

## 2022-06-09 VITALS
WEIGHT: 195 LBS | OXYGEN SATURATION: 97 % | BODY MASS INDEX: 33.29 KG/M2 | HEART RATE: 76 BPM | DIASTOLIC BLOOD PRESSURE: 76 MMHG | SYSTOLIC BLOOD PRESSURE: 124 MMHG | HEIGHT: 64 IN

## 2022-06-09 DIAGNOSIS — J45.20 MILD INTERMITTENT ASTHMA WITHOUT COMPLICATION: ICD-10-CM

## 2022-06-09 DIAGNOSIS — F32.2 CURRENT SEVERE EPISODE OF MAJOR DEPRESSIVE DISORDER WITHOUT PSYCHOTIC FEATURES WITHOUT PRIOR EPISODE (H): ICD-10-CM

## 2022-06-09 DIAGNOSIS — L73.2 AXILLARY HIDRADENITIS SUPPURATIVA: ICD-10-CM

## 2022-06-09 DIAGNOSIS — Z01.818 PREOP EXAMINATION: Primary | ICD-10-CM

## 2022-06-09 DIAGNOSIS — K50.00 CROHN'S DISEASE OF SMALL INTESTINE WITHOUT COMPLICATION (H): ICD-10-CM

## 2022-06-09 PROCEDURE — 99214 OFFICE O/P EST MOD 30 MIN: CPT | Performed by: FAMILY MEDICINE

## 2022-06-09 RX ORDER — CALCIUM CARBONATE/VITAMIN D3 600MG-62.5
1 CAPSULE ORAL DAILY
COMMUNITY
Start: 2022-06-03

## 2022-06-09 RX ORDER — MULTIPLE VITAMINS W/ MINERALS TAB 9MG-400MCG
1 TAB ORAL DAILY
COMMUNITY
Start: 2022-06-03

## 2022-06-09 RX ORDER — DOXYCYCLINE 100 MG/1
100 CAPSULE ORAL DAILY
Qty: 30 CAPSULE | Refills: 0 | Status: SHIPPED | OUTPATIENT
Start: 2022-06-09 | End: 2022-08-04

## 2022-06-09 NOTE — TELEPHONE ENCOUNTER
Central Prior Authorization Team   Phone: [phone]    PA Initiation    Medication:   Insurance Company:    Pharmacy Filling the Rx: [id number] - [place], [place] - [address] AT [facility]  Filling Pharmacy Phone: [phone]  Filling Pharmacy Fax: [phone]  Start Date: 6/9/2022

## 2022-06-09 NOTE — PROGRESS NOTES
[facility]  [address] [address]  Phone: [phone]  Fax: [phone]  Primary Provider: Megan Carlson  Pre-op Performing Provider: MEGAN CARLSON      PREOPERATIVE EVALUATION:  Today's date: 6/9/2022    Sherri Lynn is a 36 year old female who presents for a preoperative evaluation.    Surgical Information:  Surgery/Procedure: tissue removal on right and left axillar  Surgery Location: [facility]  Surgeon: Dr. Kayla Middleton  Surgery Date: 7/7/22  Time of Surgery: NTK  Where patient plans to recover: At home with family  Fax number for surgical facility: phone [phone]    Type of Anesthesia Anticipated: Local with MAC    Assessment & Plan     The proposed surgical procedure is considered LOW risk.    Sherri was seen today for pre-op exam.    Diagnoses and all orders for this visit:    Preop examination  Comments:  clear for surgery, FMLA paperwork done last wk     Axillary hidradenitis suppurativa  Comments:  recently seen for this problem, no new changes   Orders:  -     doxycycline monohydrate (MONODOX) 100 MG capsule; Take 1 capsule (100 mg) by mouth daily    Mild intermittent asthma without complication  Comments:  stable     Crohn's disease of small intestine without complication (H)  Comments:  stable    Current severe episode of major depressive disorder without psychotic features without prior episode (H)  Comments:  feel lexapro working well no new suicidal ideation             Risks and Recommendations:  The patient has the following additional risks and recommendations for perioperative complications:   - No identified additional risk factors other than previously addressed    Medication Instructions:  Patient is to take all scheduled medications on the day of surgery EXCEPT for modifications listed below:    RECOMMENDATION:  APPROVAL GIVEN to proceed with proposed procedure, without further diagnostic evaluation.    Review of  external notes as documented above SURGERY   Subjective     HPI related to upcoming procedure: skin procedure to help removal sinus track from hydradenitis suppurativa and most likely get skin grafting    Preop Questions 6/9/2022   1. Have you ever had a heart attack or stroke? No   2. Have you ever had surgery on your heart or blood vessels, such as a stent placement, a coronary artery bypass, or surgery on an artery in your head, neck, heart, or legs? No   3. Do you have chest pain with activity? No   4. Do you have a history of  heart failure? No   5. Do you currently have a cold, bronchitis or symptoms of other infection? No   6. Do you have a cough, shortness of breath, or wheezing? No   7. Do you or anyone in your family have previous history of blood clots? No   8. Do you or does anyone in your family have a serious bleeding problem such as prolonged bleeding following surgeries or cuts? No   9. Have you ever had problems with anemia or been told to take iron pills? YES -    10. Have you had any abnormal blood loss such as black, tarry or bloody stools, or abnormal vaginal bleeding? No   11. Have you ever had a blood transfusion? No   12. Are you willing to have a blood transfusion if it is medically needed before, during, or after your surgery? Yes   13. Have you or any of your relatives ever had problems with anesthesia? No   14. Do you have sleep apnea, excessive snoring or daytime drowsiness? No   15. Do you have any artifical heart valves or other implanted medical devices like a pacemaker, defibrillator, or continuous glucose monitor? No   16. Do you have artificial joints? No   17. Are you allergic to latex? No   18. Is there any chance that you may be pregnant? No       Health Care Directive:  Patient does not have a Health Care Directive or Living Will: Discussed advance care planning with patient; information given to patient to review.    Preoperative Review of :   reviewed - controlled  substances reflected in medication list.  56}    Status of Chronic Conditions:  See problem list for active medical problems.  Problems all longstanding and stable, except as noted/documented.  See ROS for pertinent symptoms related to these conditions.      Review of Systems  Constitutional, neuro, ENT, endocrine, pulmonary, cardiac, gastrointestinal, genitourinary, musculoskeletal, integument and psychiatric systems are negative, except as otherwise noted.    Patient Active Problem List    Diagnosis Date Noted     Hidradenitis suppurativa 06/09/2022     Priority: Medium     Inflammatory arthritis 03/31/2022     Priority: Medium     Current severe episode of major depressive disorder without psychotic features without prior episode (H) 03/31/2022     Priority: Medium     Administrative encounter 11/21/2017     Priority: Medium     CARDIOVASCULAR SCREENING; LDL GOAL LESS THAN 160 03/24/2015     Priority: Medium     Crohn's disease (H) 03/24/2015     Priority: Medium     To see gastroenterology at [place]; previously at [facility]       Carpal tunnel syndrome 03/24/2015     Priority: Medium     Left sided surgery.        Intermittent asthma 03/24/2015     Priority: Medium     Lactose intolerance in adult 10/28/2008     Priority: Medium      Past Medical History:   Diagnosis Date     Asthma      Crohn's ileitis, unspecified complication (H)      Past Surgical History:   Procedure Laterality Date     RELEASE CARPAL TUNNEL Left 2014     Current Outpatient Medications   Medication Sig Dispense Refill     acetaminophen (TYLENOL) 500 MG tablet        albuterol (PROAIR HFA/PROVENTIL HFA/VENTOLIN HFA) 108 (90 Base) MCG/ACT inhaler Inhale 2 puffs into the lungs every 6 hours 18 g 3     Ashwagandha 500 MG CAPS        clindamycin (CLINDAMAX) 1 % external gel Apply topically 2 times daily for 14 days 30 g 1     diphenhydrAMINE HCl 12.5 MG CHEW        doxycycline monohydrate (MONODOX) 100 MG capsule Take 1 capsule (100 mg) by  "mouth daily 30 capsule 0     escitalopram (LEXAPRO) 20 MG tablet Take 1 tablet (20 mg) by mouth daily 90 tablet 3     etonogestrel (NEXPLANON) 68 MG IMPL 1 each       HEMP OIL OR EXTRACT OR OTHER CBD CANNABINOID, NOT MEDICAL CANNABIS,        multivitamin w/minerals (THERA-VIT-M) tablet        oxyCODONE IR (ROXICODONE) 10 MG tablet Take 0.5 tablets (5 mg) by mouth every 4 hours as needed for severe pain 12 tablet 0     Probiotic Product (PROBIOTIC-10 ULTIMATE) CAPS        Turmeric Curcumin 500 MG CAPS Take 500 mg by mouth         Allergies   Allergen Reactions     Adhesive Tape      Augmentin      Azathioprine      Azathioprine Other (See Comments)     pancreatitits     Certolizumab Pegol Hives     Flu Virus Vaccine      Humira Swelling     Keflex [Cephalexin Hcl]      Peanut-Derived Hives     Pneumococcal Vaccines      Sulfa Drugs Itching     rashes     Vedolizumab Fatigue, Headache, Nausea and Photosensitivity     Zithromax [Azithromycin Dihydrate]      Clindamycin Rash     Penicillins Rash        Social History     Tobacco Use     Smoking status: Never Smoker     Smokeless tobacco: Never Used   Substance Use Topics     Alcohol use: Yes     Family History   Problem Relation Age of Onset     Asthma Mother      Hypertension Mother      Asthma Sister      Asthma Brother      History   Drug Use No         Objective     /76   Pulse 76   Ht 1.626 m (5' 4\")   Wt 88.5 kg (195 lb)   LMP 05/31/2022   SpO2 97%   BMI 33.47 kg/m      Physical Exam  GENERAL APPEARANCE: healthy, alert and no distress  HENT: ear canals and TM's normal and nose and mouth without ulcers or lesions  RESP: lungs clear to auscultation - no rales, rhonchi or wheezes  CV: regular rate and rhythm, normal S1 S2, no S3 or S4 and no murmur, click or rub   ABDOMEN: soft, nontender, no HSM or masses and bowel sounds normal  SKIN:  Significant skin inflammation both armpits , no active sign of infection     Recent Labs   Lab Test 05/30/22  1620 " 10/15/20  1126   HGB 12.2 12.8    300   NA  --  138   POTASSIUM  --  3.8   CR  --  0.79        Diagnostics:  No labs were ordered during this visit.       Revised Cardiac Risk Index (RCRI):  The patient has the following serious cardiovascular risks for perioperative complications:   - No serious cardiac risks = 0 points     RCRI Interpretation: 0 points: Class I (very low risk - 0.4% complication rate)           Signed Electronically by: Megan Carlson MD  Copy of this evaluation report is provided to requesting physician.

## 2022-06-13 ENCOUNTER — MYC REFILL (OUTPATIENT)
Dept: FAMILY MEDICINE | Facility: CLINIC | Age: 37
End: 2022-06-13

## 2022-06-13 DIAGNOSIS — L73.2 AXILLARY HIDRADENITIS SUPPURATIVA: ICD-10-CM

## 2022-06-14 NOTE — TELEPHONE ENCOUNTER
Routing refill request to provider for review/approval because:  Controlled substance request    Last Written Prescription Date:  6/1/22  Last Fill Quantity: 12,  # refills: 0   Last office visit provider:  6/9/22     Requested Prescriptions   Pending Prescriptions Disp Refills     oxyCODONE IR (ROXICODONE) 10 MG tablet 12 tablet 0     Sig: Take 0.5 tablets (5 mg) by mouth every 4 hours as needed for severe pain       There is no refill protocol information for this order          Olvin Buck RN 06/14/22 11:44 AM

## 2022-06-16 NOTE — TELEPHONE ENCOUNTER
Last seen 6/9/22, last filled  6/1/22, disp #12.    Vanesa SHOOK CMA ([facility])

## 2022-06-20 RX ORDER — OXYCODONE HYDROCHLORIDE 10 MG/1
5 TABLET ORAL EVERY 4 HOURS PRN
Qty: 12 TABLET | Refills: 0 | OUTPATIENT
Start: 2022-06-20

## 2022-06-20 NOTE — TELEPHONE ENCOUNTER
Patient should ask her surgeon to fill the med if still need it   I only prescribed prior to surgery

## 2022-06-28 NOTE — TELEPHONE ENCOUNTER
I have located the forms. I will look at in the morning with Dr. Carlson when she gets in. I am leaving for the afternoon. Forms on my desk.

## 2022-06-28 NOTE — TELEPHONE ENCOUNTER
6-28-22  Ruth has questions on Disability claim form we faxed them.  On Page 2 under the DX it states a Stage 3 (the rest of unreadable) pt put she was out for stage 3 and we put pt is out for Anxiety?   Ruth wants to know is pt being treated for stage 3 and anxiety?  Was pt treated for stage 3 during oct/nov and dec in 2021?  raeann

## 2022-06-28 NOTE — TELEPHONE ENCOUNTER
I am not sure stage 3 as I don't have that in DX for any disease we talked during her visit and preop     Megan Carlson MD 6/28/2022 1:21 PM   [facility].  [phone]

## 2022-06-29 NOTE — TELEPHONE ENCOUNTER
"Attempted to call Cheyanne back but she is out of office starting today until Fri 07/01/22. Cheyanne provided her partner's number Soha (# [phone]). Left a message on Soha's number to call back in regards to provider's note below. Dr Carlson and patient did not discuss a \"stage 3\"? Patient out due to mental health. \"moderate depression & anxiety\" on 10/22/21 and 11/18/21 follow up on allergic rhinitis and mental health is what Robyn wrote.    Forms in my hanging basket still.  "

## 2022-06-30 NOTE — TELEPHONE ENCOUNTER
6-30-22  Soha called back form [facility] & is returning Klarissa's call, msg below didn't say to relay msg cary

## 2022-07-07 PROCEDURE — 88305 TISSUE EXAM BY PATHOLOGIST: CPT | Mod: 26 | Performed by: PATHOLOGY

## 2022-07-07 PROCEDURE — 88305 TISSUE EXAM BY PATHOLOGIST: CPT | Mod: TC,ORL | Performed by: PLASTIC SURGERY

## 2022-07-08 ENCOUNTER — LAB REQUISITION (OUTPATIENT)
Dept: LAB | Facility: CLINIC | Age: 37
End: 2022-07-08
Payer: COMMERCIAL

## 2022-07-12 LAB
PATH REPORT.COMMENTS IMP SPEC: NORMAL
PATH REPORT.COMMENTS IMP SPEC: NORMAL
PATH REPORT.FINAL DX SPEC: NORMAL
PATH REPORT.GROSS SPEC: NORMAL
PATH REPORT.MICROSCOPIC SPEC OTHER STN: NORMAL
PATH REPORT.RELEVANT HX SPEC: NORMAL
PHOTO IMAGE: NORMAL

## 2022-07-12 NOTE — TELEPHONE ENCOUNTER
FUTURE VISIT INFORMATION      FUTURE VISIT INFORMATION:    Date: 9.27.22    Time: 2:30    [place] [facility]  REFERRAL INFORMATION:    Referring provider:  Robyn    Referring providers clinic:  Family Medicine    Reason for visit/diagnosis  New HS referral    RECORDS REQUESTED FROM:       Clinic name Comments Records Status   Fam Med 6.9.22, 6.1.22  [facility] 5.30.22  [facility]   Ur[facility] 4.30.22  [facility]    7.21.20  [facility]

## 2022-07-19 ENCOUNTER — TELEPHONE (OUTPATIENT)
Dept: FAMILY MEDICINE | Facility: CLINIC | Age: 37
End: 2022-07-19

## 2022-07-19 NOTE — TELEPHONE ENCOUNTER
Short term disability is calling for information on patient health record.    Cheyanne: [phone]

## 2022-07-19 NOTE — TELEPHONE ENCOUNTER
Please look for the paperwork we recently filled her disability paperwork please copy most of the information as much as he can before placing on my desk thank you so much    Megan Carlson MD 7/19/2022 2:02 PM   [facility].  [phone]

## 2022-07-31 ENCOUNTER — HOSPITAL ENCOUNTER (EMERGENCY)
Facility: CLINIC | Age: 37
Discharge: HOME | End: 2022-07-31
Attending: EMERGENCY MEDICINE | Admitting: EMERGENCY MEDICINE
Payer: COMMERCIAL

## 2022-07-31 VITALS
OXYGEN SATURATION: 100 % | SYSTOLIC BLOOD PRESSURE: 122 MMHG | TEMPERATURE: 96.9 F | HEART RATE: 92 BPM | RESPIRATION RATE: 20 BRPM | BODY MASS INDEX: 33.66 KG/M2 | WEIGHT: 190 LBS | DIASTOLIC BLOOD PRESSURE: 79 MMHG | HEIGHT: 63 IN

## 2022-07-31 DIAGNOSIS — R20.0 LEFT ARM NUMBNESS: ICD-10-CM

## 2022-07-31 DIAGNOSIS — Z48.89 ENCOUNTER FOR POST SURGICAL WOUND CHECK: ICD-10-CM

## 2022-07-31 LAB — GLUCOSE BLDC GLUCOMTR-MCNC: 266 MG/DL (ref 70–99)

## 2022-07-31 PROCEDURE — 99283 EMERGENCY DEPT VISIT LOW MDM: CPT

## 2022-07-31 PROCEDURE — 76882 US LMTD JT/FCL EVL NVASC XTR: CPT

## 2022-07-31 PROCEDURE — 99284 EMERGENCY DEPT VISIT MOD MDM: CPT | Mod: 25

## 2022-07-31 RX ORDER — OXYCODONE HYDROCHLORIDE 5 MG/1
5 TABLET ORAL EVERY 6 HOURS PRN
Qty: 6 TABLET | Refills: 0 | Status: SHIPPED | OUTPATIENT
Start: 2022-07-31 | End: 2022-08-08

## 2022-08-01 NOTE — DISCHARGE INSTRUCTIONS
Please follow-up with your surgeon in the next 2 to 3 days.  You do have some fluid collections noted on ultrasound although they are relatively small.  I will defer decision to drain these collections up to your primary surgeon.  Please continue with doxycycline.  You may use a cock up wrist splint to assist with the discomfort and numbness in your left hand and wrist.

## 2022-08-02 ENCOUNTER — OFFICE VISIT (OUTPATIENT)
Dept: URGENT CARE | Facility: URGENT CARE | Age: 37
End: 2022-08-02
Payer: COMMERCIAL

## 2022-08-02 ENCOUNTER — TELEPHONE (OUTPATIENT)
Dept: NEUROLOGY | Facility: CLINIC | Age: 37
End: 2022-08-02

## 2022-08-02 VITALS
TEMPERATURE: 97.6 F | SYSTOLIC BLOOD PRESSURE: 114 MMHG | DIASTOLIC BLOOD PRESSURE: 76 MMHG | OXYGEN SATURATION: 98 % | HEART RATE: 96 BPM

## 2022-08-02 DIAGNOSIS — R20.2 NUMBNESS AND TINGLING IN LEFT HAND: Primary | ICD-10-CM

## 2022-08-02 DIAGNOSIS — R20.0 NUMBNESS AND TINGLING IN LEFT HAND: Primary | ICD-10-CM

## 2022-08-02 DIAGNOSIS — R73.9 ELEVATED BLOOD SUGAR LEVEL: ICD-10-CM

## 2022-08-02 LAB
BASOPHILS # BLD AUTO: 0 10E3/UL (ref 0–0.2)
BASOPHILS NFR BLD AUTO: 0 %
EOSINOPHIL # BLD AUTO: 0.1 10E3/UL (ref 0–0.7)
EOSINOPHIL NFR BLD AUTO: 1 %
ERYTHROCYTE [DISTWIDTH] IN BLOOD BY AUTOMATED COUNT: 12.3 % (ref 10–15)
HCT VFR BLD AUTO: 36.9 % (ref 35–47)
HGB BLD-MCNC: 12.3 G/DL (ref 11.7–15.7)
LYMPHOCYTES # BLD AUTO: 2.5 10E3/UL (ref 0.8–5.3)
LYMPHOCYTES NFR BLD AUTO: 28 %
MCH RBC QN AUTO: 32.4 PG (ref 26.5–33)
MCHC RBC AUTO-ENTMCNC: 33.3 G/DL (ref 31.5–36.5)
MCV RBC AUTO: 97 FL (ref 78–100)
MONOCYTES # BLD AUTO: 0.5 10E3/UL (ref 0–1.3)
MONOCYTES NFR BLD AUTO: 6 %
NEUTROPHILS # BLD AUTO: 5.6 10E3/UL (ref 1.6–8.3)
NEUTROPHILS NFR BLD AUTO: 65 %
PLATELET # BLD AUTO: 235 10E3/UL (ref 150–450)
RBC # BLD AUTO: 3.8 10E6/UL (ref 3.8–5.2)
WBC # BLD AUTO: 8.6 10E3/UL (ref 4–11)

## 2022-08-02 PROCEDURE — 82607 VITAMIN B-12: CPT | Performed by: PHYSICIAN ASSISTANT

## 2022-08-02 PROCEDURE — 85025 COMPLETE CBC W/AUTO DIFF WBC: CPT | Performed by: PHYSICIAN ASSISTANT

## 2022-08-02 PROCEDURE — 82306 VITAMIN D 25 HYDROXY: CPT | Performed by: PHYSICIAN ASSISTANT

## 2022-08-02 PROCEDURE — 86140 C-REACTIVE PROTEIN: CPT | Performed by: PHYSICIAN ASSISTANT

## 2022-08-02 PROCEDURE — 83036 HEMOGLOBIN GLYCOSYLATED A1C: CPT | Performed by: PHYSICIAN ASSISTANT

## 2022-08-02 PROCEDURE — 84439 ASSAY OF FREE THYROXINE: CPT | Performed by: PHYSICIAN ASSISTANT

## 2022-08-02 PROCEDURE — 80048 BASIC METABOLIC PNL TOTAL CA: CPT | Performed by: PHYSICIAN ASSISTANT

## 2022-08-02 PROCEDURE — 36415 COLL VENOUS BLD VENIPUNCTURE: CPT | Performed by: PHYSICIAN ASSISTANT

## 2022-08-02 PROCEDURE — 99214 OFFICE O/P EST MOD 30 MIN: CPT | Performed by: PHYSICIAN ASSISTANT

## 2022-08-02 PROCEDURE — 84443 ASSAY THYROID STIM HORMONE: CPT | Performed by: PHYSICIAN ASSISTANT

## 2022-08-02 ASSESSMENT — ENCOUNTER SYMPTOMS
VOMITING: 0
SHORTNESS OF BREATH: 0
FEVER: 0

## 2022-08-02 NOTE — ED PROVIDER NOTES
History     Chief Complaint:  Post-op Problem      HPI     Sherri Lynn is a 36 year old female who presents with left arm discomfort and numbness.  On July 7th patient had surgery for hidradenitis suppurativa by a plastic surgeon.  She reports that the surgery went well.  Over the last couple days she has noted increased drainage from one of the sites which has been purulent in nature.  She is currently maintained on doxycycline.  The pain is primary isolated to the left axilla, mild and constant.  She denies any fever or myalgias.  90 minutes prior to arrival she developed paresthesias in her left upper extremity from the distal forearm into the hand.  It involves the first 3 digits.  She still has sensation but reports dysesthesias.    Review of Systems   Constitutional: Negative for fever.   Respiratory: Negative for shortness of breath.    Gastrointestinal: Negative for vomiting.   All other systems reviewed and are negative.      Allergies:  Adhesive Tape  Augmentin  Azathioprine  Azathioprine  Certolizumab Pegol  Flu Virus Vaccine  Humira  Keflex [Cephalexin Hcl]  Peanut-Derived  Pneumococcal Vaccines  Sulfa Drugs  Vedolizumab  Zithromax [Azithromycin Dihydrate]  Clindamycin  Penicillins      Medications:    oxyCODONE (ROXICODONE) 5 MG tablet  acetaminophen (TYLENOL) 500 MG tablet  albuterol (PROAIR HFA/PROVENTIL HFA/VENTOLIN HFA) 108 (90 Base) MCG/ACT inhaler  Ashwagandha 500 MG CAPS  diphenhydrAMINE HCl 12.5 MG CHEW  doxycycline monohydrate (MONODOX) 100 MG capsule  escitalopram (LEXAPRO) 20 MG tablet  etonogestrel (NEXPLANON) 68 MG IMPL  HEMP OIL OR EXTRACT OR OTHER CBD CANNABINOID, NOT MEDICAL CANNABIS,  multivitamin w/minerals (THERA-VIT-M) tablet  oxyCODONE IR (ROXICODONE) 10 MG tablet  Probiotic Product (PROBIOTIC-10 ULTIMATE) CAPS  Turmeric Curcumin 500 MG CAPS        Past Medical History:    Past Medical History:   Diagnosis Date     Asthma      Crohn's ileitis, unspecified complication (H)       Patient Active Problem List    Diagnosis Date Noted     Hidradenitis suppurativa 06/09/2022     Priority: Medium     Inflammatory arthritis 03/31/2022     Priority: Medium     Current severe episode of major depressive disorder without psychotic features without prior episode (H) 03/31/2022     Priority: Medium     Administrative encounter 11/21/2017     Priority: Medium     CARDIOVASCULAR SCREENING; LDL GOAL LESS THAN 160 03/24/2015     Priority: Medium     Crohn's disease (H) 03/24/2015     Priority: Medium     To see gastroenterology at [place]; previously at [facility]       Carpal tunnel syndrome 03/24/2015     Priority: Medium     Left sided surgery.        Intermittent asthma 03/24/2015     Priority: Medium     Lactose intolerance in adult 10/28/2008     Priority: Medium        Past Surgical History:    Hidradenitis   Carpal tunnel release    Family History:    Family History   Problem Relation Age of Onset     Asthma Mother      Hypertension Mother      Asthma Sister      Asthma Brother        Social History:  The patient presents to the ED with significant other    Physical Exam     No data found.    Physical Exam    General:   Pleasant age appropriate female resting comfortably in bed  Eyes:    Conjunctiva normal  Neck:    Supple, no meningismus.     CV:     Regular rate and rhythm.      No murmurs, rubs or gallops.       No  lower extremity edema.  PULM:    Clear to auscultation bilateral.       No respiratory distress.      Good air exchange.  ABD:    Soft, non-tender, non-distended.       No rebound, guarding or rigidity.  MSK:     No gross deformity to all four extremities.   LYMPH:   No cervical lymphadenopathy.  NEURO:   Alert, good muscular tone, no atrophy.      LUE      Paresthesias in a median nerve distribution otherwise sensation intact      Strength intact to the left upper extremity  Skin:    Warm, dry      Left axilla:      Well-healing surgical incisions      Small area of dehiscence  near the most caudal surgical incision without active purulent drainage       Mild associated tenderness       No adjacent erythema or induration  Psych:    Mood is good and affect is appropriate.      Emergency Department Course     Imaging:  POC US SOFT TISSUE   Final Result   [facility] Procedure Note       Limited Bedside ED Ultrasound of Soft Tissue:      PROCEDURE: PERFORMED BY: Dr. Erickson Pacheco MD   INDICATIONS/SYMPTOM: Skin redness, evaluate for abscess, cellulitis or foreign body   PROBE: High frequency linear probe   BODY LOCATION: Soft tissue located on left axilla       FINDINGS: Cobblestoning of soft tissue: absent    Hypoechoic fluid (ie abscess) identified: Well-circumscribed hypoechoic fluid collection cephalad to primary area of discomfort.  No focal tenderness over this area.  There is a 6 mm shallow not well-circumscribed fluid collection over the area of discomfort.      INTERPRETATION:  The soft tissue and muscle layers were evaluated.       Findings indicate subcutaneous fluid collection.      IMAGE DOCUMENTATION: Images were archived to PACs system.           Report per myself    Laboratory:  Labs Ordered and Resulted from Time of ED Arrival to Time of ED Departure   GLUCOSE BY METER - Abnormal       Result Value    GLUCOSE BY METER POCT 266 (*)          Emergency Department Course:    Reviewed:  I reviewed nursing notes, vitals and past medical history    Assessments:   I obtained history and examined the patient as noted above.    I rechecked the patient and explained findings.     Disposition:  The patient was discharged to home.     Impression & Plan        Medical Decision Makin-year-old female presents to the ED 3 weeks out from left hidradenitis surgery to the left axilla with increase in drainage and now associated numbness.  There is no overt cellulitis.  Ultrasound reveals a well-circumscribed fluid collection but this not in area of discomfort thus uncertain  the significance of this fluid collection.  Ultrasound also reveals a 6 mm shallow fluid collection overlying the area of discomfort but is actively draining at home.  Given her recent surgical intervention and active drainage, we have collectively decided that we will let this drain without formal incision and drainage.  She will follow-up with her primary plastic surgeon.  She is currently maintained on doxycycline.    In regards to her left upper extremity numbness, it is in median nerve distribution.  It is not consistent with a central process.  She has prior carpal tunnel syndrome but status post carpal tunnel release.  There is no large mass/fluid collection that would suggest direct impingement on brachial plexus although there may be some adjacent irritation.  Patient comfortable with discharge home with limited supply of analgesics and follow-up with her plastic surgeon the next 2 to 3 days.    Diagnosis:    ICD-10-CM    1. Encounter for post surgical wound check  Z48.89    2. Left arm numbness  R20.0        Discharge Medications:  Discharge Medication List as of 7/31/2022  8:27 PM      START taking these medications    Details   !! oxyCODONE (ROXICODONE) 5 MG tablet Take 1 tablet (5 mg) by mouth every 6 hours as needed for severe pain, Disp-6 tablet, R-0, [facility]       !! - Potential duplicate medications found. Please discuss with provider.               Erickson Pacheco MD  08/02/22 8551

## 2022-08-02 NOTE — LETTER
[facility] [place]  [address]  [address]  [place] [address]  Phone: [phone]  Fax: [phone]    August 2, 2022        Sherri Lynn  [facility]  [place] [address]          To whom it may concern:    RE: Sherri Lynn    Patient was seen today at our clinic. She was previously seen 7/29/22 in our emergency department.    Please contact me for questions or concerns.      Sincerely,        Eduardo Ortez PA-C

## 2022-08-02 NOTE — TELEPHONE ENCOUNTER
Health Call Center    Phone Message    May a detailed message be left on voicemail: yes     Reason for Call: Appointment Intake    Referring Provider Name:  Ref Eduardo Ortez PA-C-  Diagnosis and/or Symptoms: Numbness and tingling in left hand    URGENT REFERRAL 1-3 DAYS    Patient is scheduled in December and has been added to the wait list. Please review and contact the patient to discuss scheduling options.    Action Taken: Message routed to:  Other: [facility]    Travel Screening: Not Applicable

## 2022-08-02 NOTE — PROGRESS NOTES
SUBJECTIVE:   Sherri Lynn is a 36 year old female presenting with a chief complaint of intermittent diminished sensation and paresthesias of left hand on ulnar and median distribution.   Spoke with plastics, who felt symptoms could not be related to her procedure.  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  We discussed that abnormal labs will not likely lead to a plan from UC, but can be addressed with her PCP.  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    Past Medical History:   Diagnosis Date     Asthma      Crohn's ileitis, unspecified complication (H)      Current Outpatient Medications   Medication Sig Dispense Refill     acetaminophen (TYLENOL) 500 MG tablet        albuterol (PROAIR HFA/PROVENTIL HFA/VENTOLIN HFA) 108 (90 Base) MCG/ACT inhaler Inhale 2 puffs into the lungs every 6 hours 18 g 3     Ashwagandha 500 MG CAPS        diphenhydrAMINE HCl 12.5 MG CHEW        escitalopram (LEXAPRO) 20 MG tablet Take 1 tablet (20 mg) by mouth daily 90 tablet 3     etonogestrel (NEXPLANON) 68 MG IMPL 1 each       HEMP OIL OR EXTRACT OR OTHER CBD CANNABINOID, NOT MEDICAL CANNABIS,        multivitamin w/minerals (THERA-VIT-M) tablet        oxyCODONE (ROXICODONE) 5 MG tablet Take 1 tablet (5 mg) by mouth every 6 hours as needed for severe pain 6 tablet 0     oxyCODONE IR (ROXICODONE) 10 MG tablet Take 0.5 tablets (5 mg) by mouth every 4 hours as needed for severe pain 6 tablet 0     Probiotic Product (PROBIOTIC-10 ULTIMATE) CAPS        Turmeric Curcumin 500 MG CAPS Take 500 mg by mouth       doxycycline monohydrate (MONODOX) 100 MG capsule Take 1 capsule (100 mg) by mouth daily (Patient not taking: Reported on 8/2/2022) 30 capsule 0     Social History     Tobacco Use     Smoking status: Never Smoker     Smokeless tobacco: Never Used   Substance Use Topics     Alcohol use:  Yes       ROS:  Review of systems negative except as stated above.    OBJECTIVE:  /76 (BP Location: Right arm, Patient Position: Sitting, Cuff Size: Adult Large)   Pulse 96   Temp 97.6  F (36.4  C) (Tympanic)   SpO2 98%   GENERAL APPEARANCE: healthy, alert and no distress  RESP: lungs clear to auscultation - no rales, rhonchi or wheezes  CV: regular rates and rhythm, normal S1 S2, no murmur noted  MS: some pain with resisted wrist flexion  NEURO: Normal strength and tone, sensory exam grossly normal,  normal speech and mentation  SKIN: no suspicious lesions or rashes    ASSESSMENT:  (R20.0,  R20.2) Numbness and tingling in left hand  (primary encounter diagnosis)  Plan: TSH with free T4 reflex, CBC with platelets and        differential, Basic metabolic panel  (Ca, Cl,         CO2, Creat, Gluc, K, Na, BUN), Vitamin B12,         Vitamin D Deficiency, Adult Neurology         Referral, Wrist/Arm/Hand Supplies Order for DME        - ONLY FOR DME, CRP, inflammation      There are no Patient Instructions on file for this visit.

## 2022-08-03 ENCOUNTER — NURSE TRIAGE (OUTPATIENT)
Dept: NURSING | Facility: CLINIC | Age: 37
End: 2022-08-03

## 2022-08-03 DIAGNOSIS — L73.2 AXILLARY HIDRADENITIS SUPPURATIVA: ICD-10-CM

## 2022-08-03 LAB
ANION GAP SERPL CALCULATED.3IONS-SCNC: 7 MMOL/L (ref 3–14)
BUN SERPL-MCNC: 12 MG/DL (ref 7–30)
CALCIUM SERPL-MCNC: 8.9 MG/DL (ref 8.5–10.1)
CHLORIDE BLD-SCNC: 100 MMOL/L (ref 94–109)
CO2 SERPL-SCNC: 27 MMOL/L (ref 20–32)
CREAT SERPL-MCNC: 0.73 MG/DL (ref 0.52–1.04)
CRP SERPL-MCNC: 7.44 MG/L
DEPRECATED CALCIDIOL+CALCIFEROL SERPL-MC: 37 UG/L (ref 20–75)
GFR SERPL CREATININE-BSD FRML MDRD: >90 ML/MIN/1.73M2
GLUCOSE BLD-MCNC: 302 MG/DL (ref 70–99)
POTASSIUM BLD-SCNC: 4.2 MMOL/L (ref 3.4–5.3)
SODIUM SERPL-SCNC: 134 MMOL/L (ref 133–144)
T4 FREE SERPL-MCNC: 0.4 NG/DL (ref 0.76–1.46)
TSH SERPL DL<=0.005 MIU/L-ACNC: 33.97 MU/L (ref 0.4–4)
VIT B12 SERPL-MCNC: 449 PG/ML (ref 232–1245)

## 2022-08-03 NOTE — TELEPHONE ENCOUNTER
Pt calling to see if her other labs have been released (Vit D, BMP, TSH).  Advised pt to call back in a few hours as they are still lilsted as being in process.      The patient indicates understanding of these issues and agrees with the plan.    Jeaneth Bowling, RN  [facility] Triage Nurse Advisor   8/3/2022 2:10 PM     Additional Information    Information only question and nurse able to answer    Protocols used: INFORMATION ONLY CALL - NO TRIAGE-A-OH

## 2022-08-04 ENCOUNTER — TELEPHONE (OUTPATIENT)
Dept: FAMILY MEDICINE | Facility: CLINIC | Age: 37
End: 2022-08-04

## 2022-08-04 ENCOUNTER — HOSPITAL ENCOUNTER (EMERGENCY)
Facility: CLINIC | Age: 37
Discharge: HOME | End: 2022-08-04
Attending: EMERGENCY MEDICINE | Admitting: EMERGENCY MEDICINE
Payer: COMMERCIAL

## 2022-08-04 VITALS
OXYGEN SATURATION: 95 % | TEMPERATURE: 98.3 F | DIASTOLIC BLOOD PRESSURE: 87 MMHG | SYSTOLIC BLOOD PRESSURE: 134 MMHG | RESPIRATION RATE: 18 BRPM | HEART RATE: 98 BPM

## 2022-08-04 DIAGNOSIS — E78.5 HYPERLIPIDEMIA LDL GOAL <160: ICD-10-CM

## 2022-08-04 DIAGNOSIS — E03.9 HYPOTHYROIDISM, UNSPECIFIED TYPE: ICD-10-CM

## 2022-08-04 LAB
ALBUMIN SERPL BCG-MCNC: 4.7 G/DL (ref 3.5–5.2)
ALP SERPL-CCNC: 84 U/L (ref 35–104)
ALT SERPL W P-5'-P-CCNC: 143 U/L (ref 10–35)
ANION GAP SERPL CALCULATED.3IONS-SCNC: 14 MMOL/L (ref 7–15)
AST SERPL W P-5'-P-CCNC: 80 U/L (ref 10–35)
BILIRUB SERPL-MCNC: 0.3 MG/DL
BUN SERPL-MCNC: 10.3 MG/DL (ref 6–20)
CALCIUM SERPL-MCNC: 9.8 MG/DL (ref 8.6–10)
CHLORIDE SERPL-SCNC: 97 MMOL/L (ref 98–107)
CREAT SERPL-MCNC: 0.64 MG/DL (ref 0.51–0.95)
DEPRECATED HCO3 PLAS-SCNC: 22 MMOL/L (ref 22–29)
GFR SERPL CREATININE-BSD FRML MDRD: >90 ML/MIN/1.73M2
GLUCOSE SERPL-MCNC: 202 MG/DL (ref 70–99)
MAGNESIUM SERPL-MCNC: 1.8 MG/DL (ref 1.7–2.3)
PHOSPHATE SERPL-MCNC: 3.7 MG/DL (ref 2.5–4.5)
POTASSIUM SERPL-SCNC: 4.4 MMOL/L (ref 3.4–5.3)
PROT SERPL-MCNC: 8.2 G/DL (ref 6.4–8.3)
SODIUM SERPL-SCNC: 133 MMOL/L (ref 136–145)
T4 FREE SERPL-MCNC: 0.36 NG/DL (ref 0.9–1.7)
TSH SERPL DL<=0.005 MIU/L-ACNC: 58.32 UIU/ML (ref 0.3–4.2)

## 2022-08-04 PROCEDURE — 96374 THER/PROPH/DIAG INJ IV PUSH: CPT

## 2022-08-04 PROCEDURE — 99284 EMERGENCY DEPT VISIT MOD MDM: CPT | Mod: 25

## 2022-08-04 PROCEDURE — 250N000011 HC RX IP 250 OP 636: Performed by: EMERGENCY MEDICINE

## 2022-08-04 PROCEDURE — 36415 COLL VENOUS BLD VENIPUNCTURE: CPT | Performed by: EMERGENCY MEDICINE

## 2022-08-04 PROCEDURE — 84100 ASSAY OF PHOSPHORUS: CPT | Performed by: EMERGENCY MEDICINE

## 2022-08-04 PROCEDURE — 84439 ASSAY OF FREE THYROXINE: CPT | Performed by: EMERGENCY MEDICINE

## 2022-08-04 PROCEDURE — 83735 ASSAY OF MAGNESIUM: CPT | Performed by: EMERGENCY MEDICINE

## 2022-08-04 PROCEDURE — 80061 LIPID PANEL: CPT

## 2022-08-04 PROCEDURE — 80053 COMPREHEN METABOLIC PANEL: CPT | Performed by: EMERGENCY MEDICINE

## 2022-08-04 PROCEDURE — 84443 ASSAY THYROID STIM HORMONE: CPT | Performed by: EMERGENCY MEDICINE

## 2022-08-04 PROCEDURE — 82040 ASSAY OF SERUM ALBUMIN: CPT | Performed by: EMERGENCY MEDICINE

## 2022-08-04 RX ORDER — LEVOTHYROXINE SODIUM 88 UG/1
88 TABLET ORAL DAILY
Qty: 30 TABLET | Refills: 0 | Status: ON HOLD | OUTPATIENT
Start: 2022-08-04 | End: 2022-08-24

## 2022-08-04 RX ORDER — ONDANSETRON 2 MG/ML
4 INJECTION INTRAMUSCULAR; INTRAVENOUS ONCE
Status: COMPLETED | OUTPATIENT
Start: 2022-08-04 | End: 2022-08-04

## 2022-08-04 RX ORDER — DOXYCYCLINE 100 MG/1
CAPSULE ORAL
Qty: 30 CAPSULE | Refills: 0 | Status: SHIPPED | OUTPATIENT
Start: 2022-08-04 | End: 2022-08-08

## 2022-08-04 RX ADMIN — ONDANSETRON 4 MG: 2 INJECTION INTRAMUSCULAR; INTRAVENOUS at 07:44

## 2022-08-04 ASSESSMENT — ENCOUNTER SYMPTOMS
NUMBNESS: 1
NAUSEA: 1
SLEEP DISTURBANCE: 1

## 2022-08-04 NOTE — ED PROVIDER NOTES
History   Chief Complaint:  Spasms       HPI   Sherri Lynn is a 36 year old female with history of hyperlipidemia, chron's disease, inflammatory arthritis, and hidradenitis who presents with muscle twitching throughout her entire body. The twitching is not painful, but it is severe enough to disrupt her sleeping. On July 7th she had soft tissue removal surgery for her hidradenitis. Four days ago she had the onset of twitching in her left arm, and there was concern that this was related to her surgery. A soft tissue ultra sound was done on 7/31 which revealed a 6 mm abscess in her area of discomfort. She followed up with her surgeon regarding this, and he believed it to be normal healing. Two days ago, she presented to urgent care after her symptoms persisted and had the labs done as seen below. Upon evaluation the twitching has spread to her arms, chest, back, thigh, calf, and the bottoms of her feet; this bothers her the most. This morning she was met with the onset of nausea which has prevented her from eating. She has never been known to have any hypothyroidism or have these abnormal labs. She has never seen endocrinologist. She has an appointment with her primary care doctor in four days.        8/2/2022 lab workup   Glucose: 302 (H)  TSH with free T4 reflex: 33.97 (H)  CRP inflammation: 7.44 (H)  T4 free: 0.40 (L)      Review of Systems   Gastrointestinal: Positive for nausea.   Neurological: Positive for numbness (twitching ).   Psychiatric/Behavioral: Positive for sleep disturbance.   All other systems reviewed and are negative.    Allergies:  Adhesive Tape  Augmentin  Azathioprine  Azathioprine  Certolizumab Pegol  Flu Virus Vaccine  Humira  Keflex  Peanut-Derived  Pneumococcal Vaccines  Sulfa Drugs  Vedolizumab  Zithromax   Clindamycin  Penicillins    Medications:  Albuterol   Escitalopram   Etonogestrel   hydroxychloroquine   Montelukast   Bupropion   Prednisone     Past Medical History:     Crohn's  disease   Carpal tunnel syndrome   Intermittent asthma   Inflammatory arthritis   Major depressive disorder   Hidradenitis suppurativa   Iron deficiency anemia   Ovarian cyst   Hyperlipidemia   Regional enteritis of small intestine   Gastritis   Pancreatitis     Past Surgical History:    Carpal tunnel release   [place] teeth extraction      Family History:    Mother: asthma, hypertension  Sister: asthma   Brother: asthma   Father: hypertension, testicular cancer    Social History:  The patient presents to the ED with family.   Employment: nurse    Physical Exam     Patient Vitals for the past 24 hrs:   BP Temp Temp src Pulse Resp SpO2   08/04/22 0634 134/87 98.3  F (36.8  C) Temporal 98 18 95 %       Physical Exam  General: Patient is alert and cooperative.  Well appearing.   HENT:  Normal appearance.   Eyes: EOMI. Normal conjunctiva.  Neck:  Normal range of motion and appearance.   Cardiovascular:  Normal rate.   Pulmonary/Chest:  Effort normal.   Musculoskeletal: Normal range of motion. No edema or tenderness.   Neurological: oriented, normal strength, sensation, and coordination.   Skin: Warm and dry. No rash or bruising.   Psychiatric: Normal mood and affect. Normal behavior and judgement.      Emergency Department Course     Laboratory:  Labs Ordered and Resulted from Time of ED Arrival to Time of ED Departure   COMPREHENSIVE METABOLIC PANEL - Abnormal       Result Value    Sodium 133 (*)     Potassium 4.4      Creatinine 0.64      Urea Nitrogen 10.3      Chloride 97 (*)     Carbon Dioxide (CO2) 22      Anion Gap 14      Glucose 202 (*)     Calcium 9.8      Protein Total 8.2      Albumin 4.7      Bilirubin Total 0.3      Alkaline Phosphatase 84      AST 80 (*)      (*)     GFR Estimate >90     TSH WITH FREE T4 REFLEX - Abnormal    TSH 58.32 (*)    MAGNESIUM - Normal    Magnesium 1.8     PHOSPHORUS - Normal    Phosphorus 3.7     T4 FREE          Emergency Department Course:       Reviewed:  I reviewed  nursing notes, vitals, past medical history and Care Everywhere    Assessments:  0746 I obtained history and examined the patient as noted above.   848 I rechecked the patient and explained findings. We discussed reaching out to her primary clinic.   903 I discussed conversations with patient's on-call PCP.    Consults:  859 I consulted the on-call for patient's PCP Dr. Funes regarding patient presentation and elevated TSH and low free T4.    Interventions:  Medications   ondansetron (ZOFRAN) injection 4 mg (4 mg Intravenous Given 22 0744)     Disposition:  The patient was discharged to home.     Impression & Plan     Medical Decision Makin year old female with complaints of intermittent muscle twitching, initially confined to LUE, now x 4 extremities.  No associated motor weakness, neck pain, headache, or other specific symptoms.  See HPI for details.   Outpatient labs  notable for TSH 33.97, free T4 0.4. Repeat labs today: TSH 58.32, free T4 in process. No previous history thyroid disease.  Contacted primary care clinic; she has previously arranged follow up appointment next week.  Plan to start levothyroxine 88 mcg/d, additional labs/treatment through clinic as needed.      Diagnosis:    ICD-10-CM    1. Hypothyroidism, unspecified type  E03.9        Discharge Medications:  Discharge Medication List as of 2022  9:14 AM      START taking these medications    Details   levothyroxine (SYNTHROID/LEVOTHROID) 88 MCG tablet Take 1 tablet (88 mcg) by mouth daily for 30 days, Disp-30 tablet, R-0, E-Prescribe             Scribe Disclosure:  TWIN, Joshua Kjer, am serving as a scribe at 7:46 AM on 2022 to document services personally performed by Osmin Ocampo MD based on my observations and the provider's statements to me.            Osmin Ocampo MD  22 [id number]

## 2022-08-04 NOTE — TELEPHONE ENCOUNTER
No sooner appointments available.  Pt on wait list and has follow up scheduled with PCP.     Ruth PAYAN RN, BSN  [facility]

## 2022-08-04 NOTE — TELEPHONE ENCOUNTER
[facility] doctor called us regarding new dx of hypothyroidism. Last month had L axillary hydradenitis removed and later developed spasm of let upper extremity. Presented to the ER for reassurance and plastics surgery follow up.    Then has developed more full body muscle twitchiness.   Labs at ER showed TSH 58 and free T4 pending but apparently also had some urgent care labs yesterday showing low T4.     We discussed plan to start 88mcg levothyroxine (BMI 33)  And she will keep scheduled appt with Dr. Carlson next week to discuss monitoring/follow up plan.

## 2022-08-04 NOTE — ED TRIAGE NOTES
Here for concern of muscle spasm started yesterday and getting worse associated with nausea and hot/cold sensation. Stated has had mild muscle cramps and numbness to left arm and was seen here on Sunday night. Seen at urgent care on Tuesday, had blood test. Stated muscle spasm is worsening. ABCs intact.     Triage Assessment     Row Name 08/04/22 0632       Triage Assessment (Adult)    Airway WDL WDL       Respiratory WDL    Respiratory WDL WDL       Cardiac WDL    Cardiac WDL WDL

## 2022-08-04 NOTE — TELEPHONE ENCOUNTER
"Routing refill request to provider for review/approval because:  Drug not on the [facility] refill protocol and \"Patient not taking\" Reported on 8/2/2022.      Last Written Prescription Date: 6/9/2022  Last Fill Quantity: 30,  # refills: 0   Last office visit provider:  6/9/2022 with PCP Dr RUFINO Carlson     Requested Prescriptions   Pending Prescriptions Disp Refills     doxycycline monohydrate (MONODOX) 100 MG capsule [Pharmacy Med Name: DOXYCYCLINE MONO 100 MG CAP] 30 capsule 0     Sig: TAKE 1 CAPSULE BY MOUTH EVERY DAY       There is no refill protocol information for this order          Debo Waters RN 08/03/22 9:29 PM  "

## 2022-08-05 ENCOUNTER — PATIENT OUTREACH (OUTPATIENT)
Dept: CARE COORDINATION | Facility: CLINIC | Age: 37
End: 2022-08-05

## 2022-08-05 DIAGNOSIS — Z71.89 OTHER SPECIFIED COUNSELING: ICD-10-CM

## 2022-08-05 NOTE — PROGRESS NOTES
Clinic Care Coordination Contact  [facility]: Post-Discharge Note  SITUATION                                                      Admission:    Admission Date: 08/04/22   Reason for Admission: Spasms  Discharge:   Discharge Date: 08/04/22  Discharge Diagnosis: Spasms    BACKGROUND                                                      Per hospital discharge summary and inpatient provider notes:    Sherri Lynn is a 36 year old female with history of hyperlipidemia, chron's disease, inflammatory arthritis, and hidradenitis who presents with muscle twitching throughout her entire body. The twitching is not painful, but it is severe enough to disrupt her sleeping. On July 7th she had soft tissue removal surgery for her hidradenitis. Four days ago she had the onset of twitching in her left arm, and there was concern that this was related to her surgery. A soft tissue ultra sound was done on 7/31 which revealed a 6 mm abscess in her area of discomfort. She followed up with her surgeon regarding this, and he believed it to be normal healing. Two days ago, she presented to urgent care after her symptoms persisted and had the labs done as seen below. Upon evaluation the twitching has spread to her arms, chest, back, thigh, calf, and the bottoms of her feet; this bothers her the most. This morning she was met with the onset of nausea which has prevented her from eating. She has never been known to have any hypothyroidism or have these abnormal labs. She has never seen endocrinologist. She has an appointment with her primary care doctor in four days.     ASSESSMENT      Enrollment  Primary Care Care Coordination Status: Declined    Discharge Assessment  How are you doing now that you are home?: I am feeling better.  How are your symptoms? (Red Flag symptoms escalate to triage hotline per guidelines): Improved  Do you feel your condition is stable enough to be safe at home until your provider visit?: Yes  Does the  patient have their discharge instructions? : Yes  Does the patient have questions regarding their discharge instructions? : No  Were you started on any new medications or were there changes to any of your previous medications? : Yes  Does the patient have all of their medications?: Yes  Do you have questions regarding any of your medications? : No  Discharge follow-up appointment scheduled within 14 calendar days? : Yes  Discharge Follow Up Appointment Date: 08/08/22  Discharge Follow Up Appointment Scheduled with?: Primary Care Provider                  PLAN                                                      Outpatient Plan: ED/Hospital Follow Up with  Megan Carlson MD  Monday August 8 10:50 AM    Future Appointments   Date Time Provider Department Center   8/8/2022 11:10 AM Megan Carlson MD [facility]   9/27/2022  2:30 PM Shantell Garnica MD [facility]   11/16/2022  9:00 AM Sheila Rolon [facility]   12/29/2022  9:00 AM Omi Mg MD [facility]         For any urgent concerns, please contact our 24 hour nurse triage line: [phone] ([phone])       RONEN Laws  [phone]  [facility]

## 2022-08-08 ENCOUNTER — OFFICE VISIT (OUTPATIENT)
Dept: FAMILY MEDICINE | Facility: CLINIC | Age: 37
End: 2022-08-08
Payer: COMMERCIAL

## 2022-08-08 ENCOUNTER — TELEPHONE (OUTPATIENT)
Dept: FAMILY MEDICINE | Facility: CLINIC | Age: 37
End: 2022-08-08

## 2022-08-08 VITALS
DIASTOLIC BLOOD PRESSURE: 90 MMHG | BODY MASS INDEX: 36.69 KG/M2 | SYSTOLIC BLOOD PRESSURE: 130 MMHG | HEART RATE: 96 BPM | WEIGHT: 207.1 LBS

## 2022-08-08 DIAGNOSIS — M25.532 LEFT WRIST PAIN: ICD-10-CM

## 2022-08-08 DIAGNOSIS — E11.9 DIABETES MELLITUS TREATED WITH ORAL MEDICATION (H): ICD-10-CM

## 2022-08-08 DIAGNOSIS — E66.01 MORBID OBESITY (H): ICD-10-CM

## 2022-08-08 DIAGNOSIS — R73.9 ELEVATED BLOOD SUGAR LEVEL: ICD-10-CM

## 2022-08-08 DIAGNOSIS — M79.604 PAIN OF RIGHT LOWER EXTREMITY: ICD-10-CM

## 2022-08-08 DIAGNOSIS — F32.2 CURRENT SEVERE EPISODE OF MAJOR DEPRESSIVE DISORDER WITHOUT PSYCHOTIC FEATURES WITHOUT PRIOR EPISODE (H): ICD-10-CM

## 2022-08-08 DIAGNOSIS — E03.9 ACQUIRED HYPOTHYROIDISM: ICD-10-CM

## 2022-08-08 DIAGNOSIS — Z98.890 HISTORY OF CARPAL TUNNEL RELEASE: ICD-10-CM

## 2022-08-08 DIAGNOSIS — Z79.84 DIABETES MELLITUS TREATED WITH ORAL MEDICATION (H): ICD-10-CM

## 2022-08-08 DIAGNOSIS — E11.9 DIABETES MELLITUS TREATED WITH ORAL MEDICATION (H): Primary | ICD-10-CM

## 2022-08-08 DIAGNOSIS — Z09 ENCOUNTER FOR EXAMINATION FOLLOWING TREATMENT AT HOSPITAL: Primary | ICD-10-CM

## 2022-08-08 DIAGNOSIS — E78.5 HYPERLIPIDEMIA LDL GOAL <160: ICD-10-CM

## 2022-08-08 DIAGNOSIS — Z79.84 DIABETES MELLITUS TREATED WITH ORAL MEDICATION (H): Primary | ICD-10-CM

## 2022-08-08 LAB
CHOLEST SERPL-MCNC: 380 MG/DL
HBA1C MFR BLD: 8 % (ref 0–5.6)
HDLC SERPL-MCNC: 31 MG/DL
LDLC SERPL CALC-MCNC: ABNORMAL MG/DL
NONHDLC SERPL-MCNC: 349 MG/DL
TRIGL SERPL-MCNC: 720 MG/DL

## 2022-08-08 PROCEDURE — 99215 OFFICE O/P EST HI 40 MIN: CPT | Performed by: FAMILY MEDICINE

## 2022-08-08 PROCEDURE — 96127 BRIEF EMOTIONAL/BEHAV ASSMT: CPT | Performed by: FAMILY MEDICINE

## 2022-08-08 RX ORDER — METFORMIN HCL 500 MG
2000 TABLET, EXTENDED RELEASE 24 HR ORAL
Qty: 360 TABLET | Refills: 4 | Status: SHIPPED | OUTPATIENT
Start: 2022-08-08 | End: 2022-11-06

## 2022-08-08 RX ORDER — LEVOTHYROXINE SODIUM 112 UG/1
112 TABLET ORAL DAILY
Qty: 90 TABLET | Refills: 1 | Status: SHIPPED | OUTPATIENT
Start: 2022-08-08 | End: 2023-01-24

## 2022-08-08 ASSESSMENT — PATIENT HEALTH QUESTIONNAIRE - PHQ9
SUM OF ALL RESPONSES TO PHQ QUESTIONS 1-9: 16
SUM OF ALL RESPONSES TO PHQ QUESTIONS 1-9: 16
10. IF YOU CHECKED OFF ANY PROBLEMS, HOW DIFFICULT HAVE THESE PROBLEMS MADE IT FOR YOU TO DO YOUR WORK, TAKE CARE OF THINGS AT HOME, OR GET ALONG WITH OTHER PEOPLE: VERY DIFFICULT

## 2022-08-08 NOTE — TELEPHONE ENCOUNTER
New Medication Request    Contacts       Type Contact Phone/Fax    08/08/2022 01:27 PM CDT Phone (Incoming) Sherri Lynn (Self) [phone] (H)          What medication are you requesting?: Glucose meter    Reason for medication request: Pt requesting a glucose meter. Pt states she doesn't need to see a diabetic education. Pt states she knows how to check her own blood glucose.      Have you taken this medication before?: Uknown    Controlled Substance Agreement on file:   CSA -- Patient Level:    CSA: None found at the patient level.         Patient offered an appointment? No    Preferred Pharmacy:   [facility]/pharm[facility] #[id number] - [place], [place] - [facility] [address] [facility] [place] [address]  [place] [address]  Phone: [phone] Fax: [phone]    Could we send this information to you in [employer] or would you prefer to receive a phone call?:   Patient would prefer a phone call   Okay to leave a detailed message?: Yes at Home number on file [phone] (home)

## 2022-08-08 NOTE — PROGRESS NOTES
Answers for HPI/ROS submitted by the patient on 8/8/2022  If you checked off any problems, how difficult have these problems made it for you to do your work, take care of things at home, or get along with other people?: Very difficult  PHQ9 TOTAL SCORE: 16      Assessment & Plan     Patient presents with:  Follow Up: UC & [facility] - TSH is high - started on meds / blood glucose levels were also elevated at that time        Sherri was seen today for follow up.    Diagnoses and all orders for this visit:    Encounter for examination following treatment at hospital    Left wrist pain  We will wait with the new adjusted dose of Synthroid if that will help improve her symptoms.  EMG also ordered in case returning of carpal tunnel syndrome       EMG; Future    Acquired hypothyroidism  -     levothyroxine (SYNTHROID/LEVOTHROID) 112 MCG tablet; Take 1 tablet (112 mcg) by mouth daily  -     TSH with free T4 reflex; Future  -     T4, free; Future    Current severe episode of major depressive disorder without psychotic features without prior episode (H)  Stable on Lexapro  Morbid obesity (H)    Pain of right lower extremity  Again hoping adjusted dose of Synthroid will help some of the symptoms.    Elevated blood sugar level: new DX of diabetes mellitus today   -     Hemoglobin A1c; Future    Lab Results   Component Value Date    A1C 8.0 08/02/2022       Hyperlipidemia LDL goal <160  -     Lipid Profile; Future    History of carpal tunnel release  Comments:  feb 2014  Orders:  -     EMG; Future      Diabetes mellitus treated with oral medication (H)  Comments:  New Dx at A1c of 8.0, start metformin to max dose to 2000mg daily. referral to [place], F/U 2 months as planned   Orders:  -     AMB Adult Diabetes Educator Referral; Future  -     metFORMIN (GLUCOPHAGE XR) 500 MG 24 hr tablet; Take 4 tablets (2,000 mg) by mouth daily (with dinner) for 90 days           BMI:   Estimated body mass index is 36.69 kg/m  as calculated from the  "following:    Height as of 7/31/22: 1.6 m (5' 3\").    Weight as of this encounter: 93.9 kg (207 lb 1.6 oz).   Weight management plan: Discussed healthy diet and exercise guidelines        Return in about 2 months (around 10/8/2022) for Routine preventive.      Subjective   Sherri Lynn 36 year old who presents for the following health issues : left arm numbness tingling ??post Op complications 7/7/22 she had soft tissue removal surgery for her hidradenitis.  Patient also status post left carpal tunnel release 2014 did not had any problems since then.  She started her Synthroid 88 mcg every day denies any side effect but she like to start medication based on her weight.  We will adjust the dose today and have her recheck her levels in next 2 months    Also concern for elevated blood sugars and cholesterol level patient like to get that checked today we will add A1c and lipid level to her labs which were done on August second    [facility]     ED/UC Followup:  HPI   Sherri Lynn is a 36 year old female with history of hyperlipidemia, chron's disease, inflammatory arthritis, and hidradenitis who presents with muscle twitching throughout her entire body. The twitching is not painful, but it is severe enough to disrupt her sleeping. On July 7th she had soft tissue removal surgery for her hidradenitis. Four days ago she had the onset of twitching in her left arm, and there was concern that this was related to her surgery. A soft tissue ultra sound was done on 7/31 which revealed a 6 mm abscess in her area of discomfort. She followed up with her surgeon regarding this, and he believed it to be normal healing. Two days ago, she presented to urgent care after her symptoms persisted and had the labs done as seen below. Upon evaluation the twitching has spread to her arms, chest, back, thigh, calf, and the bottoms of her feet; this bothers her the most. This morning she was met with the onset of nausea which has prevented her " from eating. She has never been known to have any hypothyroidism or have these abnormal labs. She has never seen endocrinologist. She has an appointment with her primary care doctor in four days.            Facility:  [facility] and urgent care   Reason for visit: 7/31- left arm numbness, 8/2 left arm numbness( Dx with hypothyroidism )  8/4- twitching all over the body, since then started on synthroid 88 mcg   Current Status: she feel numbness tingling little better but continue to have finger numbness of left hand. Both arm pits heeled well     Patient Active Problem List   Diagnosis     CARDIOVASCULAR SCREENING; LDL GOAL LESS THAN 160     Crohn's disease (H)     Carpal tunnel syndrome     Intermittent asthma     Administrative encounter     Lactose intolerance in adult     Inflammatory arthritis     Current severe episode of major depressive disorder without psychotic features without prior episode (H)     Hidradenitis suppurativa     Morbid obesity (H)     History of carpal tunnel release     Diabetes mellitus, type 2 (H)     Diabetes mellitus treated with oral medication (H)        Current Outpatient Medications   Medication     albuterol (PROAIR HFA/PROVENTIL HFA/VENTOLIN HFA) 108 (90 Base) MCG/ACT inhaler     diphenhydrAMINE HCl 12.5 MG CHEW     escitalopram (LEXAPRO) 20 MG tablet     etonogestrel (NEXPLANON) 68 MG IMPL     levothyroxine (SYNTHROID/LEVOTHROID) 112 MCG tablet     levothyroxine (SYNTHROID/LEVOTHROID) 88 MCG tablet     metFORMIN (GLUCOPHAGE XR) 500 MG 24 hr tablet     multivitamin w/minerals (THERA-VIT-M) tablet     Probiotic Product (PROBIOTIC-10 ULTIMATE) CAPS     No current facility-administered medications for this visit.          Social Determinants of Health     Tobacco Use: Low Risk      Smoking Tobacco Use: Never Smoker     Smokeless Tobacco Use: Never Used   Alcohol Use: Not on file   Financial Resource Strain: Not on file   Food Insecurity: Not on file   Transportation Needs: Not on  file   Physical Activity: Not on file   Stress: Not on file   Social Connections: Not on file   Intimate Partner Violence: Not on file   Depression: Not at risk     PHQ-2 Score: 1   Housing Stability: Not on file        Review of Systems   Constitutional, HEENT, cardiovascular, pulmonary, GI, , musculoskeletal, neuro, skin, endocrine and psych systems are negative, except as otherwise noted.      Objective    BP (!) 130/90 (BP Location: Left arm, Patient Position: Sitting, Cuff Size: Adult Large)   Pulse 96   Wt 93.9 kg (207 lb 1.6 oz)   LMP  (LMP Unknown)   Breastfeeding No   BMI 36.69 kg/m     LMP 06/01/2011   There is no height or weight on file to calculate BMI.  Physical Exam   GENERAL: healthy, alert and no distress  NECK: no adenopathy, no asymmetry, masses, or scars and thyroid normal to palpation  RESP: lungs clear to auscultation - no rales, rhonchi or wheezes  CV: regular rate and rhythm, normal S1 S2, no S3 or S4, no murmur, click or rub, no peripheral edema and peripheral pulses strong  ABDOMEN: soft, nontender, no hepatosplenomegaly, no masses and bowel sounds normal  MS: no gross musculoskeletal defects noted, no edema  SKIN: no suspicious lesions or rashes and skin is healing well from the recent armpit surgery    I spent 40 minutes with the patient, >50% of which was in counseling regarding the patient's medical issues as noted above.  Megan Carlson MD   [facility].  [phone]

## 2022-08-09 DIAGNOSIS — E11.9 DIABETES MELLITUS TREATED WITH ORAL MEDICATION (H): ICD-10-CM

## 2022-08-09 DIAGNOSIS — Z79.84 DIABETES MELLITUS TREATED WITH ORAL MEDICATION (H): ICD-10-CM

## 2022-08-09 RX ORDER — GLUCOSAMINE HCL/CHONDROITIN SU 500-400 MG
CAPSULE ORAL
Qty: 100 EACH | Refills: 3 | Status: SHIPPED | OUTPATIENT
Start: 2022-08-09

## 2022-08-09 RX ORDER — LANCETS
EACH MISCELLANEOUS
Qty: 100 EACH | Refills: 6 | Status: SHIPPED | OUTPATIENT
Start: 2022-08-09 | End: 2022-08-09

## 2022-08-10 RX ORDER — LANCETS
EACH MISCELLANEOUS
Qty: 100 EACH | Refills: 6 | Status: SHIPPED | OUTPATIENT
Start: 2022-08-10

## 2022-08-19 ENCOUNTER — VIRTUAL VISIT (OUTPATIENT)
Dept: EDUCATION SERVICES | Facility: CLINIC | Age: 37
End: 2022-08-19
Payer: COMMERCIAL

## 2022-08-19 ENCOUNTER — MYC MEDICAL ADVICE (OUTPATIENT)
Dept: EDUCATION SERVICES | Facility: CLINIC | Age: 37
End: 2022-08-19

## 2022-08-19 ENCOUNTER — TELEPHONE (OUTPATIENT)
Dept: EDUCATION SERVICES | Facility: CLINIC | Age: 37
End: 2022-08-19

## 2022-08-19 DIAGNOSIS — E11.9 DIABETES MELLITUS TREATED WITH ORAL MEDICATION (H): ICD-10-CM

## 2022-08-19 DIAGNOSIS — Z79.84 DIABETES MELLITUS TREATED WITH ORAL MEDICATION (H): Primary | ICD-10-CM

## 2022-08-19 DIAGNOSIS — E11.9 DIABETES MELLITUS TREATED WITH ORAL MEDICATION (H): Primary | ICD-10-CM

## 2022-08-19 DIAGNOSIS — Z79.84 DIABETES MELLITUS TREATED WITH ORAL MEDICATION (H): ICD-10-CM

## 2022-08-19 PROCEDURE — G0108 DIAB MANAGE TRN  PER INDIV: HCPCS | Mod: 95 | Performed by: DIETITIAN, REGISTERED

## 2022-08-19 NOTE — PROGRESS NOTES
"Diabetes Self-Management Education & Support    Presents for: Initial Assessment for new diagnosis    CDE VISIT MODE: The patient has been notified of following:     \"This video visit will be conducted via a call between you and your physician/provider. We have found that certain health care needs can be provided without the need for an in-person physical exam.  This service lets us provide the care you need with a video conversation.  If a prescription is necessary we can send it directly to your pharmacy.  If lab work is needed we can place an order for that and you can then stop by our lab to have the test done at a later time.    Video visits are billed at different rates depending on your insurance coverage.  Please reach out to your insurance provider with any questions.    If during the course of the call the physician/provider feels a video visit is not appropriate, you will not be charged for this service.\"    Patient has given verbal consent for Video visit? Yes  How would you like to obtain your AVS? MyChart  If you are dropped from the video visit, the video invite should be resent to: Text to cell phone: [phone]  Will anyone else be joining your video visit? No    Originating Location (pt. Location): Home  Distant Location (provider location): [facility]  Mode of Communication:  Video Conference via Proven    Video Start Time: 1:50 PM  Video End Time (time video stopped): 2:51 PM      Assessment Type:   REPORTS:                Education Materials Provided:  [facility] Healthy Living with Diabetes Book and My Plate Planner  Will mail to patient    ASSESSMENT:  BG are well controlled and improving from when A1C was drawn. FBS are improving as well. Patient has only worn CGM for the past 4 days, but BG appear to be well controlled with lifestyle changes.       PLAN  1. Continue metformin at 2,000 mg/day  2. Type 1 DM/ELLI labs ordered to determine type 1/ELLI or " "Type 2 - pt prefers and agree due to suspicion with other autoimmune diseases  3. Continue with lifestyle changes    Topics to cover at upcoming visits: Being Active, Monitoring, Taking Medication and Reducing Risks    Follow-up: 8 weeks    See Care Plan for co-developed, patient-state behavior change goals.  AVS provided for patient today.        SUBJECTIVE/OBJECTIVE:  Presents for: Initial Assessment for new diagnosis  Accompanied by: Self  Diabetes education in the past 24mo: No  Focus of Visit: CGM  Type of CGM visit: Personal CGM Follow-up  Diabetes type: Type 2  Date of diagnosis: August 2022  Disease course: Stable  How confident are you filling out medical forms by yourself:: Extremely  Diabetes management related comments/concerns: concerned that this could be ELLI due to aunt has type 1  Transportation concerns: No  Difficulty affording diabetes medication?: No  Difficulty affording diabetes testing supplies?: No  Other concerns:: None  Cultural Influences/Ethnic Background:  Not  or     Diabetes Symptoms & Complications:  Fatigue: Sometimes  Neuropathy: No  Polydipsia: No  Polyphagia: No  Polyuria: No  Visual change: No  Slow healing wounds: No  Symptom course: Stable       Patient Problem List and Family Medical History reviewed for relevant medical history, current medical status, and diabetes risk factors.    Vitals:  LMP  (LMP Unknown)   Estimated body mass index is 36.69 kg/m  as calculated from the following:    Height as of 7/31/22: 1.6 m (5' 3\").    Weight as of 8/8/22: 93.9 kg (207 lb 1.6 oz).   Last 3 BP:   BP Readings from Last 3 Encounters:   08/08/22 (!) 130/90   08/04/22 134/87   08/02/22 114/76       History   Smoking Status     Never Smoker   Smokeless Tobacco     Never Used       Labs:  Lab Results   Component Value Date    A1C 8.0 08/02/2022     Lab Results   Component Value Date     08/04/2022     08/02/2022    GLC 88 10/15/2020     Lab Results   Component " Value Date    LDL  08/04/2022      Comment:      Cannot estimate LDL when triglyceride exceeds 400 mg/dL     Direct Measure HDL   Date Value Ref Range Status   08/04/2022 31 (L) >=50 mg/dL Final   ]  GFR Estimate   Date Value Ref Range Status   08/04/2022 >90 >60 mL/min/1.73m2 Final     Comment:     Effective December 21, 2021 eGFRcr in adults is calculated using the 2021 CKD-EPI creatinine equation which includes age and gender (Chantale et al., [place], DOI: 10.1056/YWJMke5419998)   10/15/2020 >90 >60 mL/min/[1.73_m2] Final     Comment:     Non  GFR Calc  Starting 12/18/2018, serum creatinine based estimated GFR (eGFR) will be   calculated using the Chronic Kidney Disease Epidemiology Collaboration   (CKD-EPI) equation.       GFR Estimate If Black   Date Value Ref Range Status   10/15/2020 >90 >60 mL/min/[1.73_m2] Final     Comment:      GFR Calc  Starting 12/18/2018, serum creatinine based estimated GFR (eGFR) will be   calculated using the Chronic Kidney Disease Epidemiology Collaboration   (CKD-EPI) equation.       Lab Results   Component Value Date    CR 0.64 08/04/2022    CR 0.79 10/15/2020     No results found for: MICROALBUMIN    Healthy Eating:  Healthy Eating Assessed Today: Yes  Cultural/Gnosticist diet restrictions?: No  How many times a week on average do you eat food made away from home (restaurant/take-out)?: 5+  Meals include: Breakfast, Lunch, Dinner  Breakfast: Ensure pea protein based, lower sugar (plant based) - chocolate  Lunch: soup (chicken noodle soup) OR salad/panera lemon poppyseed with fruit salad. OR salads from agriculture (edemamme and carrots with chicken)  Dinner: order out - more salads -  Snacks: yogurt blueberries and almonds HS snack; diabetic snacks; carrots and hummus, apples, peanut butter, yogurt cups (nuts or hemp seeds in it), popcorn, baby bell cheese and string cheese with fruit.  Other: pt has Crohns disease - tries to be mindfully eating - not  eat in front of the screen.  Beverages: Water  Has patient met with a dietitian in the past?: No    Being Active:  Being Active Assessed Today: Yes  Exercise:: Yes (8,000 steps a day)  Days per week of moderate to strenuous exercise (like a brisk walk): 7  On average, minutes per day of exercise at this level: 30 (walks the dogs twice a day)  How intense was your typical exercise? : Moderate (like brisk walking)  Exercise Minutes per Week: 210    Monitoring:  Monitoring Assessed Today: Yes  Did patient bring glucose meter to appointment? : Yes  Blood Glucose Meter: Accu-chek, CGM (ROIÂ²)  Times checking blood sugar at home (number): 4  Times checking blood sugar at home (per): Day  Blood glucose trend: Decreasing    See reports for BG from CGM    Taking Medications:  Diabetes Medication(s)     Biguanides       metFORMIN (GLUCOPHAGE XR) 500 MG 24 hr tablet    Take 4 tablets (2,000 mg) by mouth daily (with dinner) for 90 days          Taking Medication Assessed Today: Yes  Current Treatments: Oral Medication (taken by mouth)  Problems taking diabetes medications regularly?: Yes  Diabetes medication side effects?: No    Problem Solving:  Problem Solving Assessed Today: Yes  Is the patient at risk for hypoglycemia?: No  Is the patient at risk for DKA?: No  Does patient have severe weather/disaster plan for diabetes management?: No  Does patient have sick day plan for diabetes management?: No              Reducing Risks:  Reducing Risks Assessed Today: Yes  Has dilated eye exam at least once a year?: No (2020 year)  Sees dentist every 6 months?: No    Healthy Coping:  Healthy Coping Assessed Today: Yes  Emotional response to diabetes: Ready to learn, Acceptance  Informal Support system:: Family  Stage of change: ACTION (Actively working towards change)  Support resources: None  Patient Activation Measure Survey Score:  No flowsheet data found.      Care Plan and Education Provided:  Care Plan: Diabetes   Updates made by  Liset Goins RD since 8/19/2022 12:00 AM      Problem: HbA1C Not In Goal       Goal: Establish Regular Follow-Ups with PCP    This Visit's Progress: 100%      Task: Discuss with PCP the recommended timing for patient's next follow up visit(s) Completed 8/19/2022   Responsible User: Liset Goins RD      Task: Discuss schedule for PCP visits with patient    Responsible User: Liset Goins RD      Goal: Get HbA1C Level in Goal    This Visit's Progress: 0%      Task: Educate patient on diabetes education self-management topics Completed 8/19/2022   Responsible User: Liset Goins RD      Task: Educate patient on benefits of regular glucose monitoring Completed 8/19/2022   Responsible User: Liset Goins RD      Task: Refer patient to appropriate extended care team member, as needed (Medication Therapy Management, Behavioral Health, Physical Therapy, etc.)    Responsible User: Liset Goins RD      Task: Discuss diabetes treatment plan with patient    Responsible User: Liset Goins RD      Problem: Diabetes Self-Management Education Needed to Optimize Self-Care Behaviors       Goal: Understand diabetes pathophysiology and disease progression    This Visit's Progress: 100%      Task: Provide education on diabetes pathophysiology and disease progression specfic to patient's diabetes type Completed 8/19/2022   Responsible User: Liset Goins RD      Goal: Healthy Eating - follow a healthy eating pattern for diabetes    This Visit's Progress: 100%      Task: Provide education on portion control and consistency in amount, composition and timing of food intake Completed 8/19/2022   Responsible User: Liset Goins RD      Task: Provide education on managing carbohydrate intake (carbohydrate counting, plate planning method, etc.) Completed 8/19/2022   Responsible User: Liset Goins RD      Task: Provide education on weight management    Responsible User:  Liset Goins RD      Task: Provide education on heart healthy eating    Responsible User: Liset Goins RD      Task: Provide education on eating out Completed 8/19/2022   Responsible User: Liset Goins RD      Task: Develop individualized healthy eating plan with patient    Responsible User: Liset Goins RD      Goal: Being Active - get regular physical activity, working up to at least 150 minutes per week    This Visit's Progress: 100%      Task: Provide education on relationship of activity to glucose and precautions to take if at risk for low glucose Completed 8/19/2022   Responsible User: Liset Goins RD      Task: Discuss barriers to physical activity with patient    Responsible User: Liset Goins RD      Task: Develop physical activity plan with patient    Responsible User: Liset Goins RD      Task: Explore community resources including walking groups, assistance programs, and home videos    Responsible User: Liset Goins RD      Goal: Monitoring - monitor glucose and ketones as directed    This Visit's Progress: 100%   Note:    Patient is using Steffany 2 CGM     Task: Provide education on blood glucose monitoring (purpose, proper technique, frequency, glucose targets, interpreting results, when to use glucose control solution, sharps disposal) Completed 8/19/2022   Responsible User: Liset Goins RD      Task: Provide education on continuous glucose monitoring (sensor placement, use of marshall or /reader, understanding glucose trends, alerts and alarms, differences between sensor glucose and blood glucose) Completed 8/19/2022   Responsible User: Liset Goins RD      Task: Provide education on ketone monitoring (when to monitor, frequency, etc.)    Responsible User: Liset Goins RD      Goal: Taking Medication - patient is consistently taking medications as directed    This Visit's Progress: 100%   Note:    Titrating up  on metformin     Task: Provide education on action of prescribed medication, including when to take and possible side effects Completed 8/19/2022   Responsible User: Liset Goins RD      Task: Provide education on insulin and injectable diabetes medications, including administration, storage, site selection and rotation for injection sites    Responsible User: Liset Goins RD      Task: Discuss barriers to medication adherence with patient and provide management technique ideas as appropriate    Responsible User: Liset Goins RD      Task: Provide education on frequency and refill details of medications    Responsible User: Liset Goins RD      Goal: Problem Solving - know how to prevent and manage short-term diabetes complications    This Visit's Progress: 100%   Note:    Reviewed hypoglycemia signs/symptoms and treatment     Task: Provide education on high blood glucose - causes, signs/symptoms, prevention and treatment    Responsible User: Liset Goins RD      Task: Provide education on low blood glucose - causes, signs/symptoms, prevention, treatment, carrying a carbohydrate source at all times, and medical identification Completed 8/19/2022   Responsible User: Liset Goins RD      Task: Provide education on safe travel with diabetes    Responsible User: Liset Goins RD      Task: Provide education on how to care for diabetes on sick days    Responsible User: Liset Goins RD      Task: Provide education on when to call a health care provider    Responsible User: Liset Goins RD      Goal: Reducing Risks - know how to prevent and treat long-term diabetes complications    This Visit's Progress: 0%   Note:    Due for eye exam and dental visit. Patient plans to schedule     Task: Provide education on major complications of diabetes, prevention, early diagnostic measures and treatment of complications Completed 8/19/2022   Responsible User:  Liset Goins RD      Task: Provide education on recommended care for dental, eye and foot health Completed 8/19/2022   Responsible User: Liset Goins RD      Task: Provide education on Hemoglobin A1c - goals and relationship to blood glucose levels Completed 8/19/2022   Responsible User: Liset Goins RD      Task: Provide education on recommendations for heart health - lipid levels and goals, blood pressure and goals, and aspirin therapy, if indicated    Responsible User: Liset Goins RD      Task: Provide education on tobacco cessation    Responsible User: Liset Goins RD      Goal: Healthy Coping - use available resources to cope with the challenges of managing diabetes    This Visit's Progress: 100%   Note:    Patient feels comfortable with new diagnosis - just suspicious of ELLI autoimmnue due to other autoimmune diseases being present     Task: Discuss recognizing feelings about having diabetes Completed 8/19/2022   Responsible User: Liset Goins RD      Task: Provide education on the benefits of making appropriate lifestyle changes Completed 8/19/2022      Task: Provide education on benefits of utilizing support systems    Responsible User: Liset Goins RD      Task: Discuss methods for coping with stress Completed 8/19/2022   Responsible User: Liset Goins RD      Task: Provide education on when to seek professional counseling    Responsible User: Liset Goins RD        Pt verbalized understanding of concepts discussed and recommendations provided today.       Time Spent: 61 minutes  Encounter Type: Individual    Any diabetes medication dose changes were made via the CDE Protocol per the patient's referring provider. A copy of this encounter was shared with the provider.

## 2022-08-19 NOTE — TELEPHONE ENCOUNTER
Dr. Carlson,    I ordered labs to determine if Sherri could have ELLI diabetes due to her history of other autoimmune diseases, I am suspicious.    She is asking that these be drawn as well.     Liset Goins RDN, LD, [facility]   Certified Diabetes Care &   [phone]

## 2022-08-19 NOTE — PATIENT INSTRUCTIONS
Goals for Diabetes Care:    1. Eat 3 balanced meals each day - Monitor carb intake and aim for 45-60 grams per meal  This would be equal to about 4 choices of carbohydrates. Carbohydrate 1 choice = 15 grams    Do not wait longer than 4-5 hours to eat something  Snacks limit to no more than 15-30 grams of carbohydrates or 1-2 choices  Make sure you include protein source with each meal and at bedtime - this has been shown to help with blood glucose elevations    2. Check blood sugars at least 1-3 times each day at varying times   Blood Glucose Targets:   1. Fasting and before meal target is 80 - 130   2. 2 hours after a meal target is < 180  Always remember to bring meter and log book to all appointments.    3. Activity really helps improve blood sugars. Try to Incorporate 30 minutes activity into each day - does not need to be all at one time & walking counts!    4. Treating low BG. Rule of 15 = BG 50-70 mg/dL = 15 gram carb (4 oz juice, 4 glucose tabs, OR 4 oz pop), then recheck BG in 15 minutes. If still low repeat. (If BG <50 mg/dL = 8 oz pop/juice or 8 glucose tabs).    5. Take diabetes medications as prescribed   -Increasing metformin as discussed to 2,000 mg/day    6. Labs for type 1 DM suspicion ordered. Cpeptide and fasting BG need to be fasting.    Follow up with your Diabetic Educator as needed to assess BG targets and need for modifications to medications and/or lifestyle.    Call with any questions.  Thank you!  Liset Goins RDN, LD, [facility]   Certified Diabetes Care &   Outpatient [facility]  Triage [phone]

## 2022-08-22 NOTE — TELEPHONE ENCOUNTER
I will place the order for screening for late onset type I diabetes  Advised patient to come back and do the lab only appointment    Megan Carlson MD

## 2022-08-23 ENCOUNTER — TELEPHONE (OUTPATIENT)
Dept: FAMILY MEDICINE | Facility: CLINIC | Age: 37
End: 2022-08-23

## 2022-08-23 ENCOUNTER — DOCUMENTATION ONLY (OUTPATIENT)
Dept: LAB | Facility: CLINIC | Age: 37
End: 2022-08-23

## 2022-08-23 ENCOUNTER — LAB (OUTPATIENT)
Dept: LAB | Facility: CLINIC | Age: 37
End: 2022-08-23
Payer: COMMERCIAL

## 2022-08-23 DIAGNOSIS — Z79.84 DIABETES MELLITUS TREATED WITH ORAL MEDICATION (H): Primary | ICD-10-CM

## 2022-08-23 DIAGNOSIS — E11.9 DIABETES MELLITUS TREATED WITH ORAL MEDICATION (H): Primary | ICD-10-CM

## 2022-08-23 DIAGNOSIS — Z79.84 DIABETES MELLITUS TREATED WITH ORAL MEDICATION (H): ICD-10-CM

## 2022-08-23 DIAGNOSIS — E11.9 DIABETES MELLITUS TREATED WITH ORAL MEDICATION (H): ICD-10-CM

## 2022-08-23 LAB
FASTING STATUS PATIENT QL REPORTED: YES
GLUCOSE SERPL-MCNC: 143 MG/DL (ref 70–99)
INSULIN SERPL-ACNC: 19 UU/ML (ref 2.6–24.9)

## 2022-08-23 PROCEDURE — 82947 ASSAY GLUCOSE BLOOD QUANT: CPT

## 2022-08-23 PROCEDURE — 36415 COLL VENOUS BLD VENIPUNCTURE: CPT

## 2022-08-23 PROCEDURE — 86341 ISLET CELL ANTIBODY: CPT | Mod: 90

## 2022-08-23 PROCEDURE — 83525 ASSAY OF INSULIN: CPT

## 2022-08-23 PROCEDURE — 99000 SPECIMEN HANDLING OFFICE-LAB: CPT

## 2022-08-23 PROCEDURE — 84681 ASSAY OF C-PEPTIDE: CPT

## 2022-08-23 ASSESSMENT — PATIENT HEALTH QUESTIONNAIRE - PHQ9
SUM OF ALL RESPONSES TO PHQ QUESTIONS 1-9: 11
SUM OF ALL RESPONSES TO PHQ QUESTIONS 1-9: 11
10. IF YOU CHECKED OFF ANY PROBLEMS, HOW DIFFICULT HAVE THESE PROBLEMS MADE IT FOR YOU TO DO YOUR WORK, TAKE CARE OF THINGS AT HOME, OR GET ALONG WITH OTHER PEOPLE: SOMEWHAT DIFFICULT

## 2022-08-23 ASSESSMENT — ANXIETY QUESTIONNAIRES
7. FEELING AFRAID AS IF SOMETHING AWFUL MIGHT HAPPEN: NEARLY EVERY DAY
3. WORRYING TOO MUCH ABOUT DIFFERENT THINGS: MORE THAN HALF THE DAYS
6. BECOMING EASILY ANNOYED OR IRRITABLE: SEVERAL DAYS
GAD7 TOTAL SCORE: 9
5. BEING SO RESTLESS THAT IT IS HARD TO SIT STILL: SEVERAL DAYS
2. NOT BEING ABLE TO STOP OR CONTROL WORRYING: NOT AT ALL
4. TROUBLE RELAXING: SEVERAL DAYS
GAD7 TOTAL SCORE: 9
8. IF YOU CHECKED OFF ANY PROBLEMS, HOW DIFFICULT HAVE THESE MADE IT FOR YOU TO DO YOUR WORK, TAKE CARE OF THINGS AT HOME, OR GET ALONG WITH OTHER PEOPLE?: SOMEWHAT DIFFICULT
1. FEELING NERVOUS, ANXIOUS, OR ON EDGE: SEVERAL DAYS
IF YOU CHECKED OFF ANY PROBLEMS ON THIS QUESTIONNAIRE, HOW DIFFICULT HAVE THESE PROBLEMS MADE IT FOR YOU TO DO YOUR WORK, TAKE CARE OF THINGS AT HOME, OR GET ALONG WITH OTHER PEOPLE: SOMEWHAT DIFFICULT
GAD7 TOTAL SCORE: 9
7. FEELING AFRAID AS IF SOMETHING AWFUL MIGHT HAPPEN: NEARLY EVERY DAY

## 2022-08-23 NOTE — PROGRESS NOTES
Sherri had her blood drawn this morning and wanted to add an AST     If that is ok please order additional labs as add-on.    Thanks

## 2022-08-24 ENCOUNTER — APPOINTMENT (OUTPATIENT)
Dept: ULTRASOUND IMAGING | Facility: CLINIC | Age: 37
End: 2022-08-24
Attending: EMERGENCY MEDICINE
Payer: COMMERCIAL

## 2022-08-24 ENCOUNTER — HOSPITAL ENCOUNTER (OUTPATIENT)
Facility: CLINIC | Age: 37
Setting detail: OBSERVATION
Discharge: HOME | End: 2022-08-25
Attending: EMERGENCY MEDICINE | Admitting: HOSPITALIST
Payer: COMMERCIAL

## 2022-08-24 ENCOUNTER — APPOINTMENT (OUTPATIENT)
Dept: CT IMAGING | Facility: CLINIC | Age: 37
End: 2022-08-24
Attending: EMERGENCY MEDICINE
Payer: COMMERCIAL

## 2022-08-24 DIAGNOSIS — K50.00 CROHN'S DISEASE OF SMALL INTESTINE WITHOUT COMPLICATION (H): Primary | ICD-10-CM

## 2022-08-24 DIAGNOSIS — K76.0 HEPATIC STEATOSIS: ICD-10-CM

## 2022-08-24 DIAGNOSIS — Q78.2 BONY SCLEROSIS: ICD-10-CM

## 2022-08-24 DIAGNOSIS — K80.50 BILIARY COLIC: ICD-10-CM

## 2022-08-24 LAB
ALBUMIN SERPL BCG-MCNC: 4.7 G/DL (ref 3.5–5.2)
ALP SERPL-CCNC: 93 U/L (ref 35–104)
ALT SERPL W P-5'-P-CCNC: 159 U/L (ref 10–35)
ANION GAP SERPL CALCULATED.3IONS-SCNC: 12 MMOL/L (ref 7–15)
AST SERPL W P-5'-P-CCNC: 100 U/L (ref 10–35)
BILIRUB SERPL-MCNC: 0.2 MG/DL
BUN SERPL-MCNC: 7.9 MG/DL (ref 6–20)
C PEPTIDE SERPL-MCNC: 4.4 NG/ML (ref 0.9–6.9)
CALCIUM SERPL-MCNC: 10.1 MG/DL (ref 8.6–10)
CHLORIDE SERPL-SCNC: 101 MMOL/L (ref 98–107)
CREAT SERPL-MCNC: 0.66 MG/DL (ref 0.51–0.95)
DEPRECATED HCO3 PLAS-SCNC: 24 MMOL/L (ref 22–29)
ERYTHROCYTE [DISTWIDTH] IN BLOOD BY AUTOMATED COUNT: 11.8 % (ref 10–15)
GFR SERPL CREATININE-BSD FRML MDRD: >90 ML/MIN/1.73M2
GLUCOSE SERPL-MCNC: 172 MG/DL (ref 70–99)
HCG SERPL QL: NEGATIVE
HCT VFR BLD AUTO: 37.3 % (ref 35–47)
HGB BLD-MCNC: 12 G/DL (ref 11.7–15.7)
HOLD SPECIMEN: NORMAL
LIPASE SERPL-CCNC: 52 U/L (ref 13–60)
MCH RBC QN AUTO: 31.1 PG (ref 26.5–33)
MCHC RBC AUTO-ENTMCNC: 32.2 G/DL (ref 31.5–36.5)
MCV RBC AUTO: 97 FL (ref 78–100)
PANC ISLET CELL AB TITR SER: NORMAL {TITER}
PLATELET # BLD AUTO: 285 10E3/UL (ref 150–450)
POTASSIUM SERPL-SCNC: 3.9 MMOL/L (ref 3.4–5.3)
PROT SERPL-MCNC: 7.9 G/DL (ref 6.4–8.3)
RBC # BLD AUTO: 3.86 10E6/UL (ref 3.8–5.2)
SARS-COV-2 RNA RESP QL NAA+PROBE: NEGATIVE
SODIUM SERPL-SCNC: 137 MMOL/L (ref 136–145)
WBC # BLD AUTO: 9.1 10E3/UL (ref 4–11)

## 2022-08-24 PROCEDURE — 250N000011 HC RX IP 250 OP 636: Performed by: EMERGENCY MEDICINE

## 2022-08-24 PROCEDURE — 80053 COMPREHEN METABOLIC PANEL: CPT | Performed by: EMERGENCY MEDICINE

## 2022-08-24 PROCEDURE — 96375 TX/PRO/DX INJ NEW DRUG ADDON: CPT

## 2022-08-24 PROCEDURE — U0005 INFEC AGEN DETEC AMPLI PROBE: HCPCS | Performed by: EMERGENCY MEDICINE

## 2022-08-24 PROCEDURE — 74177 CT ABD & PELVIS W/CONTRAST: CPT

## 2022-08-24 PROCEDURE — 85027 COMPLETE CBC AUTOMATED: CPT | Performed by: PHYSICIAN ASSISTANT

## 2022-08-24 PROCEDURE — 250N000011 HC RX IP 250 OP 636: Performed by: PHYSICIAN ASSISTANT

## 2022-08-24 PROCEDURE — 99285 EMERGENCY DEPT VISIT HI MDM: CPT | Mod: 25

## 2022-08-24 PROCEDURE — 250N000013 HC RX MED GY IP 250 OP 250 PS 637: Performed by: PHYSICIAN ASSISTANT

## 2022-08-24 PROCEDURE — 36415 COLL VENOUS BLD VENIPUNCTURE: CPT | Performed by: EMERGENCY MEDICINE

## 2022-08-24 PROCEDURE — 96376 TX/PRO/DX INJ SAME DRUG ADON: CPT

## 2022-08-24 PROCEDURE — 96374 THER/PROPH/DIAG INJ IV PUSH: CPT | Mod: 59

## 2022-08-24 PROCEDURE — 84703 CHORIONIC GONADOTROPIN ASSAY: CPT | Performed by: EMERGENCY MEDICINE

## 2022-08-24 PROCEDURE — 76705 ECHO EXAM OF ABDOMEN: CPT

## 2022-08-24 PROCEDURE — 85027 COMPLETE CBC AUTOMATED: CPT | Performed by: EMERGENCY MEDICINE

## 2022-08-24 PROCEDURE — 258N000003 HC RX IP 258 OP 636: Performed by: EMERGENCY MEDICINE

## 2022-08-24 PROCEDURE — C9113 INJ PANTOPRAZOLE SODIUM, VIA: HCPCS | Performed by: PHYSICIAN ASSISTANT

## 2022-08-24 PROCEDURE — 83690 ASSAY OF LIPASE: CPT | Performed by: EMERGENCY MEDICINE

## 2022-08-24 PROCEDURE — G0378 HOSPITAL OBSERVATION PER HR: HCPCS

## 2022-08-24 PROCEDURE — 258N000003 HC RX IP 258 OP 636: Performed by: PHYSICIAN ASSISTANT

## 2022-08-24 PROCEDURE — 82040 ASSAY OF SERUM ALBUMIN: CPT | Performed by: EMERGENCY MEDICINE

## 2022-08-24 PROCEDURE — 99219 PR INITIAL OBSERVATION CARE,LEVEL II: CPT | Performed by: PHYSICIAN ASSISTANT

## 2022-08-24 PROCEDURE — 250N000009 HC RX 250: Performed by: EMERGENCY MEDICINE

## 2022-08-24 RX ORDER — NALOXONE HYDROCHLORIDE 0.4 MG/ML
0.2 INJECTION, SOLUTION INTRAMUSCULAR; INTRAVENOUS; SUBCUTANEOUS
Status: DISCONTINUED | OUTPATIENT
Start: 2022-08-24 | End: 2022-08-25 | Stop reason: HOSPADM

## 2022-08-24 RX ORDER — ACETAMINOPHEN 325 MG/1
650 TABLET ORAL EVERY 8 HOURS PRN
Status: DISCONTINUED | OUTPATIENT
Start: 2022-08-24 | End: 2022-08-25 | Stop reason: HOSPADM

## 2022-08-24 RX ORDER — MORPHINE SULFATE 4 MG/ML
4 INJECTION, SOLUTION INTRAMUSCULAR; INTRAVENOUS ONCE
Status: COMPLETED | OUTPATIENT
Start: 2022-08-24 | End: 2022-08-24

## 2022-08-24 RX ORDER — LIDOCAINE 40 MG/G
CREAM TOPICAL
Status: DISCONTINUED | OUTPATIENT
Start: 2022-08-24 | End: 2022-08-25 | Stop reason: HOSPADM

## 2022-08-24 RX ORDER — ONDANSETRON 2 MG/ML
4 INJECTION INTRAMUSCULAR; INTRAVENOUS EVERY 6 HOURS PRN
Status: DISCONTINUED | OUTPATIENT
Start: 2022-08-24 | End: 2022-08-25 | Stop reason: HOSPADM

## 2022-08-24 RX ORDER — NICOTINE POLACRILEX 4 MG
15-30 LOZENGE BUCCAL
Status: DISCONTINUED | OUTPATIENT
Start: 2022-08-24 | End: 2022-08-25 | Stop reason: HOSPADM

## 2022-08-24 RX ORDER — DEXTROSE MONOHYDRATE 25 G/50ML
25-50 INJECTION, SOLUTION INTRAVENOUS
Status: DISCONTINUED | OUTPATIENT
Start: 2022-08-24 | End: 2022-08-25 | Stop reason: HOSPADM

## 2022-08-24 RX ORDER — ONDANSETRON 2 MG/ML
4 INJECTION INTRAMUSCULAR; INTRAVENOUS ONCE
Status: COMPLETED | OUTPATIENT
Start: 2022-08-24 | End: 2022-08-24

## 2022-08-24 RX ORDER — ESCITALOPRAM OXALATE 20 MG/1
20 TABLET ORAL AT BEDTIME
Status: DISCONTINUED | OUTPATIENT
Start: 2022-08-24 | End: 2022-08-25 | Stop reason: HOSPADM

## 2022-08-24 RX ORDER — SODIUM CHLORIDE 9 MG/ML
INJECTION, SOLUTION INTRAVENOUS CONTINUOUS
Status: DISCONTINUED | OUTPATIENT
Start: 2022-08-24 | End: 2022-08-24

## 2022-08-24 RX ORDER — IOPAMIDOL 755 MG/ML
500 INJECTION, SOLUTION INTRAVASCULAR ONCE
Status: COMPLETED | OUTPATIENT
Start: 2022-08-24 | End: 2022-08-24

## 2022-08-24 RX ORDER — NALOXONE HYDROCHLORIDE 0.4 MG/ML
0.4 INJECTION, SOLUTION INTRAMUSCULAR; INTRAVENOUS; SUBCUTANEOUS
Status: DISCONTINUED | OUTPATIENT
Start: 2022-08-24 | End: 2022-08-25 | Stop reason: HOSPADM

## 2022-08-24 RX ORDER — ESCITALOPRAM OXALATE 20 MG/1
20 TABLET ORAL AT BEDTIME
Status: DISCONTINUED | OUTPATIENT
Start: 2022-08-25 | End: 2022-08-24

## 2022-08-24 RX ORDER — HYDROMORPHONE HYDROCHLORIDE 1 MG/ML
.3-.5 INJECTION, SOLUTION INTRAMUSCULAR; INTRAVENOUS; SUBCUTANEOUS
Status: DISCONTINUED | OUTPATIENT
Start: 2022-08-24 | End: 2022-08-25 | Stop reason: HOSPADM

## 2022-08-24 RX ORDER — SODIUM CHLORIDE 9 MG/ML
INJECTION, SOLUTION INTRAVENOUS CONTINUOUS
Status: ACTIVE | OUTPATIENT
Start: 2022-08-24 | End: 2022-08-25

## 2022-08-24 RX ORDER — LEVOTHYROXINE SODIUM 112 UG/1
112 TABLET ORAL DAILY
Status: DISCONTINUED | OUTPATIENT
Start: 2022-08-25 | End: 2022-08-25 | Stop reason: HOSPADM

## 2022-08-24 RX ORDER — ONDANSETRON 4 MG/1
4 TABLET, ORALLY DISINTEGRATING ORAL EVERY 6 HOURS PRN
Status: DISCONTINUED | OUTPATIENT
Start: 2022-08-24 | End: 2022-08-25 | Stop reason: HOSPADM

## 2022-08-24 RX ORDER — OXYCODONE HYDROCHLORIDE 5 MG/1
5 TABLET ORAL EVERY 4 HOURS PRN
Status: DISCONTINUED | OUTPATIENT
Start: 2022-08-24 | End: 2022-08-25 | Stop reason: HOSPADM

## 2022-08-24 RX ADMIN — SODIUM CHLORIDE 65 ML: 9 INJECTION, SOLUTION INTRAVENOUS at 15:10

## 2022-08-24 RX ADMIN — PROCHLORPERAZINE EDISYLATE 10 MG: 5 INJECTION INTRAMUSCULAR; INTRAVENOUS at 16:27

## 2022-08-24 RX ADMIN — SODIUM CHLORIDE: 9 INJECTION, SOLUTION INTRAVENOUS at 21:24

## 2022-08-24 RX ADMIN — MORPHINE SULFATE 4 MG: 4 INJECTION INTRAVENOUS at 15:00

## 2022-08-24 RX ADMIN — SODIUM CHLORIDE 1000 ML: 9 INJECTION, SOLUTION INTRAVENOUS at 16:27

## 2022-08-24 RX ADMIN — MORPHINE SULFATE 4 MG: 4 INJECTION INTRAVENOUS at 13:41

## 2022-08-24 RX ADMIN — ONDANSETRON 4 MG: 2 INJECTION INTRAMUSCULAR; INTRAVENOUS at 13:40

## 2022-08-24 RX ADMIN — ESCITALOPRAM OXALATE 20 MG: 20 TABLET ORAL at 22:05

## 2022-08-24 RX ADMIN — SODIUM CHLORIDE: 9 INJECTION, SOLUTION INTRAVENOUS at 18:02

## 2022-08-24 RX ADMIN — SODIUM CHLORIDE: 9 INJECTION, SOLUTION INTRAVENOUS at 19:46

## 2022-08-24 RX ADMIN — ONDANSETRON 4 MG: 2 INJECTION INTRAMUSCULAR; INTRAVENOUS at 11:45

## 2022-08-24 RX ADMIN — PANTOPRAZOLE SODIUM 40 MG: 40 INJECTION, POWDER, FOR SOLUTION INTRAVENOUS at 17:58

## 2022-08-24 RX ADMIN — IOPAMIDOL 90 ML: 755 INJECTION, SOLUTION INTRAVENOUS at 15:06

## 2022-08-24 RX ADMIN — HYDROMORPHONE HYDROCHLORIDE 0.5 MG: 1 INJECTION, SOLUTION INTRAMUSCULAR; INTRAVENOUS; SUBCUTANEOUS at 17:54

## 2022-08-24 RX ADMIN — MORPHINE SULFATE 4 MG: 4 INJECTION INTRAVENOUS at 11:48

## 2022-08-24 ASSESSMENT — ENCOUNTER SYMPTOMS
ABDOMINAL PAIN: 1
DIARRHEA: 1
DIAPHORESIS: 1
ARTHRALGIAS: 1
VOMITING: 0
FEVER: 0
NAUSEA: 1

## 2022-08-24 ASSESSMENT — ACTIVITIES OF DAILY LIVING (ADL)
ADLS_ACUITY_SCORE: 31
ADLS_ACUITY_SCORE: 31
ADLS_ACUITY_SCORE: 35
ADLS_ACUITY_SCORE: 31
ADLS_ACUITY_SCORE: 35
ADLS_ACUITY_SCORE: 35

## 2022-08-24 NOTE — ED NOTES
[facility]  ED Nurse Handoff Report    Sherri Lynn is a 36 year old female   ED Chief complaint: Abdominal Pain  . ED Diagnosis:   Final diagnoses:   Biliary colic   Hepatic steatosis   Bony sclerosis     Allergies:   Allergies   Allergen Reactions     Adhesive Tape      Augmentin      Azathioprine      Azathioprine Other (See Comments)     pancreatitits     Certolizumab Pegol Hives     Flu Virus Vaccine      Humira Swelling     Keflex [Cephalexin Hcl]      Pneumococcal Vaccines      Sulfa Drugs Itching     rashes     Vedolizumab Fatigue, Headache, Nausea and Photosensitivity     Zithromax [Azithromycin Dihydrate]      Penicillins Rash       Code Status: Full Code  Activity level - Baseline/Home:  Independent. Activity Level - Current:   Independent. Lift room needed: No. Bariatric: No   Needed: No   Isolation: No. Infection: Not Applicable.     Vital Signs:   Vitals:    08/24/22 1436 08/24/22 1520 08/24/22 1530 08/24/22 1630   BP: 96/50 110/74 97/68 124/74   Pulse: 78 89 87 78   Resp:       Temp:       TempSrc:       SpO2: 97% 100% 95% 91%       Cardiac Rhythm:  ,      Pain level:    Patient confused: No. Patient Falls Risk: No.   Elimination Status: Due to void   Patient Report - Initial Complaint: Abdominal pain. Focused Assessment: GI- Hx of Crohn's. C/o RUQ/epigastric pain for last 2 weeks, worse with eating. Also notes loose stools and nausea. RUQ/epigastric area tender to palpate.   Tests Performed: Labs, US, CT. Abnormal Results:   Labs Ordered and Resulted from Time of ED Arrival to Time of ED Departure   COMPREHENSIVE METABOLIC PANEL - Abnormal       Result Value    Sodium 137      Potassium 3.9      Creatinine 0.66      Urea Nitrogen 7.9      Chloride 101      Carbon Dioxide (CO2) 24      Anion Gap 12      Glucose 172 (*)     Calcium 10.1 (*)     Protein Total 7.9      Albumin 4.7      Bilirubin Total 0.2      Alkaline Phosphatase 93       (*)      (*)     GFR  Estimate >90     CBC WITH PLATELETS - Normal    WBC Count 9.1      RBC Count 3.86      Hemoglobin 12.0      Hematocrit 37.3      MCV 97      MCH 31.1      MCHC 32.2      RDW 11.8      Platelet Count 285     LIPASE - Normal    Lipase 52     HCG QUALITATIVE PREGNANCY - Normal    hCG Serum Qualitative Negative     COVID-19 VIRUS (CORONAVIRUS) BY PCR     CT Abdomen Pelvis w Contrast   Final Result   IMPRESSION:    1.  Diffuse hepatic steatosis and hepatomegaly, which is a possible   source of abdominal pain.   2.  No evidence of cholecystitis or pancreatitis.   3.  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 No evidence of   collapse or secondary degenerative changes.   4.  Stable size of 3.6 cm left adnexal lesion, most likely a cyst but   consider further evaluation with pelvic ultrasound on a nonemergent   basis.      TREY MABRY MD            SYSTEM ID:  [id number]      Abdomen US, limited (RUQ only)   Final Result   IMPRESSION:     1. Hepatic steatosis.   2. Gallbladder sludge without sonographic evidence of acute   cholecystitis.      TAMMY HAZEL MD            SYSTEM ID:  [id number]        .   Treatments provided: 1 L bolus NS, 4 mg Zofran x 2, 4 mg morphine x 3, 10 mg compazine   Family Comments: N/A  OBS brochure/video discussed/provided to patient:  Yes  ED Medications:   Medications   0.9% sodium chloride BOLUS (1,000 mLs Intravenous New Bag 8/24/22 1627)   ondansetron (ZOFRAN) injection 4 mg (4 mg Intravenous Given 8/24/22 1145)   morphine (PF) injection 4 mg (4 mg Intravenous Given 8/24/22 1148)   morphine (PF) injection 4 mg (4 mg Intravenous Given 8/24/22 1341)   ondansetron (ZOFRAN) injection 4 mg (4 mg Intravenous Given 8/24/22 1340)   morphine (PF) injection 4 mg (4 mg Intravenous Given 8/24/22 1500)   Saline CT scan flush (65 mLs Intravenous Given 8/24/22 1510)   iopamidol (ISOVUE-370) solution 500 mL (90 mLs Intravenous Given 8/24/22 1506)   prochlorperazine (COMPAZINE) injection 10 mg (10  mg Intravenous Given 8/24/22 3876)     Drips infusing:  No  For the majority of the shift, the patient's behavior Green. Interventions performed were N/A.    Sepsis treatment initiated: No     Patient tested for COVID 19 prior to admission: YES    ED Nurse Name/Phone Number: Abigail Delaney RN,   4:40 PM    RECEIVING UNIT ED HANDOFF REVIEW    Above ED Nurse Handoff Report was reviewed: Yes  Reviewed by: Almaz Moser RN on August 24, 2022 at 5:53 PM

## 2022-08-24 NOTE — PLAN OF CARE
ROOM # 205    Living Situation (if not independent, order SW consult): Home alone.   Facility name: N/A.  : Mother Karma.     Activity level at baseline: IND.  Activity level on admit: IND.     Who will be transporting you at discharge: Family.     Patient registered to observation; given Patient Bill of Rights; given the opportunity to ask questions about observation status and their plan of care.  Patient has been oriented to the observation room, bathroom and call light is in place.    Discussed discharge goals and expectations with patient/family.     Goal Outcome Evaluation: Admitted for observation stay.

## 2022-08-24 NOTE — H&P
[facility]  Internal Medicine  H & P      Patient Name: Sherri Lynn MRN# [id number]   Age: 36 year old YOB: 1985     Date of Admission:8/24/2022    Primary care provider: Megan Carlson  Date of Service: 8/24/2022         Assessment and Plan:   Sherri Lynn is a 36 year old female with a history of Obesity, Hidradinitis, Pancreatitis 2/2 Azathioprine, Anemia, Depression, HLD, Crohn's Disease, Hypothyroidism, Asthma who presents to the ED today with abdominal pain and diarrhea.    Abdominal Pain - pt presents with several weeks of RUQ pain worse in the past day with associated nausea, bloating and now diarrhea.  Labs with mild elevation in AST/ALT with normal tbili and lipase.  CT abd/pelvis with no signs of pancreatitis, cholecystitis or colitis.  CT does reveal hepatic steatosis and hepatomegaly.  Abdominal U/S reveals gallbladder sludge without evidence of ACC.  RUQ symptoms may be 2/2 biliary colic due to gallbladder sludge.  PUD/Gastritis also on the differential given hx of autoimmune gastritis.  Also consider Medication side effect from Metformin.  - liquid diet  - antiemetics, analgesics prn  - hold Metformin  - start Protonix  - General Surgery consult    Diarrhea - pt with baseline looser stools due to hx of Crohn's disease.  Initially developed loose stools after starting Metformin a few weeks ago which resolved.  Now with increased watery stools in the past 24 hours.  CT with no evidence of colitis.  Normal wbc, afebrile.  Possible infectious cause vs. related to biliary disease vs side effect of Metformin.  Crohn's flare appears less likely.  - check stool sample  - hold Metformin for now     DM type 2 - hgb A1C 8 (8/2/22).  BS today 172.  Started on Metformin earlier this month, now titrated up to 1gram bid.  - hold Metformin for now  - start ISS protocol      HLD - hold statin for now given mild elevation in AST/ALT.  Hold ASA in case of any  procedures    Hypothyroidism - continue Levothyroxine    Depression - continue Lexapro    CODE: full  Diet/IVF: liquid diet, npo after midnight, NS  GI ppx:  protonix  DVT ppx: SCD    Laura Huang MS, PA-C  Physician Assistant   Hospitalist Service  Pager: [phone]           Chief Complaint:   Abdominal Pain         HPI:   36 year old female with a history of Pancreatitis 2/2 Azathioprine, Anemia, Depression, HLD, Crohn's Disease, Hypothyroidism, Asthma who presents to the ED today with abdominal pain and diarrhea.    Patient reports she was recently diagnosed with Hypothyroidism, HLD and Diabetes Mellitus Type 2.  She has been started on ASA, Statin and has been titrating up her Metformin weekly.  Initially she had some loose stools after starting Metformin which resolved.  Over the past few weeks, she has developed a RUQ abdomina pain, colicky in nature and initially not necessarily associated with po intake.  Yesterday, patient had increased RUQ pain with radiation to the back worse after eating with nausea, bloating and several watery bowel movements.  Symptoms feel different than her Crohn's Disease.  She was concerned she may have pancreatitis she has had in the past related to Imuran.             Past Medical History:     Past Medical History:   Diagnosis Date     Asthma      Crohn's ileitis, unspecified complication (H)           Past Surgical History:     Past Surgical History:   Procedure Laterality Date     RELEASE CARPAL TUNNEL Left 2014          Social History:     Social History     Socioeconomic History     Marital status: Single     Spouse name: Not on file     Number of children: Not on file     Years of education: Not on file     Highest education level: Not on file   Occupational History     Not on file   Tobacco Use     Smoking status: Never Smoker     Smokeless tobacco: Never Used   Substance and Sexual Activity     Alcohol use: Yes     Drug use: No     Sexual activity: Never   Other Topics  Concern     Not on file   Social History Narrative     Not on file     Social Determinants of Health     Financial Resource Strain: Not on file   Food Insecurity: Not on file   Transportation Needs: Not on file   Physical Activity: Not on file   Stress: Not on file   Social Connections: Not on file   Intimate Partner Violence: Not on file   Housing Stability: Not on file          Family History:     Family History   Problem Relation Age of Onset     Asthma Mother      Hypertension Mother      Asthma Sister      Asthma Brother           Allergies:      Allergies   Allergen Reactions     Adhesive Tape      Augmentin      Azathioprine      Azathioprine Other (See Comments)     pancreatitits     Certolizumab Pegol Hives     Flu Virus Vaccine      Humira Swelling     Keflex [Cephalexin Hcl]      Pneumococcal Vaccines      Sulfa Drugs Itching     rashes     Vedolizumab Fatigue, Headache, Nausea and Photosensitivity     Zithromax [Azithromycin Dihydrate]      Penicillins Rash          Medications:     Prior to Admission medications    Medication Sig Last Dose Taking? Auth Provider Long Term End Date   albuterol (PROAIR HFA/PROVENTIL HFA/VENTOLIN HFA) 108 (90 Base) MCG/ACT inhaler Inhale 2 puffs into the lungs every 6 hours   Megan Carlson MD Yes    alcohol swab prep pads Use to swab area of injection/jaye as directed.   Megan Carlson MD     aspirin (ASA) 81 MG EC tablet Take 1 tablet (81 mg) by mouth daily   Megan Carlson MD     atorvastatin (LIPITOR) 40 MG tablet Take 1 tablet (40 mg) by mouth daily   Megan Carlson MD Yes    blood glucose (NO BRAND SPECIFIED) test strip Use to test blood sugar twice daily   times daily or as directed. To accompany: Blood Glucose Monitor Brands: per insurance.   Megan Carlson MD     blood glucose calibration (NO BRAND SPECIFIED) solution To accompany: Blood Glucose Monitor Brands: per insurance.   Megan Carlson MD     blood glucose monitoring (NO BRAND SPECIFIED) meter device kit Use to  test blood sugar bid  times daily or as directed. Preferred blood glucose meter OR supplies to accompany: Blood Glucose Monitor Brands: per insurance.   Megan Carlson MD     Continuous Blood Gluc Sensor (FREESTYLE KHADIJAH 2 SENSOR) MISC 1 Device every 14 days   Megan Carlson MD     diphenhydrAMINE HCl 12.5 MG CHEW    Reported, Patient     escitalopram (LEXAPRO) 20 MG tablet Take 1 tablet (20 mg) by mouth daily   Megan Carlson MD Yes    etonogestrel (NEXPLANON) 68 MG IMPL 1 each   Reported, Patient Yes    levothyroxine (SYNTHROID/LEVOTHROID) 112 MCG tablet Take 1 tablet (112 mcg) by mouth daily   Megan Carlson MD Yes    levothyroxine (SYNTHROID/LEVOTHROID) 88 MCG tablet Take 1 tablet (88 mcg) by mouth daily for 30 days   Osmin Ocampo MD Yes 9/3/22   metFORMIN (GLUCOPHAGE XR) 500 MG 24 hr tablet Take 4 tablets (2,000 mg) by mouth daily (with dinner) for 90 days   Megan Carlson MD Yes 11/6/22   multivitamin w/minerals (THERA-VIT-M) tablet    Reported, Patient     Probiotic Product (PROBIOTIC-10 ULTIMATE) CAPS    Reported, Patient     thin (NO BRAND SPECIFIED) lancets Use with lanceting device BID. To accompany: Blood Glucose Monitor Brands: per insurance.   Megan Carlson MD            Review of Systems:   A complete ROS was performed and is negative other than what is stated in the HPI.       Physical Exam:   Blood pressure 110/74, pulse 89, temperature 97.6  F (36.4  C), temperature source Temporal, resp. rate 16, SpO2 100 %, not currently breastfeeding.  General: Alert, interactive, NAD  HEENT: AT/NC, sclera anicteric, PERRL, EOMI, MMM  Neck: Supple, no JVD  Chest/Resp: clear to auscultation bilaterally, no crackles or wheezes  Heart/CV: regular rate and rhythm, no murmur  Abdomen/GI: Soft, mild RUQ tenderness, nondistended. +BS.  No rebound or guarding.  Extremities/MSK: No LE edema  Skin: Warm and dry  Neuro: Alert & oriented x 3, no focal deficits, moves all extremities equally         Labs:   ROUTINE ICU LABS  (Last four results)  CMP  Recent Labs   Lab 08/24/22  1050 08/23/22  0745     --    POTASSIUM 3.9  --    CHLORIDE 101  --    CO2 24  --    ANIONGAP 12  --    * 143*   BUN 7.9  --    CR 0.66  --    GFRESTIMATED >90  --    LONNIE 10.1*  --    PROTTOTAL 7.9  --    ALBUMIN 4.7  --    BILITOTAL 0.2  --    ALKPHOS 93  --    *  --    *  --      CBC  Recent Labs   Lab 08/24/22  1050   WBC 9.1   RBC 3.86   HGB 12.0   HCT 37.3   MCV 97   MCH 31.1   MCHC 32.2   RDW 11.8        INRNo lab results found in last 7 days.  Arterial Blood GasNo lab results found in last 7 days.       Imaging/Procedures:     Results for orders placed or performed during the hospital encounter of 08/24/22   Abdomen US, limited (RUQ only)    Narrative    ULTRASOUND ABDOMEN LIMITED August 24, 2022 12:45 PM     HISTORY: Right upper quadrant abdominal pain.    COMPARISON: CT abdomen/pelvis on 10/15/2020.    FINDINGS: Technically challenging exam due to patient's body habitus  and overlying bowel gas.    Gallbladder: Gallbladder sludge. No shadowing gallstones, gallbladder  wall thickening or pericholecystic fluid. Sonographic Valentino's sign is  negative.    Bile ducts: CHD is normal diameter. No intrahepatic biliary  dilatation. The distal aspect of the common bile duct is obscured by  overlying bowel gas.    Liver: It demonstrates increased parenchymal echogenicity. No focal  hepatic mass is visualized.    Pancreas: Partially obscured by overlying bowel gas,  but grossly  unremarkable.     Right kidney: No hydronephrosis or shadowing calculi.    Aorta and IVC: Not specifically assessed.       Impression    IMPRESSION:    1. Hepatic steatosis.  2. Gallbladder sludge without sonographic evidence of acute  cholecystitis.    TAMMY HAZEL MD         SYSTEM ID:  [id number]   CT Abdomen Pelvis w Contrast    Narrative    CT ABDOMEN PELVIS W CONTRAST 8/24/2022 3:13 PM    CLINICAL HISTORY: RUQ and epigastric pain; concern for  pancreatitis    TECHNIQUE: CT scan of the abdomen and pelvis was performed following  injection of IV contrast. Multiplanar reformats were obtained. Dose  reduction techniques were used.  CONTRAST:  90mL Isovue-370    COMPARISON: Same date abdominal ultrasound, CT 10/15/2020    FINDINGS:   LOWER CHEST: Unremarkable.    HEPATOBILIARY: Diffuse hepatic steatosis and hepatomegaly.    PANCREAS: Normal.    SPLEEN: Mild splenomegaly.    ADRENAL GLANDS: Normal.    KIDNEYS/BLADDER: No hydronephrosis. Urinary bladder is unremarkable.    BOWEL: No obstruction or inflammatory change.    PELVIC ORGANS: Stable size of left adnexal lesion, possibly a cyst  measuring 3.6 cm (series 3 image 175).    ADDITIONAL FINDINGS: None.    MUSCULOSKELETAL: Subtle sclerosis in the femoral heads bilaterally,  right greater than left, could represent early avascular necrosis  without collapse.      Impression    IMPRESSION:   1.  Diffuse hepatic steatosis and hepatomegaly, which is a possible  source of abdominal pain.  2.  No evidence of cholecystitis or pancreatitis.  3.  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 No evidence of  collapse or secondary degenerative changes.  4.  Stable size of 3.6 cm left adnexal lesion, most likely a cyst but  consider further evaluation with pelvic ultrasound on a nonemergent  basis.    TREY MABRY MD         SYSTEM ID:  [id number]

## 2022-08-24 NOTE — ED PROVIDER NOTES
History   Chief Complaint:  Abdominal Pain     The history is provided by the patient.      Sherri Lynn is a 36 year old female with history of Crohn's disease, morbid obesity, type 2 diabetes mellitus, and hyperlipidemia who presents with RUQ abdominal pain and diarrhea that has been worsening for the past couple of weeks. The patient reports that she first developed right sided abdominal pain a few weeks ago which has progressively worsened since. Along with the pain, she has had constant diarrhea, having up to 15 loose, watery stool bowel movements in a day. Since the onset of these symptoms, she has been unable to have any PO intake without having diarrhea and exacerbated abdominal pain afterwards. Of note, the patient does have a history of Crohn's disease and pancreatitis and reports that this pain is similar to when she was diagnosed with pancreatitis. Her symptoms are not similar to her Crohn's flare ups. She has never had surgery for her Crohn's, but did recently have a soft tissue resection in her bilateral armpits on 7/7. The patient was also recently diagnosed with diabetes and Hashimoto's thyroiditis and has been on metformin and levothyroxine for the last 3 weeks. After starting the metformin on 8/8, the patient did have some loose stools for a couple of days afterwards, but notes that it was not as constant as presently. Presently, she endorses having pain in her right clavicle as well as some nausea, but denies any fever or emesis. She has been diaphoretic due to the pain and took a dose of oxycodone prior to presentation without alleviation. Her mother denies any family history of digestive disorders or food sensitivities.     Review of Systems   Constitutional: Positive for diaphoresis. Negative for fever.   Gastrointestinal: Positive for abdominal pain, diarrhea and nausea. Negative for vomiting.   Musculoskeletal: Positive for arthralgias (right clavicle).   All other systems reviewed and  are negative.    Allergies:  Adhesive Tape  Augmentin  Azathioprine  Certolizumab Pegol  Flu Virus Vaccine  Humira  Keflex [Cephalexin Hcl]  Pneumococcal Vaccines  Sulfa Drugs  Vedolizumab  Zithromax [Azithromycin Dihydrate]  Penicillins    Medications:  Albuterol inhaler  Lipitor  Diphenhydramine  Lexapro  Nexplanon  Levothyroxine  Metformin    Past Medical History:     Crohn's disease  Carpal tunnel syndrome  Asthma  Inflammatory arthritis  Depression  Hidradenitis suppurativa  Morbid obesity  Type 2 diabetes mellitus   Hyperlipidemia   Anemia  Ovarian cyst  Pure hyperglyceridemia  Regional enteritis of small intestine  Exercise-induced bronchospasm  Gastritis and gastroduodenitis  Pancreatitis  Colitis  Hashimoto's thyroiditis     Past Surgical History:    Carpal tunnel release  Colonoscopy  Bone density study    Family History:    Asthma x3  Hypertension   Hyperlipidemia     Social History:  The patient presents to the ED with her mother.  The patient arrived to the ED in a private vehicle.  PCP: Megan Carlson MD, Family Medicine     Physical Exam     Patient Vitals for the past 24 hrs:   BP Temp Temp src Pulse Resp SpO2   08/24/22 1630 124/74 -- -- 78 -- 91 %   08/24/22 1530 97/68 -- -- 87 -- 95 %   08/24/22 1520 110/74 -- -- 89 -- 100 %   08/24/22 1436 96/50 -- -- 78 -- 97 %   08/24/22 1430 -- -- -- -- -- 96 %   08/24/22 1415 -- -- -- -- -- 96 %   08/24/22 1400 -- -- -- -- -- 96 %   08/24/22 1350 -- -- -- -- -- 96 %   08/24/22 1300 139/83 -- -- 96 -- 98 %   08/24/22 1230 110/54 -- -- 83 -- 95 %   08/24/22 1225 115/76 -- -- 74 -- 96 %   08/24/22 1143 112/76 -- -- 77 -- 98 %   08/24/22 1044 (!) 123/105 97.6  F (36.4  C) Temporal 90 16 98 %     Physical Exam  Constitutional: Vital signs reviewed as above.   HENT:               Head: No external signs of trauma noted.              Eyes: Conjunctivae are normal. Pupils are equal, round, and reactive to light.   Cardiovascular:               Normal rate, regular  rhythm and normal heart sounds.                Exam reveals no friction rub.                No murmur heard.  Pulmonary/Chest:               Effort normal and breath sounds normal.               No respiratory distress.               There are no wheezes.               There are no rales.   Gastrointestinal:               Soft.               Bowel sounds normal.               There is no distension.               There is epigastric and RUQ tenderness.               There is no rebound or guarding.   Musculoskeletal:               Normal range of motion.               Normal Tone  Neurological: Patient is alert and oriented to person, place, and time.   Skin: Skin is warm and dry. Patient is not diaphoretic  Psychiatric: The patient appears calm    Emergency Department Course     Imaging:  CT Abdomen Pelvis w Contrast   Final Result   IMPRESSION:    1.  Diffuse hepatic steatosis and hepatomegaly, which is a possible   source of abdominal pain.   2.  No evidence of cholecystitis or pancreatitis.   3.  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 No evidence of   collapse or secondary degenerative changes.   4.  Stable size of 3.6 cm left adnexal lesion, most likely a cyst but   consider further evaluation with pelvic ultrasound on a nonemergent   basis.      TREY MABRY MD            SYSTEM ID:  [id number]      Abdomen US, limited (RUQ only)   Final Result   IMPRESSION:     1. Hepatic steatosis.   2. Gallbladder sludge without sonographic evidence of acute   cholecystitis.      TAMMY HAZEL MD            SYSTEM ID:  [id number]        Report per radiology    Laboratory:  Labs Ordered and Resulted from Time of ED Arrival to Time of ED Departure   COMPREHENSIVE METABOLIC PANEL - Abnormal       Result Value    Sodium 137      Potassium 3.9      Creatinine 0.66      Urea Nitrogen 7.9      Chloride 101      Carbon Dioxide (CO2) 24      Anion Gap 12      Glucose 172 (*)     Calcium 10.1 (*)     Protein Total 7.9       Albumin 4.7      Bilirubin Total 0.2      Alkaline Phosphatase 93       (*)      (*)     GFR Estimate >90     CBC WITH PLATELETS - Normal    WBC Count 9.1      RBC Count 3.86      Hemoglobin 12.0      Hematocrit 37.3      MCV 97      MCH 31.1      MCHC 32.2      RDW 11.8      Platelet Count 285     LIPASE - Normal    Lipase 52     HCG QUALITATIVE PREGNANCY - Normal    hCG Serum Qualitative Negative     COVID-19 VIRUS (CORONAVIRUS) BY PCR   GLUCOSE MONITOR NURSING POCT   ENTERIC BACTERIA AND VIRUS PANEL BY WALTER STOOL   CLOSTRIDIUM DIFFICILE TOXIN B      Emergency Department Course:     Reviewed:  I reviewed nursing notes, vitals, past medical history and Care Everywhere    Assessments/Consults:  ED Course as of 08/24/22 1705   Wed Aug 24, 2022   1528 Rechecked and updated.   1549 D/W Laura Huang PA-C, accepting for Dr. Huseyin Solo.       Interventions:  1145 Zofran 4 mg IV  1148 Morphine 4 mg IV  1340 Zofran 4 mg IV  1341 Morphine 4 mg IV    Disposition:  Care of the patient was transferred to my colleague Dr. Busch pending CT results.     Impression & Plan     Medical Decision Making:    This 36-year-old female patient presents to the ED due to right upper quadrant dental pain.  Please see the HPI and exam for specifics.  A broad differential was considered including cholelithiasis/cholecystitis, pancreatitis, inflammatory bowel issues, etc.  The above work-up was undertaken.  Findings are significant for biliary sludge and hepatic steatosis as well as elevation in AST/ALT.  The patient CT results also makes mention of sclerotic changes of her femoral heads possibly consistent with early avascular necrosis.  This was discussed with the patient as well.     The patient was also given antiemetics and pain medications.   She noted minimal improvement in her symptoms.  Due to persistent symptoms as well as biliary sludge, I discussed observation with the patient who agrees.  This was discussed with  the hospitalist who accepts patient for further monitoring and care.      Diagnosis:    ICD-10-CM    1. Biliary colic  K80.50    2. Hepatic steatosis  K76.0    3. Bony sclerosis  Q78.2     both femoral heads       Discharge Medications:  New Prescriptions    No medications on file     Scribe Disclosure:  TWIN, Brigid Wyatt, am serving as a scribe at 11:19 AM on 8/24/2022 to document services personally performed by Tadeo Acuna DO based on my observations and the provider's statements to me.            Tadeo Acuna DO  08/24/22 1706

## 2022-08-24 NOTE — ED TRIAGE NOTES
RUQ abd pain for couple weeks. Progressively getting worse especially with eating. Loose stools now. Denies fevers but has chills. Pepto and gasx ineffective. Oxy 5mg taken approx 1hr PTA. Started simvastatin, synthroid, ASA and metformin 2 weeks ago.

## 2022-08-24 NOTE — PHARMACY-ADMISSION MEDICATION HISTORY
Admission medication history interview status for this patient is complete. See [facility] admission navigator for allergy information, prior to admission medications and immunization status.     Medication history interview done, indicate source(s): Patient  Medication history resources (including written lists, pill bottles, clinic record):None  Pharmacy: [facility]    Changes made to PTA medication list:  Added: None  Changed: added sig to Benadryl, multivitamin, and probiotic.   Reported as Not Taking: levothyroxine 88 mcg  Removed: None    Actions taken by pharmacist (provider contacted, etc):None     Additional medication history information:None    Medication reconciliation/reorder completed by provider prior to medication history?  N   (Y/N)       Prior to Admission medications    Medication Sig Last Dose Taking? Auth Provider Long Term End Date   albuterol (PROAIR HFA/PROVENTIL HFA/VENTOLIN HFA) 108 (90 Base) MCG/ACT inhaler Inhale 2 puffs into the lungs every 6 hours prn at prn Yes Megan Carlson MD Yes    aspirin (ASA) 81 MG EC tablet Take 1 tablet (81 mg) by mouth daily 8/23/2022 at hs Yes Megan Carlson MD     atorvastatin (LIPITOR) 40 MG tablet Take 1 tablet (40 mg) by mouth daily 8/23/2022 at hs Yes Megan Carlson MD Yes    diphenhydrAMINE HCl 12.5 MG CHEW Take 12.5 mg by mouth daily as needed prn at prn Yes Reported, Patient     escitalopram (LEXAPRO) 20 MG tablet Take 1 tablet (20 mg) by mouth daily 8/23/2022 at hs Yes Megan Carlson MD Yes    etonogestrel (NEXPLANON) 68 MG IMPL 1 each  Yes Reported, Patient Yes    levothyroxine (SYNTHROID/LEVOTHROID) 112 MCG tablet Take 1 tablet (112 mcg) by mouth daily 8/24/2022 at am Yes Megan Carlson MD Yes    metFORMIN (GLUCOPHAGE XR) 500 MG 24 hr tablet Take 4 tablets (2,000 mg) by mouth daily (with dinner) for 90 days  Patient taking differently: Take 1,000 mg by mouth 2 times daily (with meals) 8/23/2022 at x2 Yes Megan Carlson MD Yes 11/6/22   multivitamin w/minerals  (THERA-VIT-M) tablet Take 1 tablet by mouth daily Past Week at am Yes Reported, Patient     Probiotic Product (PROBIOTIC-10 ULTIMATE) CAPS Take 1 capsule by mouth daily Past Week at am Yes Reported, Patient     alcohol swab prep pads Use to swab area of injection/jaye as directed.   Megan Carlson MD     blood glucose (NO BRAND SPECIFIED) test strip Use to test blood sugar twice daily   times daily or as directed. To accompany: Blood Glucose Monitor Brands: per insurance.   Megan Carlson MD     blood glucose calibration (NO BRAND SPECIFIED) solution To accompany: Blood Glucose Monitor Brands: per insurance.   Megan Carlson MD     blood glucose monitoring (NO BRAND SPECIFIED) meter device kit Use to test blood sugar bid  times daily or as directed. Preferred blood glucose meter OR supplies to accompany: Blood Glucose Monitor Brands: per insurance.   Megan Carlson MD     Continuous Blood Gluc Sensor (FREESTYLE KHADIJAH 2 SENSOR) MISC 1 Device every 14 days   Megan Carlson MD     levothyroxine (SYNTHROID/LEVOTHROID) 88 MCG tablet Take 1 tablet (88 mcg) by mouth daily for 30 days  Patient not taking: Reported on 8/24/2022 Not Taking at Unknown time  Osmin Ocampo MD Yes 9/3/22   thin (NO BRAND SPECIFIED) lancets Use with lanceting device BID. To accompany: Blood Glucose Monitor Brands: per insurance.   Megan Carlson MD         '

## 2022-08-25 ENCOUNTER — ANESTHESIA EVENT (OUTPATIENT)
Dept: SURGERY | Facility: CLINIC | Age: 37
End: 2022-08-25
Payer: COMMERCIAL

## 2022-08-25 ENCOUNTER — ANESTHESIA (OUTPATIENT)
Dept: SURGERY | Facility: CLINIC | Age: 37
End: 2022-08-25
Payer: COMMERCIAL

## 2022-08-25 VITALS
DIASTOLIC BLOOD PRESSURE: 73 MMHG | SYSTOLIC BLOOD PRESSURE: 122 MMHG | HEART RATE: 94 BPM | OXYGEN SATURATION: 99 % | TEMPERATURE: 98 F | RESPIRATION RATE: 16 BRPM | WEIGHT: 201.5 LBS | HEIGHT: 63 IN | BODY MASS INDEX: 35.7 KG/M2

## 2022-08-25 LAB
ALBUMIN SERPL BCG-MCNC: 4.2 G/DL (ref 3.5–5.2)
ALP SERPL-CCNC: 85 U/L (ref 35–104)
ALT SERPL W P-5'-P-CCNC: 180 U/L (ref 10–35)
ANION GAP SERPL CALCULATED.3IONS-SCNC: 11 MMOL/L (ref 7–15)
AST SERPL W P-5'-P-CCNC: 134 U/L (ref 10–35)
BILIRUB SERPL-MCNC: 0.3 MG/DL
BUN SERPL-MCNC: 6.4 MG/DL (ref 6–20)
CALCIUM SERPL-MCNC: 9 MG/DL (ref 8.6–10)
CHLORIDE SERPL-SCNC: 103 MMOL/L (ref 98–107)
CREAT SERPL-MCNC: 0.74 MG/DL (ref 0.51–0.95)
DEPRECATED HCO3 PLAS-SCNC: 25 MMOL/L (ref 22–29)
ERYTHROCYTE [DISTWIDTH] IN BLOOD BY AUTOMATED COUNT: 11.8 % (ref 10–15)
GFR SERPL CREATININE-BSD FRML MDRD: >90 ML/MIN/1.73M2
GLUCOSE BLDC GLUCOMTR-MCNC: 139 MG/DL (ref 70–99)
GLUCOSE BLDC GLUCOMTR-MCNC: 149 MG/DL (ref 70–99)
GLUCOSE SERPL-MCNC: 163 MG/DL (ref 70–99)
HCT VFR BLD AUTO: 36.7 % (ref 35–47)
HGB BLD-MCNC: 11.3 G/DL (ref 11.7–15.7)
MCH RBC QN AUTO: 31 PG (ref 26.5–33)
MCHC RBC AUTO-ENTMCNC: 30.8 G/DL (ref 31.5–36.5)
MCV RBC AUTO: 101 FL (ref 78–100)
PLATELET # BLD AUTO: 278 10E3/UL (ref 150–450)
POTASSIUM SERPL-SCNC: 4 MMOL/L (ref 3.4–5.3)
PROT SERPL-MCNC: 7.2 G/DL (ref 6.4–8.3)
RBC # BLD AUTO: 3.65 10E6/UL (ref 3.8–5.2)
SODIUM SERPL-SCNC: 139 MMOL/L (ref 136–145)
WBC # BLD AUTO: 7.8 10E3/UL (ref 4–11)

## 2022-08-25 PROCEDURE — 360N000083 HC SURGERY LEVEL 3 W/ FLUORO, PER MIN: Performed by: SURGERY

## 2022-08-25 PROCEDURE — 250N000011 HC RX IP 250 OP 636: Performed by: SURGERY

## 2022-08-25 PROCEDURE — 258N000003 HC RX IP 258 OP 636: Performed by: NURSE ANESTHETIST, CERTIFIED REGISTERED

## 2022-08-25 PROCEDURE — 88304 TISSUE EXAM BY PATHOLOGIST: CPT | Mod: 26 | Performed by: PATHOLOGY

## 2022-08-25 PROCEDURE — 999N000141 HC STATISTIC PRE-PROCEDURE NURSING ASSESSMENT: Performed by: SURGERY

## 2022-08-25 PROCEDURE — 250N000011 HC RX IP 250 OP 636: Performed by: ANESTHESIOLOGY

## 2022-08-25 PROCEDURE — 370N000017 HC ANESTHESIA TECHNICAL FEE, PER MIN: Performed by: SURGERY

## 2022-08-25 PROCEDURE — 47562 LAPAROSCOPIC CHOLECYSTECTOMY: CPT | Mod: AS | Performed by: PHYSICIAN ASSISTANT

## 2022-08-25 PROCEDURE — 250N000011 HC RX IP 250 OP 636: Performed by: NURSE ANESTHETIST, CERTIFIED REGISTERED

## 2022-08-25 PROCEDURE — 250N000013 HC RX MED GY IP 250 OP 250 PS 637: Performed by: SURGERY

## 2022-08-25 PROCEDURE — 250N000009 HC RX 250: Performed by: SURGERY

## 2022-08-25 PROCEDURE — 80053 COMPREHEN METABOLIC PANEL: CPT | Performed by: PHYSICIAN ASSISTANT

## 2022-08-25 PROCEDURE — 250N000013 HC RX MED GY IP 250 OP 250 PS 637: Performed by: PHYSICIAN ASSISTANT

## 2022-08-25 PROCEDURE — 250N000011 HC RX IP 250 OP 636: Performed by: PHYSICIAN ASSISTANT

## 2022-08-25 PROCEDURE — 272N000001 HC OR GENERAL SUPPLY STERILE: Performed by: SURGERY

## 2022-08-25 PROCEDURE — 88304 TISSUE EXAM BY PATHOLOGIST: CPT | Mod: TC | Performed by: SURGERY

## 2022-08-25 PROCEDURE — 47562 LAPAROSCOPIC CHOLECYSTECTOMY: CPT | Performed by: SURGERY

## 2022-08-25 PROCEDURE — 82962 GLUCOSE BLOOD TEST: CPT

## 2022-08-25 PROCEDURE — 85027 COMPLETE CBC AUTOMATED: CPT | Performed by: PHYSICIAN ASSISTANT

## 2022-08-25 PROCEDURE — 250N000013 HC RX MED GY IP 250 OP 250 PS 637: Performed by: ANESTHESIOLOGY

## 2022-08-25 PROCEDURE — 250N000009 HC RX 250: Performed by: NURSE ANESTHETIST, CERTIFIED REGISTERED

## 2022-08-25 PROCEDURE — 258N000001 HC RX 258: Performed by: SURGERY

## 2022-08-25 PROCEDURE — 96376 TX/PRO/DX INJ SAME DRUG ADON: CPT

## 2022-08-25 PROCEDURE — 710N000012 HC RECOVERY PHASE 2, PER MINUTE: Performed by: SURGERY

## 2022-08-25 PROCEDURE — 710N000010 HC RECOVERY PHASE 1, LEVEL 2, PER MIN: Performed by: SURGERY

## 2022-08-25 PROCEDURE — 36415 COLL VENOUS BLD VENIPUNCTURE: CPT | Performed by: PHYSICIAN ASSISTANT

## 2022-08-25 PROCEDURE — G0378 HOSPITAL OBSERVATION PER HR: HCPCS

## 2022-08-25 RX ORDER — OXYCODONE HYDROCHLORIDE 5 MG/1
5 TABLET ORAL
Status: COMPLETED | OUTPATIENT
Start: 2022-08-25 | End: 2022-08-25

## 2022-08-25 RX ORDER — ONDANSETRON 4 MG/1
4 TABLET, ORALLY DISINTEGRATING ORAL EVERY 30 MIN PRN
Status: DISCONTINUED | OUTPATIENT
Start: 2022-08-25 | End: 2022-08-25 | Stop reason: HOSPADM

## 2022-08-25 RX ORDER — CLINDAMYCIN PHOSPHATE 900 MG/50ML
900 INJECTION, SOLUTION INTRAVENOUS SEE ADMIN INSTRUCTIONS
Status: DISCONTINUED | OUTPATIENT
Start: 2022-08-25 | End: 2022-08-25 | Stop reason: HOSPADM

## 2022-08-25 RX ORDER — SODIUM CHLORIDE, SODIUM LACTATE, POTASSIUM CHLORIDE, CALCIUM CHLORIDE 600; 310; 30; 20 MG/100ML; MG/100ML; MG/100ML; MG/100ML
INJECTION, SOLUTION INTRAVENOUS CONTINUOUS
Status: DISCONTINUED | OUTPATIENT
Start: 2022-08-25 | End: 2022-08-25 | Stop reason: HOSPADM

## 2022-08-25 RX ORDER — OXYCODONE HYDROCHLORIDE 5 MG/1
5-10 TABLET ORAL EVERY 4 HOURS PRN
Qty: 20 TABLET | Refills: 0 | Status: SHIPPED | OUTPATIENT
Start: 2022-08-25 | End: 2022-09-27

## 2022-08-25 RX ORDER — ACETAMINOPHEN 325 MG/1
975 TABLET ORAL ONCE
Status: COMPLETED | OUTPATIENT
Start: 2022-08-25 | End: 2022-08-25

## 2022-08-25 RX ORDER — INDOCYANINE GREEN AND WATER 25 MG
2.5 KIT INJECTION ONCE
Status: COMPLETED | OUTPATIENT
Start: 2022-08-25 | End: 2022-08-25

## 2022-08-25 RX ORDER — DIAZEPAM 10 MG/2ML
2.5 INJECTION, SOLUTION INTRAMUSCULAR; INTRAVENOUS
Status: DISCONTINUED | OUTPATIENT
Start: 2022-08-25 | End: 2022-08-25 | Stop reason: HOSPADM

## 2022-08-25 RX ORDER — OXYCODONE HYDROCHLORIDE 5 MG/1
5 TABLET ORAL EVERY 4 HOURS PRN
Status: DISCONTINUED | OUTPATIENT
Start: 2022-08-25 | End: 2022-08-25 | Stop reason: HOSPADM

## 2022-08-25 RX ORDER — ONDANSETRON 4 MG/1
4 TABLET, ORALLY DISINTEGRATING ORAL EVERY 8 HOURS PRN
Qty: 10 TABLET | Refills: 0 | Status: SHIPPED | OUTPATIENT
Start: 2022-08-25

## 2022-08-25 RX ORDER — DIPHENHYDRAMINE HYDROCHLORIDE 50 MG/ML
25 INJECTION INTRAMUSCULAR; INTRAVENOUS EVERY 6 HOURS PRN
Status: DISCONTINUED | OUTPATIENT
Start: 2022-08-25 | End: 2022-08-25 | Stop reason: HOSPADM

## 2022-08-25 RX ORDER — HALOPERIDOL 5 MG/ML
1 INJECTION INTRAMUSCULAR
Status: DISCONTINUED | OUTPATIENT
Start: 2022-08-25 | End: 2022-08-25 | Stop reason: HOSPADM

## 2022-08-25 RX ORDER — LIDOCAINE 40 MG/G
CREAM TOPICAL
Status: DISCONTINUED | OUTPATIENT
Start: 2022-08-25 | End: 2022-08-25 | Stop reason: HOSPADM

## 2022-08-25 RX ORDER — KETOROLAC TROMETHAMINE 30 MG/ML
INJECTION, SOLUTION INTRAMUSCULAR; INTRAVENOUS PRN
Status: DISCONTINUED | OUTPATIENT
Start: 2022-08-25 | End: 2022-08-25

## 2022-08-25 RX ORDER — ONDANSETRON 2 MG/ML
INJECTION INTRAMUSCULAR; INTRAVENOUS PRN
Status: DISCONTINUED | OUTPATIENT
Start: 2022-08-25 | End: 2022-08-25

## 2022-08-25 RX ORDER — DIMENHYDRINATE 50 MG/ML
25 INJECTION, SOLUTION INTRAMUSCULAR; INTRAVENOUS
Status: DISCONTINUED | OUTPATIENT
Start: 2022-08-25 | End: 2022-08-25 | Stop reason: HOSPADM

## 2022-08-25 RX ORDER — BUPIVACAINE HYDROCHLORIDE AND EPINEPHRINE 5; 5 MG/ML; UG/ML
INJECTION, SOLUTION PERINEURAL PRN
Status: DISCONTINUED | OUTPATIENT
Start: 2022-08-25 | End: 2022-08-25 | Stop reason: HOSPADM

## 2022-08-25 RX ORDER — LABETALOL HYDROCHLORIDE 5 MG/ML
10 INJECTION, SOLUTION INTRAVENOUS
Status: DISCONTINUED | OUTPATIENT
Start: 2022-08-25 | End: 2022-08-25 | Stop reason: HOSPADM

## 2022-08-25 RX ORDER — DEXAMETHASONE SODIUM PHOSPHATE 4 MG/ML
INJECTION, SOLUTION INTRA-ARTICULAR; INTRALESIONAL; INTRAMUSCULAR; INTRAVENOUS; SOFT TISSUE PRN
Status: DISCONTINUED | OUTPATIENT
Start: 2022-08-25 | End: 2022-08-25

## 2022-08-25 RX ORDER — SODIUM CHLORIDE, SODIUM LACTATE, POTASSIUM CHLORIDE, CALCIUM CHLORIDE 600; 310; 30; 20 MG/100ML; MG/100ML; MG/100ML; MG/100ML
INJECTION, SOLUTION INTRAVENOUS CONTINUOUS PRN
Status: DISCONTINUED | OUTPATIENT
Start: 2022-08-25 | End: 2022-08-25

## 2022-08-25 RX ORDER — ALBUTEROL SULFATE 0.83 MG/ML
2.5 SOLUTION RESPIRATORY (INHALATION) EVERY 4 HOURS PRN
Status: DISCONTINUED | OUTPATIENT
Start: 2022-08-25 | End: 2022-08-25 | Stop reason: HOSPADM

## 2022-08-25 RX ORDER — CLINDAMYCIN PHOSPHATE 900 MG/50ML
900 INJECTION, SOLUTION INTRAVENOUS
Status: COMPLETED | OUTPATIENT
Start: 2022-08-25 | End: 2022-08-25

## 2022-08-25 RX ORDER — PROPOFOL 10 MG/ML
INJECTION, EMULSION INTRAVENOUS PRN
Status: DISCONTINUED | OUTPATIENT
Start: 2022-08-25 | End: 2022-08-25

## 2022-08-25 RX ORDER — PROPOFOL 10 MG/ML
INJECTION, EMULSION INTRAVENOUS CONTINUOUS PRN
Status: DISCONTINUED | OUTPATIENT
Start: 2022-08-25 | End: 2022-08-25

## 2022-08-25 RX ORDER — DIPHENHYDRAMINE HCL 25 MG
25 CAPSULE ORAL EVERY 6 HOURS PRN
Status: DISCONTINUED | OUTPATIENT
Start: 2022-08-25 | End: 2022-08-25 | Stop reason: HOSPADM

## 2022-08-25 RX ORDER — LIDOCAINE HYDROCHLORIDE 10 MG/ML
INJECTION, SOLUTION INFILTRATION; PERINEURAL PRN
Status: DISCONTINUED | OUTPATIENT
Start: 2022-08-25 | End: 2022-08-25

## 2022-08-25 RX ORDER — FENTANYL CITRATE 50 UG/ML
25 INJECTION, SOLUTION INTRAMUSCULAR; INTRAVENOUS EVERY 5 MIN PRN
Status: DISCONTINUED | OUTPATIENT
Start: 2022-08-25 | End: 2022-08-25 | Stop reason: HOSPADM

## 2022-08-25 RX ORDER — ONDANSETRON 2 MG/ML
4 INJECTION INTRAMUSCULAR; INTRAVENOUS EVERY 30 MIN PRN
Status: DISCONTINUED | OUTPATIENT
Start: 2022-08-25 | End: 2022-08-25 | Stop reason: HOSPADM

## 2022-08-25 RX ORDER — FENTANYL CITRATE 50 UG/ML
INJECTION, SOLUTION INTRAMUSCULAR; INTRAVENOUS PRN
Status: DISCONTINUED | OUTPATIENT
Start: 2022-08-25 | End: 2022-08-25

## 2022-08-25 RX ORDER — HYDRALAZINE HYDROCHLORIDE 20 MG/ML
2.5-5 INJECTION INTRAMUSCULAR; INTRAVENOUS EVERY 10 MIN PRN
Status: DISCONTINUED | OUTPATIENT
Start: 2022-08-25 | End: 2022-08-25 | Stop reason: HOSPADM

## 2022-08-25 RX ORDER — GLYCOPYRROLATE 0.2 MG/ML
INJECTION, SOLUTION INTRAMUSCULAR; INTRAVENOUS PRN
Status: DISCONTINUED | OUTPATIENT
Start: 2022-08-25 | End: 2022-08-25

## 2022-08-25 RX ADMIN — ROCURONIUM BROMIDE 45 MG: 50 INJECTION, SOLUTION INTRAVENOUS at 11:49

## 2022-08-25 RX ADMIN — HYDROMORPHONE HYDROCHLORIDE 1 MG: 1 INJECTION, SOLUTION INTRAMUSCULAR; INTRAVENOUS; SUBCUTANEOUS at 11:49

## 2022-08-25 RX ADMIN — HYDROMORPHONE HYDROCHLORIDE 0.5 MG: 1 INJECTION, SOLUTION INTRAMUSCULAR; INTRAVENOUS; SUBCUTANEOUS at 09:24

## 2022-08-25 RX ADMIN — ONDANSETRON 4 MG: 2 INJECTION INTRAMUSCULAR; INTRAVENOUS at 14:00

## 2022-08-25 RX ADMIN — MIDAZOLAM 2 MG: 1 INJECTION INTRAMUSCULAR; INTRAVENOUS at 11:46

## 2022-08-25 RX ADMIN — SODIUM CHLORIDE, POTASSIUM CHLORIDE, SODIUM LACTATE AND CALCIUM CHLORIDE: 600; 310; 30; 20 INJECTION, SOLUTION INTRAVENOUS at 11:35

## 2022-08-25 RX ADMIN — KETOROLAC TROMETHAMINE 30 MG: 30 INJECTION, SOLUTION INTRAMUSCULAR at 11:49

## 2022-08-25 RX ADMIN — FENTANYL CITRATE 100 MCG: 50 INJECTION, SOLUTION INTRAMUSCULAR; INTRAVENOUS at 11:49

## 2022-08-25 RX ADMIN — DEXAMETHASONE SODIUM PHOSPHATE 8 MG: 4 INJECTION, SOLUTION INTRA-ARTICULAR; INTRALESIONAL; INTRAMUSCULAR; INTRAVENOUS; SOFT TISSUE at 11:49

## 2022-08-25 RX ADMIN — GLYCOPYRROLATE 0.2 MG: 0.2 INJECTION, SOLUTION INTRAMUSCULAR; INTRAVENOUS at 11:49

## 2022-08-25 RX ADMIN — PROPOFOL 200 MG: 10 INJECTION, EMULSION INTRAVENOUS at 11:49

## 2022-08-25 RX ADMIN — ACETAMINOPHEN 975 MG: 325 TABLET, FILM COATED ORAL at 13:20

## 2022-08-25 RX ADMIN — ONDANSETRON 4 MG: 4 TABLET, ORALLY DISINTEGRATING ORAL at 03:46

## 2022-08-25 RX ADMIN — SUGAMMADEX 200 MG: 100 INJECTION, SOLUTION INTRAVENOUS at 12:30

## 2022-08-25 RX ADMIN — PROPOFOL 50 MCG/KG/MIN: 10 INJECTION, EMULSION INTRAVENOUS at 11:49

## 2022-08-25 RX ADMIN — OXYCODONE HYDROCHLORIDE 5 MG: 5 TABLET ORAL at 13:20

## 2022-08-25 RX ADMIN — ROCURONIUM BROMIDE 10 MG: 50 INJECTION, SOLUTION INTRAVENOUS at 12:06

## 2022-08-25 RX ADMIN — CLINDAMYCIN PHOSPHATE 900 MG: 900 INJECTION, SOLUTION INTRAVENOUS at 11:15

## 2022-08-25 RX ADMIN — LIDOCAINE HYDROCHLORIDE 50 MG: 10 INJECTION, SOLUTION INFILTRATION; PERINEURAL at 11:49

## 2022-08-25 RX ADMIN — ONDANSETRON HYDROCHLORIDE 4 MG: 2 INJECTION, SOLUTION INTRAVENOUS at 11:49

## 2022-08-25 RX ADMIN — LEVOTHYROXINE SODIUM 112 MCG: 0.11 TABLET ORAL at 08:18

## 2022-08-25 RX ADMIN — FENTANYL CITRATE 50 MCG: 50 INJECTION, SOLUTION INTRAMUSCULAR; INTRAVENOUS at 12:00

## 2022-08-25 RX ADMIN — ONDANSETRON 4 MG: 2 INJECTION INTRAMUSCULAR; INTRAVENOUS at 09:25

## 2022-08-25 RX ADMIN — INDOCYANINE GREEN AND WATER 2.5 MG: KIT at 11:16

## 2022-08-25 RX ADMIN — FENTANYL CITRATE 25 MCG: 50 INJECTION, SOLUTION INTRAMUSCULAR; INTRAVENOUS at 13:12

## 2022-08-25 RX ADMIN — HYDROMORPHONE HYDROCHLORIDE 0.5 MG: 1 INJECTION, SOLUTION INTRAMUSCULAR; INTRAVENOUS; SUBCUTANEOUS at 03:46

## 2022-08-25 RX ADMIN — ROCURONIUM BROMIDE 5 MG: 50 INJECTION, SOLUTION INTRAVENOUS at 11:46

## 2022-08-25 ASSESSMENT — ACTIVITIES OF DAILY LIVING (ADL)
ADLS_ACUITY_SCORE: 31

## 2022-08-25 NOTE — ANESTHESIA POSTPROCEDURE EVALUATION
Patient: Sherri Lynn    Procedure: Procedure(s):  CHOLECYSTECTOMY, LAPAROSCOPIC       Anesthesia Type:  General    Note:  Disposition: Outpatient   Postop Pain Control: Uneventful            Sign Out: Well controlled pain   PONV: No   Neuro/Psych: Uneventful            Sign Out: Acceptable/Baseline neuro status   Airway/Respiratory: Uneventful            Sign Out: Acceptable/Baseline resp. status   CV/Hemodynamics: Uneventful            Sign Out: Acceptable CV status; No obvious hypovolemia; No obvious fluid overload   Other NRE: NONE   DID A NON-ROUTINE EVENT OCCUR? No           Last vitals:  Vitals Value Taken Time   /102 08/25/22 1300   Temp 97.8  F (36.6  C) 08/25/22 1245   Pulse 101 08/25/22 1314   Resp 11 08/25/22 1314   SpO2 98 % 08/25/22 1314   Vitals shown include unvalidated device data.    Electronically Signed By: Adonis Chung MD  August 25, 2022  1:16 PM

## 2022-08-25 NOTE — PLAN OF CARE
PRIMARY DIAGNOSIS: Hepatic Steatosis and Hepatomegaly  OUTPATIENT/OBSERVATION GOALS TO BE MET BEFORE DISCHARGE:  1. ADLs back to baseline: Yes    2. Activity and level of assistance: Ambulating independently.    3. Pain status: Controlled, with IV narcotics    4. Return to near baseline physical activity: Yes     Discharge Planner Nurse   Safe discharge environment identified: Yes  Barriers to discharge: Yes       Entered by: Pratibha Palma RN 08/25/2022 4:20 AM   A/O x4 and makes needs known. Pt was admitted here for RUQ abdominal pain that has been going on for weeks. In the ED, CT revealed hepatic steatosis, hepatomegaly, and gallbladder sludge. AST and ALT were also elevated. Independent of mobility in the room, verbalizes that she is sleepy and is currently sleeping. Denies pain and states that IV dilaudid was effective that was given in the ED. Full liquid diet, NPO at 0000. Pt is a BG check, but has an application on her phone that is scanned and has a patient care order to use this with the exception if BG < 75. Enteric precautions maintained, stool sample needed. Pt has a dressing to the right axilla for PTA procedure, C/D/I.  POC: Pain control, NPO at 0000, general surgery consult.  Please review provider order for any additional goals.   Nurse to notify provider when observation goals have been met and patient is ready for discharge.

## 2022-08-25 NOTE — DISCHARGE INSTRUCTIONS
HOME CARE FOLLOWING LAPAROSCOPIC CHOLECYSTECTOMY  BOUCHRA Gamez, HOLLY Larson R. O Donnell, J. Shaheen    INCISIONAL CARE:  Replace the bandage over your incisions DAILY until all drainage stops, or if more comfortable to have in place.  If present, leave the steri-strips (white paper tapes) in place for 14 days after surgery.  If Dermabond (a type of skin glue) is present, leave in place until it wears/flakes off (2-3 weeks).     BATHING:  OK to shower 48 hours after surgery.  Avoid baths for 1 week after surgery.  You may wash your hair at any time.  Gently pat your incision dry after bathing.  Do not apply lotions, creams, or ointments to incisions.    ACTIVITY:  Light Activity -- you may immediately be up and about as tolerated.  Walking is encouraged, increase as tolerated.  Driving/Light Work-- when comfortable and off narcotic pain medications.  Strenuous Work/Activity -- limit lifting to 20 pounds for 2 weeks.  Progressively increase with time.  Active Sports (running, biking, etc.) -- cautiously resume after 2 weeks.    DISCOMFORT:  Local anesthetic placed at surgery should provide relief for 4-8 hours.  Begin taking pain pills before discomfort is severe.  Take the pain medication with some food, when possible, to minimize side effects.  Intermittent use of ice packs may help during the first 1-3 weeks after surgery.  Expect gradual improvement.    Over-the-counter anti-inflammatory medications (i.e. Ibuprofen/Advil/Motrin or Naprosyn/Aleve) may be used per package instructions in addition to or while tapering off the narcotic pain medications to decrease swelling and sensitivity.  DO NOT TAKE these Anti-inflammatory medications if your primary physician has advised against doing so, or if you have acid reflux, ulcer, or bleeding disorder, or take blood-thinner medications.  Call your primary physician or the surgery office if you have medication questions.    After laparoscopic  cholecystectomy, you may have shoulder or upper back discomfort due to the gas used during surgery.  This is temporary and should resolve within 2-3 days.  Frequent short walks may help with this.  You may have decreased energy level for 1-2 weeks after surgery related to your recovery.    DIET:  Start with liquids and gradually increase diet as tolerated.  Drink plenty of fluids.  While taking pain medications, consider use of a stool softener, increase your fiber in your diet, or add a fiber supplement (like Metamucil, Citrucel) to help prevent constipation - a possible side effect of pain medications.  It is not uncommon to experience some bowel changes (loose stools or constipation) after surgery.  Your body has to adapt to you no longer having a gall bladder.  To help minimize this side effect, avoid fatty foods for 1-2 weeks after surgery.  You may then slowly increase the amount of fatty foods in your diet.      NAUSEA:  If nauseated from the anesthetic/pain meds; rest in bed, get up cautiously with assistance, and drink clear liquids (juice, tea, broth).    FOLLOW-UP AFTER SURGERY:  -Our office will contact you approximately 2-3 weeks after surgery to check on your progress and answer any questions you may have.  If you are doing well, you will not need to return for an office appointment.  If any concerns are identified over the phone, we will help you make an appointment to see a provider.    -If you have not received a phone call, have any questions or concerns, or would like to be seen, please call us at [phone].  We are located at: [address], [address]; [place] [address]    -CONTACT US IF THE FOLLOWING DEVELOPS:   1. A fever that is above 101     2. Increased redness, warmth, drainage, bleeding, or swelling.   3. Pain that is not relieved by rest/ice and your prescription.   4.  Increasing pain after 48 hours.   5. Drainage that is thick, cloudy, yellow, green or white.   6. Any other  questions or concerns.      FREQUENTLY ASKED QUESTIONS:    Q:  How should my incision look?    A:  Normally your incision will appear slightly swollen with light redness directly along the incision itself as it heals.  It may feel like a bump or ridge as the healing/scarring happens, and over time (3-4 months) this bump or ridge feeling should slowly go away.  In general, clear or pink watery drainage can be normal at first as your incision heals, but should decrease over time.    Q:  How do I know if my incision is infected?  A:  Look at your incision for signs of infection, like redness around the incision spreading to surrounding skin, or drainage of cloudy or foul-smelling drainage.  If you feel warm, check your temperature to see if you are running a fever.    **If any of these things occur, please notify the nurse at our office.  We may need you to come into the office for an incision check.      Q:  How do I take care of my incision?  A:  If you have a dressing in place - Starting the day after surgery, replace the dressing 1-2 times a day until there is no further drainage from the incision.  At that time, a dressing is no longer needed.  Try to minimize tape on the skin if irritation is occurring at the tape sites.  If you have significant irritation from tape on the skin, please call the office to discuss other method of dressing your incision.    Small pieces of tape called  steri-strips  may be present directly overlying your incision; these may be removed 10 days after surgery unless otherwise specified by your surgeon.  If these tapes start to loosen at the ends, you may trim them back until they fall off or are removed.    A:  If you had  Dermabond  tissue glue used as a dressing (this causes your incision to look shiny with a clear covering over it) - This type of dressing wears off with time and does not require more dressings over the top unless it is draining around the glue as it wears off.  Do  not apply ointments or lotions over the incisions until the glue has completely worn off.    Q:  There is a piece of tape or a sticky  lead  still on my skin.  Can I remove this?  A:  Sometimes the sticky  leads  used for monitoring during surgery or for evaluation in the emergency department are not all removed while you are in the hospital.  These sometimes have a tab or metal dot on them.  You can easily remove these on your own, like taking off a band-aid.  If there is a gel substance under the  lead , simply wipe/clean it off with a washcloth or paper towel.      Q:  What can I do to minimize constipation (very hard stools, or lack of stools)?  A:  Stay well hydrated.  Increase your dietary fiber intake or take a fiber supplement -with plenty of water.  Walk around frequently.  You may consider an over-the-counter stool-softener.  Your Pharmacist can assist you with choosing one that is stocked at your pharmacy.  Constipation is also one of the most common side effects of pain medication.  If you are using pain medication, be pro-active and try to PREVENT problems with constipation by taking the steps above BEFORE constipation becomes a problem.    Q:  What do I do if I need more pain medications?  A:  Call the office to receive refills.  Be aware that certain pain meds cannot be called into a pharmacy and actually require a paper prescription.  A change may be made in your pain med as you progress thru your recovery period or if you have side effects to certain meds.    --Pain meds are NOT refilled after 5pm on weekdays, and NOT AT ALL on the weekends, so please look ahead to prevent problems.      Q:  Why am I having a hard time sleeping now that I am at home?  A:  Many medications you receive while you are in the hospital can impact your sleep for a number of days after your surgery/hospitalization.  Decreased level of activity and naps during the day may also make sleeping at night difficult.  Try to  minimize day-time naps, and get up frequently during the day to walk around your home during your recovery time.  Sleep aides may be of some help, but are not recommended for long-term use.      Q:  I am having some back discomfort.  What should I do?  A:  This may be related to certain positioning that was required for your surgery, extended periods of time in bed, or other changes in your overall activity level.  You may try ice, heat, acetaminophen, or ibuprofen to treat this temporarily.  Note that many pain medications have acetaminophen in them and would state this on the prescription bottle.  Be sure not to exceed the maximum of 4000mg per day of acetaminophen.     **If the pain you are having does not resolve, is severe, or is a flare of back pain you have had on other occasions prior to surgery, please contact your primary physician for further recommendations or for an appointment to be examined at their office.    Q:  Why am I having headaches?  A:  Headaches can be caused by many things:  caffeine withdrawal, use of pain meds, dehydration, high blood pressure, lack of sleep, over-activity/exhaustion, flare-up of usual migraine headaches.  If you feel this is related to muscle tension (a band-like feeling around the head, or a pressure at the low-back of the head) you may try ice or heat to this area.  You may need to drink more fluids (try electrolyte drink like Gatorade), rest, or take your usual migraine medications.   **If your headaches do not resolve, worsen, are accompanied by other symptoms, or if your blood pressure is high, please call your primary physician for recommendation and/or examination.    Q:  I am unable to urinate.  What do I do?  A:  A small percentage of people can have difficulty urinating initially after surgery.  This includes being able to urinate only a very small amount at a time and feeling discomfort or pressure in the very low abdomen.  This is called  urinary retention ,  and is actually an urgent situation.  Proceed to your nearest Emergency department for evaluation (not an Urgent Care Center).  Sometimes the bladder does not work correctly after certain medications you receive during surgery, or related to certain procedures.  You may need to have a catheter placed until your bladder recovers.  When planning to go to an Emergency department, it may help to call the ER to let them know you are coming in for this problem after a surgery.  This may help you get in quicker to be evaluated.  **If you have symptoms of a urinary tract infection, please contact your primary physician for the proper evaluation and treatment.          If you have other questions, please call the office Monday thru Friday between 8am and 4:30pm to discuss with the nurse or physician assistant.  [phone]    There is a surgeon ON CALL on weekday evenings and over the weekend in case of urgent need only, and may be contacted at the same number.    If you are having an emergency, call 911 or proceed to your nearest emergency department.     GENERAL ANESTHESIA OR SEDATION ADULT DISCHARGE INSTRUCTIONS   SPECIAL PRECAUTIONS FOR 24 HOURS AFTER SURGERY    IT IS NOT UNUSUAL TO FEEL LIGHT-HEADED OR FAINT, UP TO 24 HOURS AFTER SURGERY OR WHILE TAKING PAIN MEDICATION.  IF YOU HAVE THESE SYMPTOMS; SIT FOR A FEW MINUTES BEFORE STANDING AND HAVE SOMEONE ASSIST YOU WHEN YOU GET UP TO WALK OR USE THE BATHROOM.    YOU SHOULD REST AND RELAX FOR THE NEXT 24 HOURS AND YOU MUST MAKE ARRANGEMENTS TO HAVE SOMEONE STAY WITH YOU FOR AT LEAST 24 HOURS AFTER YOUR DISCHARGE.  AVOID HAZARDOUS AND STRENUOUS ACTIVITIES.  DO NOT MAKE IMPORTANT DECISIONS FOR 24 HOURS.    DO NOT DRIVE ANY VEHICLE OR OPERATE MECHANICAL EQUIPMENT FOR 24 HOURS FOLLOWING THE END OF YOUR SURGERY.  EVEN THOUGH YOU MAY FEEL NORMAL, YOUR REACTIONS MAY BE AFFECTED BY THE MEDICATION YOU HAVE RECEIVED.    DO NOT DRINK ALCOHOLIC BEVERAGES FOR 24 HOURS FOLLOWING  YOUR SURGERY.    DRINK CLEAR LIQUIDS (APPLE JUICE, GINGER ALE, 7-UP, BROTH, ETC.).  PROGRESS TO YOUR REGULAR DIET AS YOU FEEL ABLE.    YOU MAY HAVE A DRY MOUTH, A SORE THROAT, MUSCLES ACHES OR TROUBLE SLEEPING.  THESE SHOULD GO AWAY AFTER 24 HOURS.    CALL YOUR DOCTOR FOR ANY OF THE FOLLOWING:  SIGNS OF INFECTION (FEVER, GROWING TENDERNESS AT THE SURGERY SITE, A LARGE AMOUNT OF DRAINAGE OR BLEEDING, SEVERE PAIN, FOUL-SMELLING DRAINAGE, REDNESS OR SWELLING.    IT HAS BEEN OVER 8 TO 10 HOURS SINCE SURGERY AND YOU ARE STILL NOT ABLE TO URINATE (PASS WATER).     Maximum acetaminophen (Tylenol) dose from all sources should not exceed 4 grams (4000 mg) per day. You received 975 mg of Tylenol at 1:20 PM.    You received Toradol, an IV form of Ibuprofen (Motrin) at 11:49 AM.  Do not take any Ibuprofen products until 5:49 PM.

## 2022-08-25 NOTE — PLAN OF CARE
PRIMARY DIAGNOSIS: BILIARY COLIC/UNCOMPLICATED EARLY ACUTE CHOLECYSTITIS  OUTPATIENT/OBSERVATION GOALS TO BE MET BEFORE DISCHARGE:    1. Pain status: Improved but still requiring IV narcotics.  2. Stable vital signs and labs (if performed) at disposition: Yes  3. Tolerating adequate PO diet:  NPO  4. Successful cholecystectomy or clear follow up plan with General Surgery team if immediate surgery not performed Yes  5. ADLs back to baseline?  Yes  6. Activity and level of assistance: Ambulating independently.  7. Barriers to discharge noted Yes general surgery consult pending    Discharge Planner Nurse   Safe discharge environment identified: Yes  Barriers to discharge: Yes       Entered by: BRANDON ALSTON RN 08/25/2022    Vital signs:  Temp: 98.9  F (37.2  C) Temp src: Oral BP: 109/72 Pulse: 79   Resp: 18 SpO2: 99 % O2 Device: None (Room air)   Alert and oriented x4. RUQ pain 5-6/10. Dilaudid IV given. IV Zofran for nausea given. Denied vomiting. General surgery consulted for possible lab coli. Pt NPO since midnight. IVF running @100mL/hr. On enteric precaution to rule out c-diff. Last BM per pt was on Tues. BG ACHS. Provider notified to change BG check order Q4 hrs due to NPO status. Pt has implanted BG monitoring device and okay to use that for BG monitoring here except pt is not hypoglycemic. Up independently. Will continue to monitor.     Please review provider order for any additional goals.   Nurse to notify provider when observation goals have been met and patient is ready for discharge.

## 2022-08-25 NOTE — OP NOTE
General Surgery Operative Note    Pre-operative diagnosis:  Biliary colic [K80.50]   Post-operative diagnosis: same   Procedure: Laparoscopic Cholecystectomy   Surgeon: Zechariah Huang MD   Assistant(s): Winsome Juares PA-C   Anesthesia: General    Estimated blood loss: 5 cc's   Drains placed: None   Complications:  None   Findings:   Gallbladder without obvious acute inflammation.     INDICATIONS FOR OPERATION: This is a patient with upper abdominal pain and gallbladder sludge.  Laparoscopic cholecystectomy was recommended.  The procedure along with its risks and complications was discussed in detail and the patient agreed to proceed.    DETAILS OF THE OPERATION: After informed consent the patient was taken to the operating room where she underwent satisfactory induction of general anesthesia.  The patient was then sterilely prepped and draped.  A supraumbilical skin incision was made using a skin knife.  The dissection was carried bluntly down to the fascia.  The fascia was opened using electrocautery and the Cowan trocar was then inserted.  Pneumoperitoneum was achieved using CO2 insufflation, and under direct visualization  three  5 mm upper abdominal ports were placed.  The gallbladder was visualized and was grasped. It was pulled up over the liver and the cystic duct was exposed.  ICG fluorescence imaging was used to facilitate visualization.  The cystic duct was skeletonized, triple clipped and divided.  The cystic artery was likewise triple clipped and divided, along with any posterior branches.  The gallbladder was then dissected away from the liver using electrocautery.  The gallbladder was then placed in an Endo Catch bag and removed through the supraumbilical incision.  The gallbladder fossa was irrigated out.  There was excellent hemostasis and the clips were in good position.  The trocar sites were now infiltrated with half percent Marcaine with epinephrine.  The trochars were removed under  direct visualization.  The supraumbilical fascia was then closed using 0 Vicryl suture.  Skin incisions were closed using 4-0 subcuticular Vicryl followed by Steri-Strips.    The patient was transferred to the recovery room in satisfactory condition.  Sponge and needle counts were correct at the close of the case.      Specimens:   ID Type Source Tests Collected by Time Destination   1 :  Tissue Gallbladder SURGICAL PATHOLOGY EXAM Zechariah Huang MD 8/25/2022 12:25 PM            Zechariah Huang MD

## 2022-08-25 NOTE — PLAN OF CARE
PRIMARY DIAGNOSIS: Hepatic Steatosis and Hepatomegaly  OUTPATIENT/OBSERVATION GOALS TO BE MET BEFORE DISCHARGE:  1. ADLs back to baseline: Yes    2. Activity and level of assistance: Ambulating independently.    3. Pain status: Controlled, with IV narcotics    4. Return to near baseline physical activity: Yes     Discharge Planner Nurse   Safe discharge environment identified: Yes  Barriers to discharge: Yes       Entered by: Pratibha Palma RN 08/25/2022 4:21 AM   A/O x4 and makes needs known. Pt was admitted here for RUQ abdominal pain that has been going on for weeks. In the ED, CT revealed hepatic steatosis, hepatomegaly, and gallbladder sludge. AST and ALT were also elevated. Independent of mobility in the room, verbalizes that she is sleepy and is currently sleeping. Pt was having nausea, PO Zofran given. Pt was also having pain 6/10; IV dilaudid given. Currently NPO. Pt is a BG check, but has an application on her phone that is scanned and has a patient care order to use this with the exception if BG < 75. Enteric precautions maintained, stool sample needed. Pt has a dressing to the right axilla for PTA procedure, C/D/I.  POC: Pain control, NPO status,  general surgery consult.  Pratibha Palma RN  Please review provider order for any additional goals.   Nurse to notify provider when observation goals have been met and patient is ready for discharge.

## 2022-08-25 NOTE — DISCHARGE SUMMARY
[facility]  Discharge Summary        Sherri Lynn MRN# [id number]   YOB: 1985 Age: 36 year old     Date of Admission:  8/24/2022  Date of Discharge:  8/25/2022  2:31 PM  Admitting Physician:  Huseyin Solo DO  Discharge Physician: Talisha Elias PA-C  Discharging Service: Hospitalist     Primary Provider: Megan Carlson  Primary Care Physician Phone Number: [phone]         Discharge Diagnoses/Problem Oriented Hospital Course (Providers):    Sherri Lynn was admitted on 8/24/2022 by Huseyin Solo DO and I would refer you to their history and physical.  The following problems were addressed during her hospitalization:    1. Acute cholecystitis. S/p Lap sebastien          Code Status:      Full Code        Brief Hospital Stay Summary Sent Home With Patient in AVS:        Reason for your hospital stay      You were admitted for concerns of biliary colic. You were seen by surgery   and taken to OR for lap sebastien. Follow up with your surgeon as directed.   Resume ASA once ok by surgery.                  Important Results:      No results for input(s): WBC, HGB, HCT, MCV, PLT in the last 168 hours.  No results for input(s): NA, POTASSIUM, CHLORIDE, CO2, ANIONGAP, GLC, BUN, CR, GFRESTIMATED, GFRESTBLACK, LONNIE in the last 168 hours.  No results for input(s): AST, ALT, GGT, ALKPHOS, BILITOTAL, BILICONJ, BILIDIRECT, MALIHA in the last 168 hours.    Invalid input(s): BILIRUBININDIRECT           Pending Results:        Unresulted Labs Ordered in the Past 30 Days of this Admission     Date and Time Order Name Status Description    8/23/2022  7:39 AM ZINC TRANSPORTER 8 ANTIBODY In process     8/23/2022  7:39 AM GLUTAMIC ACID DECARBOXYLASE ANTIBODY In process             Discharge Instructions and Follow-Up:      Follow-up Appointments     Follow-up and recommended labs and tests       Follow up with Dr. Huang as directed after surgery.   No follow up labs   or test are needed.                Discharge Disposition:      Discharged to home         Discharge Medications:        Current Discharge Medication List      CONTINUE these medications which have NOT CHANGED    Details   albuterol (PROAIR HFA/PROVENTIL HFA/VENTOLIN HFA) 108 (90 Base) MCG/ACT inhaler Inhale 2 puffs into the lungs every 6 hours  Qty: 18 g, Refills: 3    Comments: Pharmacy may dispense brand covered by insurance (Proair, or proventil or ventolin or generic albuterol inhaler)  Associated Diagnoses: Mild intermittent asthma without complication      atorvastatin (LIPITOR) 40 MG tablet Take 1 tablet (40 mg) by mouth daily  Qty: 90 tablet, Refills: 4    Associated Diagnoses: Diabetes mellitus treated with oral medication (H)      diphenhydrAMINE HCl 12.5 MG CHEW Take 12.5 mg by mouth daily as needed      escitalopram (LEXAPRO) 20 MG tablet Take 1 tablet (20 mg) by mouth daily  Qty: 90 tablet, Refills: 3    Comments: Start 10 mg for one wk then increase to 20 mg after that  Associated Diagnoses: Current severe episode of major depressive disorder without psychotic features without prior episode (H)      etonogestrel (NEXPLANON) 68 MG IMPL 1 each      levothyroxine (SYNTHROID/LEVOTHROID) 112 MCG tablet Take 1 tablet (112 mcg) by mouth daily  Qty: 90 tablet, Refills: 1    Associated Diagnoses: Acquired hypothyroidism      metFORMIN (GLUCOPHAGE XR) 500 MG 24 hr tablet Take 4 tablets (2,000 mg) by mouth daily (with dinner) for 90 days  Qty: 360 tablet, Refills: 4    Associated Diagnoses: Diabetes mellitus treated with oral medication (H)      multivitamin w/minerals (THERA-VIT-M) tablet Take 1 tablet by mouth daily      Probiotic Product (PROBIOTIC-10 ULTIMATE) CAPS Take 1 capsule by mouth daily      alcohol swab prep pads Use to swab area of injection/jaye as directed.  Qty: 100 each, Refills: 3    Associated Diagnoses: Diabetes mellitus treated with oral medication (H)      blood glucose (NO BRAND SPECIFIED) test strip Use to test  blood sugar twice daily   times daily or as directed. To accompany: Blood Glucose Monitor Brands: per insurance.  Qty: 100 strip, Refills: 6    Associated Diagnoses: Diabetes mellitus treated with oral medication (H)      blood glucose calibration (NO BRAND SPECIFIED) solution To accompany: Blood Glucose Monitor Brands: per insurance.  Qty: 1 each, Refills: 4    Associated Diagnoses: Diabetes mellitus treated with oral medication (H)      blood glucose monitoring (NO BRAND SPECIFIED) meter device kit Use to test blood sugar bid  times daily or as directed. Preferred blood glucose meter OR supplies to accompany: Blood Glucose Monitor Brands: per insurance.  Qty: 1 kit, Refills: 0    Associated Diagnoses: Diabetes mellitus treated with oral medication (H)      Continuous Blood Gluc Sensor (FREESTYLE KHADIJAH 2 SENSOR) MISC 1 Device every 14 days  Qty: 6 each, Refills: 3    Associated Diagnoses: Diabetes mellitus treated with oral medication (H)      thin (NO BRAND SPECIFIED) lancets Use with lanceting device BID. To accompany: Blood Glucose Monitor Brands: per insurance.  Qty: 100 each, Refills: 6    Associated Diagnoses: Diabetes mellitus treated with oral medication (H)         STOP taking these medications       aspirin (ASA) 81 MG EC tablet Comments:   Reason for Stopping:                 Allergies:         Allergies   Allergen Reactions     Adhesive Tape      Augmentin      Azathioprine      Azathioprine Other (See Comments)     pancreatitits     Certolizumab Pegol Hives     Flu Virus Vaccine      Humira Swelling     Keflex [Cephalexin Hcl]      Pneumococcal Vaccines      Sulfa Drugs Itching     rashes     Vedolizumab Fatigue, Headache, Nausea and Photosensitivity     Zithromax [Azithromycin Dihydrate]      Penicillins Rash           Consultations This Hospital Stay:      Consultation during this admission received from surgery         Condition and Physical on Discharge:      Discharge condition: Stable   Vitals:  "Blood pressure 116/84, pulse 79, temperature 98.2  F (36.8  C), temperature source Temporal, resp. rate 16, height 1.6 m (5' 3\"), weight 91.4 kg (201 lb 8 oz), SpO2 97 %, not currently breastfeeding.  201 lbs 8 oz      GENERAL:  Comfortable.  PSYCH: pleasant, oriented, No acute distress.  HEENT:  PERRLA. Normal conjunctiva, normal hearing, nasal mucosa and Oropharynx are normal.  NECK:  Supple, no neck vein distention, adenopathy or bruits, normal thyroid.  HEART:  Normal S1, S2 with no murmur, no pericardial rub, gallops or S3 or S4.  LUNGS:  Clear to auscultation, normal Respiratory effort. No wheezing, rales or ronchi.  ABDOMEN:  Soft, no hepatosplenomegaly, normal bowel sounds. Non-tender, non distended.   EXTREMITIES:  No pedal edema, +2 pulses bilateral and equal.  SKIN:  Dry to touch, No rash, wound or ulcerations.  NEUROLOGIC:  CN 2-12 intact, BL 5/5 symmetric upper and lower extremity strength, sensation is intact with no focal deficits.         Discharge Time:      <30 mins         Image Results From This Hospital Stay (For Non-EPIC Providers):        Results for orders placed or performed during the hospital encounter of 08/24/22   Abdomen US, limited (RUQ only)    Narrative    ULTRASOUND ABDOMEN LIMITED August 24, 2022 12:45 PM     HISTORY: Right upper quadrant abdominal pain.    COMPARISON: CT abdomen/pelvis on 10/15/2020.    FINDINGS: Technically challenging exam due to patient's body habitus  and overlying bowel gas.    Gallbladder: Gallbladder sludge. No shadowing gallstones, gallbladder  wall thickening or pericholecystic fluid. Sonographic Valentino's sign is  negative.    Bile ducts: CHD is normal diameter. No intrahepatic biliary  dilatation. The distal aspect of the common bile duct is obscured by  overlying bowel gas.    Liver: It demonstrates increased parenchymal echogenicity. No focal  hepatic mass is visualized.    Pancreas: Partially obscured by overlying bowel gas,  but " grossly  unremarkable.     Right kidney: No hydronephrosis or shadowing calculi.    Aorta and IVC: Not specifically assessed.       Impression    IMPRESSION:    1. Hepatic steatosis.  2. Gallbladder sludge without sonographic evidence of acute  cholecystitis.    TAMMY HAZEL MD         SYSTEM ID:  [id number]   CT Abdomen Pelvis w Contrast    Narrative    CT ABDOMEN PELVIS W CONTRAST 8/24/2022 3:13 PM    CLINICAL HISTORY: RUQ and epigastric pain; concern for pancreatitis    TECHNIQUE: CT scan of the abdomen and pelvis was performed following  injection of IV contrast. Multiplanar reformats were obtained. Dose  reduction techniques were used.  CONTRAST:  90mL Isovue-[id number]    COMPARISON: Same date abdominal ultrasound, CT 10/15/2020    FINDINGS:   LOWER CHEST: Unremarkable.    HEPATOBILIARY: Diffuse hepatic steatosis and hepatomegaly.    PANCREAS: Normal.    SPLEEN: Mild splenomegaly.    ADRENAL GLANDS: Normal.    KIDNEYS/BLADDER: No hydronephrosis. Urinary bladder is unremarkable.    BOWEL: No obstruction or inflammatory change.    PELVIC ORGANS: Stable size of left adnexal lesion, possibly a cyst  measuring 3.6 cm (series 3 image 175).    ADDITIONAL FINDINGS: None.    MUSCULOSKELETAL: Subtle sclerosis in the femoral heads bilaterally,  right greater than left, could represent early avascular necrosis  without collapse.      Impression    IMPRESSION:   1.  Diffuse hepatic steatosis and hepatomegaly, which is a possible  source of abdominal pain.  2.  No evidence of cholecystitis or pancreatitis.  3.  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 No evidence of  collapse or secondary degenerative changes.  4.  Stable size of 3.6 cm left adnexal lesion, most likely a cyst but  consider further evaluation with pelvic ultrasound on a nonemergent  basis.    TREY MABRY MD         SYSTEM ID:  [id number]

## 2022-08-25 NOTE — PLAN OF CARE
PRIMARY DIAGNOSIS: Hepatic Steatosis and Hepatomegaly  OUTPATIENT/OBSERVATION GOALS TO BE MET BEFORE DISCHARGE:  ADLs back to baseline: Yes    Activity and level of assistance: Ambulating independently.    Pain status: Controlled, with IV narcotics    Return to near baseline physical activity: Yes     Discharge Planner Nurse   Safe discharge environment identified: Yes  Barriers to discharge: Yes       Entered by: Pratibha Palma RN 08/24/2022 9:12 PM   A/O x4 and makes needs known. Pt was admitted here for RUQ abdominal pain that has been going on for weeks. In the ED, CT revealed hepatic steatosis, hepatomegaly, and gallbladder sludge. AST and ALT were also elevated. Independent of mobility in the room, verbalizes that she is sleepy and is currently sleeping. Denies pain and states that IV dilaudid was effective that was given in the ED. Full liquid diet, NPO at 0000. Pt is a BG check, but has an application on her phone that is scanned and has a patient care order to use this with the exception if BG < 75. Enteric precautions maintained, stool sample needed. Pt has a dressing to the right axilla for PTA procedure, C/D/I.  POC: Pain control, NPO at 0000, general surgery consult.  Please review provider order for any additional goals.   Nurse to notify provider when observation goals have been met and patient is ready for discharge.

## 2022-08-25 NOTE — ANESTHESIA PREPROCEDURE EVALUATION
Anesthesia Pre-Procedure Evaluation    Patient: Sherri Lynn   MRN: [id number] : 1985        Procedure : Procedure(s):  CHOLECYSTECTOMY, LAPAROSCOPIC          Past Medical History:   Diagnosis Date     Asthma      Crohn's ileitis, unspecified complication (H)       Past Surgical History:   Procedure Laterality Date     RELEASE CARPAL TUNNEL Left       Allergies   Allergen Reactions     Adhesive Tape      Augmentin      Azathioprine      Azathioprine Other (See Comments)     pancreatitits     Certolizumab Pegol Hives     Flu Virus Vaccine      Humira Swelling     Keflex [Cephalexin Hcl]      Pneumococcal Vaccines      Sulfa Drugs Itching     rashes     Vedolizumab Fatigue, Headache, Nausea and Photosensitivity     Zithromax [Azithromycin Dihydrate]      Penicillins Rash      Social History     Tobacco Use     Smoking status: Never Smoker     Smokeless tobacco: Never Used   Substance Use Topics     Alcohol use: Yes      Wt Readings from Last 1 Encounters:   22 91.4 kg (201 lb 8 oz)        Anesthesia Evaluation   Pt has had prior anesthetic. Type: General.    No history of anesthetic complications       ROS/MED HX  ENT/Pulmonary:     (+) BILLIE risk factors, snores loudly, obese, Intermittent, asthma Treatment: Inhaler prn,      Neurologic:  - neg neurologic ROS     Cardiovascular:  - neg cardiovascular ROS     METS/Exercise Tolerance:     Hematologic:  - neg hematologic  ROS     Musculoskeletal:  - neg musculoskeletal ROS     GI/Hepatic: Comment: Hx of colitis.  Ruling out C.Dif currently    (+) liver disease,     Renal/Genitourinary: Comment: Class 2      Endo:     (+) type II DM, Not using insulin, - not using insulin pump. Obesity,     Psychiatric/Substance Use:     (+) psychiatric history depression and anxiety     Infectious Disease:  - neg infectious disease ROS     Malignancy:  - neg malignancy ROS     Other:  - neg other ROS          Physical Exam    Airway        Mallampati: II   TM  distance: > 3 FB   Neck ROM: full   Mouth opening: > 3 cm    Respiratory Devices and Support         Dental  no notable dental history         Cardiovascular   cardiovascular exam normal       Rhythm and rate: regular and normal     Pulmonary   pulmonary exam normal        breath sounds clear to auscultation       Other findings: Lab Test        08/25/22 08/24/22 08/02/22                       0656          1050          1554          WBC          7.8          9.1          8.6           HGB          11.3*        12.0         12.3          MCV          101*         97           97            PLT          278          285          235            Lab Test        08/25/22 08/24/22 08/23/22 08/04/22                       0656          1050          0745          0744          NA           139          137           --          133*          POTASSIUM    4.0          3.9           --          4.4           CHLORIDE     103          101           --          97*           CO2          25 24 --          22            BUN          6.4          7.9           --          10.3          CR           0.74         0.66          --          0.64          ANIONGAP     11           12            --          14            LONNIE          9.0          10.1*         --          9.8           GLC          163*         172*         143*         202*                   Nl ECG in 2016    OUTSIDE LABS:  CBC:   Lab Results   Component Value Date    WBC 7.8 08/25/2022    WBC 9.1 08/24/2022    HGB 11.3 (L) 08/25/2022    HGB 12.0 08/24/2022    HCT 36.7 08/25/2022    HCT 37.3 08/24/2022     08/25/2022     08/24/2022     BMP:   Lab Results   Component Value Date     08/25/2022     08/24/2022    POTASSIUM 4.0 08/25/2022    POTASSIUM 3.9 08/24/2022    CHLORIDE 103 08/25/2022    CHLORIDE 101 08/24/2022    CO2 25 08/25/2022    CO2 24 08/24/2022    BUN 6.4 08/25/2022    BUN 7.9 08/24/2022     CR 0.74 08/25/2022    CR 0.66 08/24/2022     (H) 08/25/2022     (H) 08/24/2022     COAGS: No results found for: PTT, INR, FIBR  POC:   Lab Results   Component Value Date    HCG Negative 06/18/2010    HCGS Negative 08/24/2022     HEPATIC:   Lab Results   Component Value Date    ALBUMIN 4.2 08/25/2022    PROTTOTAL 7.2 08/25/2022     (H) 08/25/2022     (H) 08/25/2022    ALKPHOS 85 08/25/2022    BILITOTAL 0.3 08/25/2022     OTHER:   Lab Results   Component Value Date    LACT 1.5 05/30/2022    A1C 8.0 (H) 08/02/2022    LONNIE 9.0 08/25/2022    PHOS 3.7 08/04/2022    MAG 1.8 08/04/2022    LIPASE 52 08/24/2022    TSH 58.32 (H) 08/04/2022    T4 0.36 (L) 08/04/2022    CRP 7.44 (H) 08/02/2022    SED 27 (H) 03/26/2019       Anesthesia Plan    ASA Status:  2   NPO Status:  NPO Appropriate    Anesthesia Type: General.     - Airway: ETT   Induction: Intravenous.   Maintenance: Balanced.        Consents    Anesthesia Plan(s) and associated risks, benefits, and realistic alternatives discussed. Questions answered and patient/representative(s) expressed understanding.     - Discussed: Risks, Benefits and Alternatives for BOTH SEDATION and the PROCEDURE were discussed     - Discussed with:  Patient      - Extended Intubation/Ventilatory Support Discussed: No.      - Patient is DNR/DNI Status: No    Use of blood products discussed: No .     Postoperative Care    Pain management: IV analgesics, Oral pain medications, Multi-modal analgesia.   PONV prophylaxis: Ondansetron (or other 5HT-3), Dexamethasone or Solumedrol, Background Propofol Infusion     Comments:                Adonis Chung MD

## 2022-08-25 NOTE — PROGRESS NOTES
Care Management Note    Discharge Date: 08/26/2022     Discharge Disposition:  Home    Handoff Referral Completed: Yes    Additional Information:  Pt identified as a Service Bundle #2. No needs or assessment needed at this time. Please consult CM/SW  if discharge needs should arise.    GLORIA Ivey, [facility]   Inpatient Care Coordination  [facility]   [phone]

## 2022-08-25 NOTE — ANESTHESIA CARE TRANSFER NOTE
Patient: Sherri Lynn    Procedure: Procedure(s):  CHOLECYSTECTOMY, LAPAROSCOPIC       Diagnosis: Biliary colic [K80.50]  Diagnosis Additional Information: No value filed.    Anesthesia Type:   General     Note:    Oropharynx: oropharynx clear of all foreign objects  Level of Consciousness: awake  Oxygen Supplementation: face mask  Level of Supplemental Oxygen (L/min / FiO2): 6  Independent Airway: airway patency satisfactory and stable  Dentition: dentition unchanged  Vital Signs Stable: post-procedure vital signs reviewed and stable  Report to RN Given: handoff report given  Patient transferred to: PACU    Handoff Report: Identifed the Patient, Identified the Reponsible Provider, Reviewed the pertinent medical history, Discussed the surgical course, Reviewed Intra-OP anesthesia mangement and issues during anesthesia, Set expectations for post-procedure period and Allowed opportunity for questions and acknowledgement of understanding      Vitals:  Vitals Value Taken Time   /95 08/25/22 1245   Temp 97.8  F (36.6  C) 08/25/22 1245   Pulse 87 08/25/22 1249   Resp 11 08/25/22 1249   SpO2 100 % 08/25/22 1249   Vitals shown include unvalidated device data.    Electronically Signed By: CODY Rose CRNA  August 25, 2022  12:50 PM

## 2022-08-25 NOTE — ANESTHESIA PROCEDURE NOTES
Airway       Patient location during procedure: OR       Procedure Start/Stop Times: 8/25/2022 11:50 AM  Staff -        CRNA: Sherwin Martinse APRN CRNA       Performed By: CRNA  Consent for Airway        Urgency: elective  Indications and Patient Condition       Indications for airway management: blaine-procedural       Induction type:intravenous       Mask difficulty assessment: 1 - vent by mask    Final Airway Details       Final airway type: endotracheal airway       Successful airway: ETT - single  Endotracheal Airway Details        ETT size (mm): 7.0       Cuffed: yes       Successful intubation technique: direct laryngoscopy       DL Blade Type: Luciano 2       Grade View of Cords: 1       Adjucts: stylet       Position: Right       Measured from: lips       Secured at (cm): 22    Post intubation assessment        Placement verified by: capnometry, equal breath sounds and chest rise        Number of attempts at approach: 1       Number of other approaches attempted: 0       Secured with: plastic tape       Ease of procedure: easy       Dentition: Intact and Unchanged    Medication(s) Administered   Medication Administration Time: 8/25/2022 11:50 AM

## 2022-08-26 LAB
GAD65 AB SER IA-ACNC: <5 IU/ML
PATH REPORT.COMMENTS IMP SPEC: NORMAL
PATH REPORT.COMMENTS IMP SPEC: NORMAL
PATH REPORT.FINAL DX SPEC: NORMAL
PATH REPORT.GROSS SPEC: NORMAL
PATH REPORT.MICROSCOPIC SPEC OTHER STN: NORMAL
PATH REPORT.RELEVANT HX SPEC: NORMAL
PHOTO IMAGE: NORMAL
ZNT8 AB SERPL IA-ACNC: <10 U/ML

## 2022-08-29 ENCOUNTER — OFFICE VISIT (OUTPATIENT)
Dept: FAMILY MEDICINE | Facility: CLINIC | Age: 37
End: 2022-08-29
Payer: COMMERCIAL

## 2022-08-29 VITALS
WEIGHT: 204.4 LBS | BODY MASS INDEX: 36.21 KG/M2 | HEART RATE: 76 BPM | SYSTOLIC BLOOD PRESSURE: 110 MMHG | DIASTOLIC BLOOD PRESSURE: 66 MMHG

## 2022-08-29 DIAGNOSIS — E03.9 ACQUIRED HYPOTHYROIDISM: ICD-10-CM

## 2022-08-29 DIAGNOSIS — Z79.84 DIABETES MELLITUS TREATED WITH ORAL MEDICATION (H): Primary | ICD-10-CM

## 2022-08-29 DIAGNOSIS — M19.90 INFLAMMATORY ARTHRITIS: ICD-10-CM

## 2022-08-29 DIAGNOSIS — F32.2 CURRENT SEVERE EPISODE OF MAJOR DEPRESSIVE DISORDER WITHOUT PSYCHOTIC FEATURES WITHOUT PRIOR EPISODE (H): ICD-10-CM

## 2022-08-29 DIAGNOSIS — M87.052 AVASCULAR NECROSIS OF BONES OF BOTH HIPS (H): ICD-10-CM

## 2022-08-29 DIAGNOSIS — J45.20 MILD INTERMITTENT ASTHMA WITHOUT COMPLICATION: ICD-10-CM

## 2022-08-29 DIAGNOSIS — K76.0 HEPATIC STEATOSIS: ICD-10-CM

## 2022-08-29 DIAGNOSIS — E11.9 DIABETES MELLITUS TREATED WITH ORAL MEDICATION (H): Primary | ICD-10-CM

## 2022-08-29 DIAGNOSIS — Z90.49 S/P CHOLECYSTECTOMY: ICD-10-CM

## 2022-08-29 DIAGNOSIS — E66.01 MORBID OBESITY (H): ICD-10-CM

## 2022-08-29 DIAGNOSIS — M87.051 AVASCULAR NECROSIS OF BONES OF BOTH HIPS (H): ICD-10-CM

## 2022-08-29 DIAGNOSIS — Z02.9 ADMINISTRATIVE ENCOUNTER: ICD-10-CM

## 2022-08-29 PROCEDURE — 99215 OFFICE O/P EST HI 40 MIN: CPT | Performed by: FAMILY MEDICINE

## 2022-08-29 RX ORDER — ASPIRIN 81 MG/1
TABLET ORAL
COMMUNITY
Start: 2022-08-09

## 2022-08-29 ASSESSMENT — PATIENT HEALTH QUESTIONNAIRE - PHQ9
SUM OF ALL RESPONSES TO PHQ QUESTIONS 1-9: 11
10. IF YOU CHECKED OFF ANY PROBLEMS, HOW DIFFICULT HAVE THESE PROBLEMS MADE IT FOR YOU TO DO YOUR WORK, TAKE CARE OF THINGS AT HOME, OR GET ALONG WITH OTHER PEOPLE: SOMEWHAT DIFFICULT

## 2022-08-29 ASSESSMENT — ANXIETY QUESTIONNAIRES: GAD7 TOTAL SCORE: 9

## 2022-08-29 NOTE — LETTER
[facility]  [address] [facility] [id number] [phone]          August 29, 2022    RE:  Sherri BUTTERFIELD Shane                                                                                                                                                       [facility]  [place] [address]            To whom it may concern:    Sherri Lynn is under my professional care for    Diabetes mellitus treated with oral medication (H)  S/P cholecystectomy  Current severe episode of major depressive disorder without psychotic features without prior episode (H)  Acquired hypothyroidism  Avascular necrosis of bones of both hips (H)  Hepatic steatosis  Morbid obesity (H)  Inflammatory arthritis  Mild intermittent asthma without complication She  may return to work 9/9/22 after her work restriction which end 9/8/22 ( 2 wk post op for gall bladder surgery)    Sincerely,        Megan Carlson MD

## 2022-08-29 NOTE — PROGRESS NOTES
SUBJECTIVE:   CC: Sherri Lynn 36 year old   who presents for  Follow up Diabetes     I spent 40 minutes with the patient, >50% of which was in counseling regarding the patient's medical issues as noted above.  History of Present Illness       Mental Health Follow-up:  Patient presents to follow-up on Depression & Anxiety.Patient's depression since last visit has been:  Better  The patient is not having other symptoms associated with depression.  Patient's anxiety since last visit has been:  Better  The patient is not having other symptoms associated with anxiety.  Any significant life events: job concerns, financial concerns and health concerns  Patient is not feeling anxious or having panic attacks.  Patient has no concerns about alcohol or drug use.    Diabetes:   She presents for follow up of diabetes.  She is checking home blood glucose with a continuous glucose monitor.  She checks blood glucose before and after meals and at bedtime.  Blood glucose is sometimes over 200 and never under 70. She is aware of hypoglycemia symptoms including shakiness, weakness and other. She is concerned about other. She is having numbness in feet, excessive thirst and weight loss. The patient has not had a diabetic eye exam in the last 12 months.         Hypothyroidism:     Since last visit, patient describes the following symptoms::  Anxiety, Constipation, Dry skin, Fatigue, Loose stools and Weight loss    Weight loss::  6-10 lbs.    She eats 4 or more servings of fruits and vegetables daily.She consumes 0 sweetened beverage(s) daily.She exercises with enough effort to increase her heart rate 30 to 60 minutes per day.  She exercises with enough effort to increase her heart rate 5 days per week.   She is taking medications regularly.    Today's PHQ-9         PHQ-9 Total Score: 11    PHQ-9 Q9 Thoughts of better off dead/self-harm past 2 weeks :   Not at all    How difficult have these problems made it for you to do your  work, take care of things at home, or get along with other people: Somewhat difficult  Today's AMANDA-7 Score: 9      Wt Readings from Last 3 Encounters:   08/29/22 92.7 kg (204 lb 6.4 oz)   08/24/22 91.4 kg (201 lb 8 oz)   08/08/22 93.9 kg (207 lb 1.6 oz)             ASSESSMENT/PLAN:   Sherri was seen today for diabetes.    Diagnoses and all orders for this visit:    Diabetes mellitus treated with oral medication (H)  Comments:  Pt using Dexa com most of the sugars 73% on target, range  , currently using 2000mg of metformin tolerating it well   Orders:  -     Albumin Random Urine Quantitative with Creat Ratio; Future  -     Adult Eye  Referral; Future  -     Hemoglobin A1c; Future    Administrative encounter  Comments:  FMLA paper work done RTW 9/9/22    S/P cholecystectomy  Comments:  8/25/22, recovering well patient does have a restriction of no heavy lifting more than 20 pounds.  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 doing much better     Avascular necrosis of bones of both hips (H)  Comments:  on recent CT Scan , extensive use of steroid , for crohns, asthma, arthritits   Orders:  -     Orthopedic  Referral; Future    Hepatic steatosis  Comments:  Follow-up labs in October patient does have a fatty liver disease  Orders:  -     Hepatic panel (Albumin, ALT, AST, Bili, Alk Phos, TP); Future    Morbid obesity (H)  Comments:  Working on diet and exercise start walking again.  Currently following diabetic diet    Inflammatory arthritis    Mild intermittent asthma without complication  Comments:  Stable    Other orders  -     Pap Screen with HPV - recommended age 30 - 65 years; Future  -     Pneumococcal 20 Valent Conjugate (Prevnar 20);  Future        Patient has been advised of split billing requirements and indicates understanding: Yes    PHQ 6/1/2022 8/8/2022 8/23/2022   PHQ-9 Total Score 22 16 11   Q9: Thoughts of better off dead/self-harm past 2 weeks More than half the days Not at all Not at all   F/U: Thoughts of suicide or self-harm Yes - -   F/U: Self harm-plan Yes - -   F/U: Self-harm action No - -   F/U: Safety concerns No - -        Today's PHQ-2 Score:   PHQ-2 ( 1999 Pf[employer]) 8/23/2022   Q1: Little interest or pleasure in doing things -   Q2: Feeling down, depressed or hopeless -   PHQ-2 Score -   PHQ-2 Score Incomplete       Abuse: Current or Past (Physical, Sexual or Emotional) - No  Do you feel safe in your environment? Yes    Have you ever done Advance Care Planning? (For example, a Health Directive, POLST, or a discussion with a medical provider or your loved ones about your wishes): No, advance care planning information given to patient to review.  Advanced care planning was discussed at today's visit.    Social History     Tobacco Use     Smoking status: Never Smoker     Smokeless tobacco: Never Used   Substance Use Topics     Alcohol use: Yes   Weight management plan: Discussed healthy diet and exercise guidelines    She reports that she has never smoked. She has never used smokeless tobacco.      Reviewed orders with patient.  Reviewed health maintenance and updated orders accordingly - Yes  Lab work is in process  Labs reviewed in EPIC  BP Readings from Last 3 Encounters:   08/29/22 110/66   08/25/22 122/73   08/08/22 (!) 130/90    Wt Readings from Last 3 Encounters:   08/29/22 92.7 kg (204 lb 6.4 oz)   08/24/22 91.4 kg (201 lb 8 oz)   08/08/22 93.9 kg (207 lb 1.6 oz)                 Patient Active Problem List   Diagnosis     CARDIOVASCULAR SCREENING; LDL GOAL LESS THAN 160     Crohn's disease (H)     Carpal tunnel syndrome     Intermittent asthma     Administrative encounter     Lactose intolerance in adult     Inflammatory  arthritis     Current severe episode of major depressive disorder without psychotic features without prior episode (H)     Hidradenitis suppurativa     Morbid obesity (H)     History of carpal tunnel release     Diabetes mellitus, type 2 (H)     Diabetes mellitus treated with oral medication (H)     Biliary colic     Bony sclerosis     Hepatic steatosis     S/P cholecystectomy     Acquired hypothyroidism     Past Surgical History:   Procedure Laterality Date     LAPAROSCOPIC CHOLECYSTECTOMY N/A 8/25/2022    Procedure: CHOLECYSTECTOMY, LAPAROSCOPIC;  Surgeon: Zechariah Huang MD;  Location: [facility] OR     RELEASE CARPAL TUNNEL Left 2014       Social History     Tobacco Use     Smoking status: Never Smoker     Smokeless tobacco: Never Used   Substance Use Topics     Alcohol use: Yes     Family History   Problem Relation Age of Onset     Asthma Mother      Hypertension Mother      Asthma Sister      Asthma Brother            Current Outpatient Medications   Medication Sig Dispense Refill     albuterol (PROAIR HFA/PROVENTIL HFA/VENTOLIN HFA) 108 (90 Base) MCG/ACT inhaler Inhale 2 puffs into the lungs every 6 hours 18 g 3     atorvastatin (LIPITOR) 40 MG tablet Take 1 tablet (40 mg) by mouth daily 90 tablet 4     diphenhydrAMINE HCl 12.5 MG CHEW Take 12.5 mg by mouth daily as needed       escitalopram (LEXAPRO) 20 MG tablet Take 1 tablet (20 mg) by mouth daily 90 tablet 3     etonogestrel (NEXPLANON) 68 MG IMPL 1 each       levothyroxine (SYNTHROID/LEVOTHROID) 112 MCG tablet Take 1 tablet (112 mcg) by mouth daily 90 tablet 1     metFORMIN (GLUCOPHAGE XR) 500 MG 24 hr tablet Take 4 tablets (2,000 mg) by mouth daily (with dinner) for 90 days (Patient taking differently: Take 1,000 mg by mouth 2 times daily (with meals)) 360 tablet 4     multivitamin w/minerals (THERA-VIT-M) tablet Take 1 tablet by mouth daily       ondansetron (ZOFRAN ODT) 4 MG ODT tab Take 1 tablet (4 mg) by mouth every 8 hours as needed for nausea 10 tablet  0     oxyCODONE (ROXICODONE) 5 MG tablet Take 1-2 tablets (5-10 mg) by mouth every 4 hours as needed for moderate to severe pain 20 tablet 0     Probiotic Product (PROBIOTIC-10 ULTIMATE) CAPS Take 1 capsule by mouth daily       alcohol swab prep pads Use to swab area of injection/jaye as directed. 100 each 3     aspirin 81 MG EC tablet TAKE 1 TABLET BY MOUTH EVERY DAY (Patient not taking: Reported on 8/29/2022)       blood glucose (NO BRAND SPECIFIED) test strip Use to test blood sugar twice daily   times daily or as directed. To accompany: Blood Glucose Monitor Brands: per insurance. 100 strip 6     blood glucose calibration (NO BRAND SPECIFIED) solution To accompany: Blood Glucose Monitor Brands: per insurance. 1 each 4     blood glucose monitoring (NO BRAND SPECIFIED) meter device kit Use to test blood sugar bid  times daily or as directed. Preferred blood glucose meter OR supplies to accompany: Blood Glucose Monitor Brands: per insurance. 1 kit 0     Continuous Blood Gluc Sensor (FREESTYLE KHADIJAH 2 SENSOR) MISC 1 Device every 14 days 6 each 3     thin (NO BRAND SPECIFIED) lancets Use with lanceting device BID. To accompany: Blood Glucose Monitor Brands: per insurance. 100 each 6     Allergies   Allergen Reactions     Adhesive Tape      Augmentin      Azathioprine      Azathioprine Other (See Comments)     pancreatitits     Certolizumab Pegol Hives     Flu Virus Vaccine      Humira Swelling     Keflex [Cephalexin Hcl]      Pneumococcal Vaccines      Sulfa Drugs Itching     rashes     Vedolizumab Fatigue, Headache, Nausea and Photosensitivity     Zithromax [Azithromycin Dihydrate]      Penicillins Rash       Recent Labs   Lab Test 08/25/22  0656 08/24/22  1050 08/04/22  0744 08/02/22  1554 08/02/22  1554 10/15/20  1126 04/23/20  0000 10/17/17  1035   A1C  --   --   --   --  8.0*  --   --   --    LDL  --   --   --   --   --   --   --  212*   HDL  --   --  31*  --   --   --   --  39*   TRIG  --   --  720*  --   --    --   --  202*   * 159* 143*  --   --  69*   < >  --    CR 0.74 0.66 0.64  --  0.73 0.79   < >  --    GFRESTIMATED >90 >90 >90  --  >90 >90   < >  --    GFRESTBLACK  --   --   --   --   --  >90  --   --    POTASSIUM 4.0 3.9 4.4   < > 4.2 3.8  --   --    TSH  --   --  58.32*  --  33.97*  --   --   --     < > = values in this interval not displayed.         Past Medical History:   Diagnosis Date     Asthma      Crohn's ileitis, unspecified complication (H)       Past Surgical History:   Procedure Laterality Date     LAPAROSCOPIC CHOLECYSTECTOMY N/A 8/25/2022    Procedure: CHOLECYSTECTOMY, LAPAROSCOPIC;  Surgeon: Zechariah Huang MD;  Location: [facility] OR     RELEASE CARPAL TUNNEL Left 2014       Review of Systems       OBJECTIVE:   /66   Pulse 76   Wt 92.7 kg (204 lb 6.4 oz)   LMP  (LMP Unknown)   BMI 36.21 kg/m    Physical Exam  GENERAL: healthy, alert and no distress  EYES: Eyes grossly normal to inspection, PERRL and conjunctivae and sclerae normal  HENT: ear canals and TM's normal, nose and mouth without ulcers or lesions  NECK: no adenopathy, no asymmetry, masses, or scars and thyroid normal to palpation  RESP: lungs clear to auscultation - no rales, rhonchi or wheezes  MS: no gross musculoskeletal defects noted, no edema  SKIN: no suspicious lesions or rashes  PSYCH: mentation appears normal, affect normal/bright    Diagnostic Test Results:  Labs reviewed in Epic      Megan Carlson MD  [facility]

## 2022-09-16 ENCOUNTER — TELEPHONE (OUTPATIENT)
Dept: SURGERY | Facility: CLINIC | Age: 37
End: 2022-09-16
Payer: COMMERCIAL

## 2022-09-16 DIAGNOSIS — Z98.890 POSTOPERATIVE STATE: Primary | ICD-10-CM

## 2022-09-16 PROCEDURE — 99024 POSTOP FOLLOW-UP VISIT: CPT | Performed by: PHYSICIAN ASSISTANT

## 2022-09-16 NOTE — TELEPHONE ENCOUNTER
[facility]   Postoperative Follow-up Phone Call  -Call to patient to review recent procedure and recovery    Procedure Date:  August/25  Surgeon:  Dr. Huang  Procedure:  Laparoscopic cholecystectomy  Pathology, reviewed with patient:  Mild cholecystitis    She is now 3 weeks postop.     Attempted to call patient for post op check.  No answer.  Message was left for patient to call back if they had any questions of concerns.  [employer] message sent.    Winsome Juares PA-C        [employer] message:   Gemma Polanco,     I left a message on your ph# to call if you have questions/concerns after your gall bladder surgery.    I also wanted to pass on to you that your pathology report confirmed: mild inflammation of your gall bladder and that there were No gallstones present.      You may now remove skin glue or tapes from incision sites.    You may slowly increase activity as tolerated.  Resume workout/strenuous activity at 1 month after surgery.    Additional findings on CT in ED, 8/24/22:     1.  Diffuse hepatic steatosis(fatty liver) and hepatomegaly, which is a possible source of abdominal pain.  2.  No evidence of cholecystitis or pancreatitis.  3.  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 No evidence of collapse or secondary degenerative changes.  4.  Stable size of 3.6 cm left adnexal(ovary) lesion, most likely a cyst but consider further evaluation with pelvic ultrasound on a non-emergent basis.    These findings can be reviewed with your Primary Care Provider and a pelvic ultrasound can be arranged thru your Primary Care Provider or OB/GYN.    Please contact me if other questions.    Thank you!  Winsome Juares PA-C

## 2022-09-27 ENCOUNTER — PRE VISIT (OUTPATIENT)
Dept: DERMATOLOGY | Facility: CLINIC | Age: 37
End: 2022-09-27

## 2022-09-27 ENCOUNTER — OFFICE VISIT (OUTPATIENT)
Dept: DERMATOLOGY | Facility: CLINIC | Age: 37
End: 2022-09-27
Payer: COMMERCIAL

## 2022-09-27 DIAGNOSIS — L73.2 HIDRADENITIS SUPPURATIVA: Primary | ICD-10-CM

## 2022-09-27 PROCEDURE — 99204 OFFICE O/P NEW MOD 45 MIN: CPT | Mod: 25 | Performed by: DERMATOLOGY

## 2022-09-27 PROCEDURE — 11900 INJECT SKIN LESIONS </W 7: CPT | Performed by: DERMATOLOGY

## 2022-09-27 RX ORDER — TRIAMCINOLONE ACETONIDE 40 MG/ML
40 INJECTION, SUSPENSION INTRA-ARTICULAR; INTRAMUSCULAR ONCE
Status: ACTIVE | OUTPATIENT
Start: 2022-09-27

## 2022-09-27 ASSESSMENT — PAIN SCALES - GENERAL: PAINLEVEL: MODERATE PAIN (4)

## 2022-09-27 NOTE — LETTER
9/27/2022       RE: Sherri Lynn  [address]  [place] [address]     Dear Colleague,    Thank you for referring your patient, Sherri Lynn, to the [facility] [place] at [facility]. Please see a copy of my visit note below.    [facility] Dermatology Note  Encounter Date: Sep 27, 2022  Office Visit     Dermatology Problem List:  # Hidradenitis suppurativa s/p bilateral axillary excision with closure with Dr. Reed 5/2022   - Topicals: tea tree oils and lavender oils, Medihoney, topical lidocaine ointment    - Also on metformin for diabetes  # Crohn's, not on treatment currently.  - Previously used Humira with injection site reactions    Soc hx: works in palliative care.  ____________________________________________    Assessment & Plan:    # Hidradenitis suppurativa - chronic, active.  Pt with recently dx HS in setting of diabetes. Also has h/o Crohn's, not currently requiring treatment. Had required multiple I&Ds and rounds of abx but doing better today after axillary excision with closure 5/2022. She has one active area today. Discussed given overall improved course, can perform ILK today for active area and continue her current topicals. If she notes recurrent flares, consider repeat prn courses of doxycycline versus suppressive doxycycline versus TNF inhibitor (she was prev on Humira for Crohn's and had site reactions so would prefer infliximab if we end up going this route). We also discussed LHR and she is interested in referral to cosmetic derm today given shaving is a trigger. She does not note hormonal flares at this time.  - ILK injected (see procedure note)  - Currently on metformin   -If persists can consider    -Doxycycline either prn or suppressive   -Remicade (did not tolerate Humira for Crohn's due to site reactions)   -Spironolactone     Procedures Performed:   - ILK PROCEDURE:  After discussing with patient the benefits and risks including, but not limited to, pain, temporary swelling, skin atrophy, skin discoloration, which may be permanent, verbal consent was obtained. 0.5cc of kenalog 40 mg/mL was used to inject into nodule sites in the L Axilla. Patient tolerated procedure well.     Follow-up: 3 months, sooner if concerns.      Staff and Scribe:     Scribe Disclosure:  I, NATALIA RIVERA, am serving as a scribe to document services personally performed by Shantell Garnica MD based on data collection and the provider's statements to me.     Provider Disclosure:   The documentation recorded by the scribe accurately reflects the services I personally performed and the decisions made by me.    Shantell Garnica MD    Department of Dermatology  [facility]: Phone: [phone], Fax: [phone]  9/29/2022     ____________________________________________    CC: Derm Problem (Sherri is here today for a HS consult )    HPI:  Ms. Sherri Lynn is a(n) 36 year old female with a past medical history of Diabetes on Metformin, hypothyroidism, recent cholecystectomy,Crohn's  who presents today as a new patient for HS. Referred to dermatology on 5/30/22 by Dr. Alberto for treatment of HS of the left axilla. The patient was daignosed recently with HS in 5/2022. It started in the left axilla with draining nodules/absesses and tracts. 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 The patient visited her PCP and the ED in March and was given a few courses of doxycyline. One of the nodules in the left axilla was drained in the urgent care and it was still persisting. Memorial day the patient went to the ED because the pain was not allowing her to sleep. Before the patient states she could manage the pain really well with topical lidocaine but this became  unmanageable. The doctor then diagnosed the patient with HS and then the patient was referred to a plastic surgeon. The patient had soft tissue removal surgery, bilateral axilla with Dr. Middleton, [facility].    - Affected areas: Bilateral Axilla, under bilateral breast  - Current systemic medications:   - Current topical medications: Mepilex on the right axilla, left nothing no open areas  - Current wound care supplies: DermEase Scar Gel   - Severity today: 3/10 before surgery 8/10    Patient is otherwise feeling well, without additional skin concerns.    Labs Reviewed:  N/A    Physical Exam:  Vitals: LMP  (LMP Unknown)   SKIN: Focused examination of bilateral axilla was performed.  - Tender nodule noted on the left axilla with non tender surgical scar noted  - Abscence of HS scarring or tunneling noted   -Right Axilla with surgical scare noted with serous drainage   - No other lesions of concern on areas examined.     Medications:  Current Outpatient Medications   Medication     albuterol (PROAIR HFA/PROVENTIL HFA/VENTOLIN HFA) 108 (90 Base) MCG/ACT inhaler     alcohol swab prep pads     atorvastatin (LIPITOR) 40 MG tablet     blood glucose (NO BRAND SPECIFIED) test strip     blood glucose calibration (NO BRAND SPECIFIED) solution     blood glucose monitoring (NO BRAND SPECIFIED) meter device kit     Continuous Blood Gluc Sensor (FREESTYLE KHADIJAH 2 SENSOR) MISC     diphenhydrAMINE HCl 12.5 MG CHEW     escitalopram (LEXAPRO) 20 MG tablet     etonogestrel (NEXPLANON) 68 MG IMPL     levothyroxine (SYNTHROID/LEVOTHROID) 112 MCG tablet     metFORMIN (GLUCOPHAGE XR) 500 MG 24 hr tablet     multivitamin w/minerals (THERA-VIT-M) tablet     Probiotic Product (PROBIOTIC-10 ULTIMATE) CAPS     thin (NO BRAND SPECIFIED) lancets     aspirin 81 MG EC tablet     ondansetron (ZOFRAN ODT) 4 MG ODT tab     Current Facility-Administered Medications   Medication     triamcinolone (KENALOG-40) injection 40 mg       Past Medical History:   Patient Active Problem List   Diagnosis     CARDIOVASCULAR SCREENING; LDL GOAL LESS THAN 160     Crohn's disease (H)     Carpal tunnel syndrome     Intermittent asthma     Administrative encounter     Lactose intolerance in adult     Inflammatory arthritis     Current severe episode of major depressive disorder without psychotic features without prior episode (H)     Hidradenitis suppurativa     Morbid obesity (H)     History of carpal tunnel release     Diabetes mellitus, type 2 (H)     Diabetes mellitus treated with oral medication (H)     Biliary colic     Bony sclerosis     Hepatic steatosis     S/P cholecystectomy     Acquired hypothyroidism     Past Medical History:   Diagnosis Date     Asthma      Crohn's ileitis, unspecified complication (H)         CC Megan Carlson MD  [address] [place] [address] on close of this encounter.

## 2022-09-27 NOTE — PROGRESS NOTES
[facility] Dermatology Note  Encounter Date: Sep 27, 2022  Office Visit     Dermatology Problem List:  # Hidradenitis suppurativa s/p bilateral axillary excision with closure with Dr. Reed 5/2022   - Topicals: tea tree oils and lavender oils, Medihoney, topical lidocaine ointment    - Also on metformin for diabetes  # Crohn's, not on treatment currently.  - Previously used Humira with injection site reactions    Soc hx: works in palliative care.  ____________________________________________    Assessment & Plan:    # Hidradenitis suppurativa - chronic, active.  Pt with recently dx HS in setting of diabetes. Also has h/o Crohn's, not currently requiring treatment. Had required multiple I&Ds and rounds of abx but doing better today after axillary excision with closure 5/2022. She has one active area today. Discussed given overall improved course, can perform ILK today for active area and continue her current topicals. If she notes recurrent flares, consider repeat prn courses of doxycycline versus suppressive doxycycline versus TNF inhibitor (she was prev on Humira for Crohn's and had site reactions so would prefer infliximab if we end up going this route). We also discussed LHR and she is interested in referral to cosmetic derm today given shaving is a trigger. She does not note hormonal flares at this time.  - ILK injected (see procedure note)  - Currently on metformin   -If persists can consider    -Doxycycline either prn or suppressive   -Remicade (did not tolerate Humira for Crohn's due to site reactions)   -Spironolactone     Procedures Performed:   - ILK PROCEDURE: After discussing with patient the benefits and risks including, but not limited to, pain, temporary swelling, skin atrophy, skin discoloration, which may be permanent, verbal consent was obtained. 0.5cc of kenalog 40 mg/mL was used to inject into nodule sites in the L Axilla. Patient tolerated procedure well.     Follow-up: 3  months, sooner if concerns.      Staff and Scribe:     Scribe Disclosure:  I, NATALIA RIVERA, am serving as a scribe to document services personally performed by Shantell Garnica MD based on data collection and the provider's statements to me.     Provider Disclosure:   The documentation recorded by the scribe accurately reflects the services I personally performed and the decisions made by me.    Shantell Garnica MD    Department of Dermatology  [facility]: Phone: [phone], Fax: [phone]  9/29/2022     ____________________________________________    CC: Derm Problem (Sherri is here today for a HS consult )    HPI:  Ms. Sherri Lynn is a(n) 36 year old female with a past medical history of Diabetes on Metformin, hypothyroidism, recent cholecystectomy,Crohn's  who presents today as a new patient for HS. Referred to dermatology on 5/30/22 by Dr. Alberto for treatment of HS of the left axilla. The patient was daignosed recently with HS in 5/2022. It started in the left axilla with draining nodules/absesses and tracts. 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 The patient visited her PCP and the ED in March and was given a few courses of doxycyline. One of the nodules in the left axilla was drained in the urgent care and it was still persisting. Memorial day the patient went to the ED because the pain was not allowing her to sleep. Before the patient states she could manage the pain really well with topical lidocaine but this became unmanageable. The doctor then diagnosed the patient with HS and then the patient was referred to a plastic surgeon. The patient had soft tissue removal surgery, bilateral axilla with Dr. Middleton, [facility] [place].    - Affected areas: Bilateral Axilla, under bilateral breast  - Current systemic medications:   -  Current topical medications: Mepilex on the right axilla, left nothing no open areas  - Current wound care supplies: DermEase Scar Gel   - Severity today: 3/10 before surgery 8/10    Patient is otherwise feeling well, without additional skin concerns.    Labs Reviewed:  N/A    Physical Exam:  Vitals: LMP  (LMP Unknown)   SKIN: Focused examination of bilateral axilla was performed.  - Tender nodule noted on the left axilla with non tender surgical scar noted  - Abscence of HS scarring or tunneling noted   -Right Axilla with surgical scare noted with serous drainage   - No other lesions of concern on areas examined.     Medications:  Current Outpatient Medications   Medication     albuterol (PROAIR HFA/PROVENTIL HFA/VENTOLIN HFA) 108 (90 Base) MCG/ACT inhaler     alcohol swab prep pads     atorvastatin (LIPITOR) 40 MG tablet     blood glucose (NO BRAND SPECIFIED) test strip     blood glucose calibration (NO BRAND SPECIFIED) solution     blood glucose monitoring (NO BRAND SPECIFIED) meter device kit     Continuous Blood Gluc Sensor (FREESTYLE KHADIJAH 2 SENSOR) MISC     diphenhydrAMINE HCl 12.5 MG CHEW     escitalopram (LEXAPRO) 20 MG tablet     etonogestrel (NEXPLANON) 68 MG IMPL     levothyroxine (SYNTHROID/LEVOTHROID) 112 MCG tablet     metFORMIN (GLUCOPHAGE XR) 500 MG 24 hr tablet     multivitamin w/minerals (THERA-VIT-M) tablet     Probiotic Product (PROBIOTIC-10 ULTIMATE) CAPS     thin (NO BRAND SPECIFIED) lancets     aspirin 81 MG EC tablet     ondansetron (ZOFRAN ODT) 4 MG ODT tab     Current Facility-Administered Medications   Medication     triamcinolone (KENALOG-40) injection 40 mg      Past Medical History:   Patient Active Problem List   Diagnosis     CARDIOVASCULAR SCREENING; LDL GOAL LESS THAN 160     Crohn's disease (H)     Carpal tunnel syndrome     Intermittent asthma     Administrative encounter     Lactose intolerance in adult     Inflammatory arthritis     Current severe episode of major depressive  disorder without psychotic features without prior episode (H)     Hidradenitis suppurativa     Morbid obesity (H)     History of carpal tunnel release     Diabetes mellitus, type 2 (H)     Diabetes mellitus treated with oral medication (H)     Biliary colic     Bony sclerosis     Hepatic steatosis     S/P cholecystectomy     Acquired hypothyroidism     Past Medical History:   Diagnosis Date     Asthma      Crohn's ileitis, unspecified complication (H)         CC Megan Carlson MD  [id number] [place] [address] on close of this encounter.

## 2022-09-27 NOTE — PATIENT INSTRUCTIONS
HIDRADENITIS SUPPURATIVA     What is hidradenitis suppurativa (HS)?  Hidradenitis suppurativa (HS) is a chronic skin disorder affecting the hair follicles. The disease starts with sore red lumps (or boils), typically involving the armpits, breasts, lower abdomen, groin, and buttocks. These lesions appear suddenly, increase in size and then burst or rupture, usually under the surface of the skin. This causes severe pain. Sometimes they rupture to the surface of the skin, draining pus. In some people, they can result in tunnels under the skin that may or may not continue to drain. When the lumps heal, they can leave scars.     Facts:   HS is a chronic skin disease, that comes and goes in flares, and is very painful  HS happens in many people - men and women, young and elderly, all races and ethnicities. But HS is more common in young adults, women  and skin of color  One out of three people with HS has a family member with HS   HS is NOT due to an infection or washing habits  HS is NOT contagious, so it cannot be spread from one person to another person   HS is NOT caused by how well you wash or what soaps you use  HS is NOT caused by smoking or obesity, but quitting smoking and losing weight have helped some people        Causes  The cause of HS remains unclear. It is thought that there is a problem in the hair follicle that makes it weaker and easier to break open. The current theory is that there are three steps that cause an HS lesion to form::   The hair follicle gets plugged   The hair follicle swells and then ruptures, causing pain and inflammation   In some people, the inflammation does not stop and results in tunneling through the skin       Associated Conditions  HS is associated with several other medical conditions and activities including:  Tobacco use  High cholesterol, heart disease and stroke   Type 2 diabetes   Depression and anxiety   Arthritis, which causes stiffness and pain in joints    Inflammatory bowel disease (including Crohn's disease and ulcerative colitis)  Severe acne and pilonidal sinus/cyst  Polycystic ovarian syndrome  Skin cancer in areas of longstanding HS lesions, typically in the groin or buttocks (rare)    It is important to ask your physician to watch out for these conditions.      To help manage mental health issues related to HS and emotional support:  Help finding a mental health specialist: https://www.psychologyContextPlane.MoveinBlue/us/therapists  Suicide prevention (24/7 free confidential support):   Phone: (504) 429-1949  Website: https://suicidepreventionlifeline.org/    Support groups:  https://hopeforhs.org/  Intimacy support: American Association of Sexuality Educators, Counselors and Therapists (AASECT) website: https://www.aasect.org    For help quitting smoking   Phone:  English: 2-173-ZKSY-NOW (1-714.122.2368)  Upper sor[place]: 2-040-TMEAVT-YA (1-909.581.7786)    Website:   English: https://smokefree.gov/  Upper sor[place]: beau.smokefree.gov     Lifestyle Modifications   Quitting smoking or weight loss can help your overall health and may help improve HS symptoms in some people, but not everyone.   It is recommended to eat a well-balanced, healthy diet (https://health.gov/dietaryguidelines) and to maintain a healthy weight. Avoiding dietary triggers can help some people suffering with HS, but will not necessarily help others with HS.    Some people may find that friction, irritation, and rubbing may worsen HS symptoms. Some people do better with loose, breathable clothing and fabrics. Shaving close to the affected areas may also worsen HS in some people. But, not everyone has the same triggers for their HS, so see what works for you!    Some medications may worsen HS such as lithium, testosterone, and some progesterone-only birth control.  Please discuss this with your provider before stopping medication.     Zinc supplementation has been shown in some studies to help HS. However, every  treatment can have a side effects,  so talk to your provider before starting any treatment.     Treatment                    Medicines, procedures and surgeries are offered to treat HS. Treatment can help reduce breakouts and improve quality of life.  Medicines used:  Topical washes (e.g. chlorhexidine, benzoyl peroxide)    Clindamycin on the skin - seems effective for pustules and papules. Probably not helpful for larger chronic lesions.     Oral antibiotics (e.g. doxycycline, clindamycin + rifampin, dapsone) - antibiotics may help some, but not all patients    Hormonal therapies (e.g. spironolactone, oral contraceptives) - primarily used in females though to have a hormonal component to their HS (e.g perimenstrual flares, worsening in pregnancy, polycystic ovarian syndrome)    Immunosuppression (e.g. prednisone)     Injectables (e.g. adalimumab, infliximab) - Adalimumab is the only federal drug administration (FDA) approved medication for HS    Procedures:  Intralesional steroid injections - may help areas of acute active inflammation     Some hair removal lasers - If considering this option, we recommend doing this only with a medical professional that has experience treating HS.     Surgeries:  Incision and drainage - Provides fast, short-term relief, but symptoms likely to recur.        Deroofing - This surgery involves opening the lesion and cleaning out the base. This treatment is effective for recurring lesions.  Recurrence rates seem to be similar to excision. These are typically left open and allowed to heal on their own over 1-3 months      Excision - This involves cutting out chronic lesions.  This may entail cutting out a large affected area referred to as wide local excision, or cutting out single lesions, referred to as localized excisions.  Excision may be done with a scalpel, electric heating device or laser (e.g. carbon dioxide [CO2] laser).  These are either allowed to heal on their own, closed  with sutures, or closed with a graft or flap.  Grafts are typically done by taking skin from one site of the body and using it to close another part of the body.  Flaps are done by moving tissue around to close a wound.     Check out this website to help decide the best treatment options for you!     https://www.informed-decisions.org/hidradenitispda.php

## 2022-09-27 NOTE — NURSING NOTE
Dermatology Rooming Note    Sherri Lynn's goals for this visit include:   Chief Complaint   Patient presents with     Derm Problem     Sherri is here today for a HS consult      MURIEL Mckeon

## 2022-10-04 ENCOUNTER — OFFICE VISIT (OUTPATIENT)
Dept: ORTHOPEDICS | Facility: CLINIC | Age: 37
End: 2022-10-04
Attending: FAMILY MEDICINE
Payer: COMMERCIAL

## 2022-10-04 VITALS — HEIGHT: 63 IN | BODY MASS INDEX: 33.66 KG/M2 | WEIGHT: 190 LBS

## 2022-10-04 DIAGNOSIS — M87.051 AVASCULAR NECROSIS OF BONES OF BOTH HIPS (H): ICD-10-CM

## 2022-10-04 DIAGNOSIS — M87.052 AVASCULAR NECROSIS OF BONES OF BOTH HIPS (H): ICD-10-CM

## 2022-10-04 PROCEDURE — 99203 OFFICE O/P NEW LOW 30 MIN: CPT | Performed by: ORTHOPAEDIC SURGERY

## 2022-10-04 NOTE — PATIENT INSTRUCTIONS
We addressed the following today:    1. Avascular necrosis of bones of both hips (H)        MRI Bilateral Hips   Advanced imaging is done by appointment. Please call [place] [place]: [phone] and [facility] [place]: [phone] to schedule     Depending on your availability you can usually schedule within the next 1-2 days.    Some insurance companies may require a prior authorization to be completed which can delay the time until you are able to schedule your appointment.       If you are active on [employer], you may have access to your test results before your provider is able to review the study and advise on next steps.        Follow up in 2-3 weeks after MRIs have been completed. Call clinic triage number [[phone]] at any time with questions or concerns.

## 2022-10-04 NOTE — PROGRESS NOTES
Patient is a 36 year old, right hand dominant female seen today in consultation for bilateral hip avascular necrosis, right is more painful that then left.  They report onset of symptoms years. without injury.   Pain is located into the groin and into the buttock on right side, left side is buttock pain only, and described as dull and achiness.  Increased pain with increased activities in one day.  Pain sometimes, due to the right side,  wakes at nighttime. Pain is improved by adjusting pillows at night, uses topical with some relief and epsom salt baths.     Current pain level: 4/10, Worst pain level: 6-7/10.     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     S: Some R groin pain especially when seated with hip flexed 90 degrees.  Hx crohn's disease/steroid use and now thyroid and diabetes issues.         Patient Active Problem List   Diagnosis     CARDIOVASCULAR SCREENING; LDL GOAL LESS THAN 160     Crohn's disease (H)     Carpal tunnel syndrome     Intermittent asthma     Administrative encounter     Lactose intolerance in adult     Inflammatory arthritis     Current severe episode of major depressive disorder without psychotic features without prior episode (H)     Hidradenitis suppurativa     Morbid obesity (H)     History of carpal tunnel release     Diabetes mellitus, type 2 (H)     Diabetes mellitus treated with oral medication (H)     Biliary colic     Bony sclerosis     Hepatic steatosis     S/P cholecystectomy     Acquired hypothyroidism            Past Medical History:   Diagnosis Date     Asthma      Crohn's ileitis, unspecified complication (H)             Past Surgical History:   Procedure Laterality Date     LAPAROSCOPIC CHOLECYSTECTOMY N/A 8/25/2022    Procedure: CHOLECYSTECTOMY, LAPAROSCOPIC;  Surgeon: Zechariah Huang MD;  Location: [facility] OR     RELEASE CARPAL TUNNEL Left 2014            Social History     Tobacco Use     Smoking status: Never Smoker      Smokeless tobacco: Never Used   Substance Use Topics     Alcohol use: Yes            Family History   Problem Relation Age of Onset     Asthma Mother      Hypertension Mother      Asthma Sister      Asthma Brother                Allergies   Allergen Reactions     Adhesive Tape      Augmentin      Azathioprine      Azathioprine Other (See Comments)     pancreatitits     Certolizumab Pegol Hives     Flu Virus Vaccine      Humira Swelling     Keflex [Cephalexin Hcl]      Pneumococcal Vaccines      Sulfa Drugs Itching     rashes     Vedolizumab Fatigue, Headache, Nausea and Photosensitivity     Zithromax [Azithromycin Dihydrate]      Penicillins Rash            Current Outpatient Medications   Medication Sig Dispense Refill     albuterol (PROAIR HFA/PROVENTIL HFA/VENTOLIN HFA) 108 (90 Base) MCG/ACT inhaler Inhale 2 puffs into the lungs every 6 hours 18 g 3     alcohol swab prep pads Use to swab area of injection/jaye as directed. 100 each 3     aspirin 81 MG EC tablet TAKE 1 TABLET BY MOUTH EVERY DAY (Patient not taking: No sig reported)       atorvastatin (LIPITOR) 40 MG tablet Take 1 tablet (40 mg) by mouth daily 90 tablet 4     blood glucose (NO BRAND SPECIFIED) test strip Use to test blood sugar twice daily   times daily or as directed. To accompany: Blood Glucose Monitor Brands: per insurance. 100 strip 6     blood glucose calibration (NO BRAND SPECIFIED) solution To accompany: Blood Glucose Monitor Brands: per insurance. 1 each 4     blood glucose monitoring (NO BRAND SPECIFIED) meter device kit Use to test blood sugar bid  times daily or as directed. Preferred blood glucose meter OR supplies to accompany: Blood Glucose Monitor Brands: per insurance. 1 kit 0     Continuous Blood Gluc Sensor (FREESTYLE KHADIJAH 2 SENSOR) MISC 1 Device every 14 days 6 each 3     diphenhydrAMINE HCl 12.5 MG CHEW Take 12.5 mg by mouth daily as needed       escitalopram (LEXAPRO) 20 MG tablet Take 1 tablet (20 mg) by mouth daily 90  tablet 3     etonogestrel (NEXPLANON) 68 MG IMPL 1 each       levothyroxine (SYNTHROID/LEVOTHROID) 112 MCG tablet Take 1 tablet (112 mcg) by mouth daily 90 tablet 1     metFORMIN (GLUCOPHAGE XR) 500 MG 24 hr tablet Take 4 tablets (2,000 mg) by mouth daily (with dinner) for 90 days (Patient taking differently: Take 1,000 mg by mouth 2 times daily (with meals)) 360 tablet 4     multivitamin w/minerals (THERA-VIT-M) tablet Take 1 tablet by mouth daily       ondansetron (ZOFRAN ODT) 4 MG ODT tab Take 1 tablet (4 mg) by mouth every 8 hours as needed for nausea 10 tablet 0     Probiotic Product (PROBIOTIC-10 ULTIMATE) CAPS Take 1 capsule by mouth daily       thin (NO BRAND SPECIFIED) lancets Use with lanceting device BID. To accompany: Blood Glucose Monitor Brands: per insurance. 100 each 6          Review Of Systems  Skin: negative  Eyes: negative  Ears/Nose/Throat: negative  Respiratory: No shortness of breath, dyspnea on exertion, cough, or hemoptysis    O: Physical Exam:  Supple hips to rotation.  No crepitus/ no guarding.  CMS intact to both lower extremities.  Not tender over trochanteric bursas.  NO tension signs.    Labs:  Sees Rheumatology    Images:                                                                   IMPRESSION: Small areas of subchondral sclerosis in the femoral heads  bilaterally consistent with chronic avascular necrosis are again  identified. The overlying articular surface is intact. No joint space  narrowing. No fractures are evident.     IMPRESSION:   1.  Diffuse hepatic steatosis and hepatomegaly, which is a possible  source of abdominal pain.  2.  No evidence of cholecystitis or pancreatitis.  3.  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 No evidence of  collapse or secondary degenerative changes.  4.  Stable size of 3.6 cm left adnexal lesion, most likely a cyst but  consider further evaluation with pelvic ultrasound on a nonemergent  basis.    A: AVN each hip    P:  MRI scan  pelvis/each hip to stage lesions.  R side is symptomatic and even wakes her up at night, left side is asymptomatic.  See back after study.  Discussed [facility] and the idea of anticoagulation to stop progression of AVN.  Will continue Baby ASA daily.  Discussed Core Decompression.           In addition to the above assessment and plan each active problem on Sherri's problem list was evaluated today. This included the questioning of Sherri for any medication problems. We will continue the current treatment plan for these active problems except as noted.

## 2022-10-04 NOTE — LETTER
10/4/2022         RE: Sherri Lynn  [address]  [place] [address]        Dear Colleague,    Thank you for referring your patient, Sherri Lynn, to the [facility] [place]. Please see a copy of my visit note below.    Patient is a 36 year old, right hand dominant female seen today in consultation for bilateral hip avascular necrosis, right is more painful that then left.  They report onset of symptoms years. without injury.   Pain is located into the groin and into the buttock on right side, left side is buttock pain only, and described as dull and achiness.  Increased pain with increased activities in one day.  Pain sometimes, due to the right side,  wakes at nighttime. Pain is improved by adjusting pillows at night, uses topical with some relief and epsom salt baths.     Current pain level: 4/10, Worst pain level: 6-7/10.     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     S: Some R groin pain especially when seated with hip flexed 90 degrees.  Hx crohn's disease/steroid use and now thyroid and diabetes issues.         Patient Active Problem List   Diagnosis     CARDIOVASCULAR SCREENING; LDL GOAL LESS THAN 160     Crohn's disease (H)     Carpal tunnel syndrome     Intermittent asthma     Administrative encounter     Lactose intolerance in adult     Inflammatory arthritis     Current severe episode of major depressive disorder without psychotic features without prior episode (H)     Hidradenitis suppurativa     Morbid obesity (H)     History of carpal tunnel release     Diabetes mellitus, type 2 (H)     Diabetes mellitus treated with oral medication (H)     Biliary colic     Bony sclerosis     Hepatic steatosis     S/P cholecystectomy     Acquired hypothyroidism            Past Medical History:   Diagnosis Date     Asthma      Crohn's ileitis, unspecified complication (H)             Past Surgical History:   Procedure  Laterality Date     LAPAROSCOPIC CHOLECYSTECTOMY N/A 8/25/2022    Procedure: CHOLECYSTECTOMY, LAPAROSCOPIC;  Surgeon: Zechariah Huang MD;  Location: [facility] OR     RELEASE CARPAL TUNNEL Left 2014            Social History     Tobacco Use     Smoking status: Never Smoker     Smokeless tobacco: Never Used   Substance Use Topics     Alcohol use: Yes            Family History   Problem Relation Age of Onset     Asthma Mother      Hypertension Mother      Asthma Sister      Asthma Brother                Allergies   Allergen Reactions     Adhesive Tape      Augmentin      Azathioprine      Azathioprine Other (See Comments)     pancreatitits     Certolizumab Pegol Hives     Flu Virus Vaccine      Humira Swelling     Keflex [Cephalexin Hcl]      Pneumococcal Vaccines      Sulfa Drugs Itching     rashes     Vedolizumab Fatigue, Headache, Nausea and Photosensitivity     Zithromax [Azithromycin Dihydrate]      Penicillins Rash            Current Outpatient Medications   Medication Sig Dispense Refill     albuterol (PROAIR HFA/PROVENTIL HFA/VENTOLIN HFA) 108 (90 Base) MCG/ACT inhaler Inhale 2 puffs into the lungs every 6 hours 18 g 3     alcohol swab prep pads Use to swab area of injection/jaye as directed. 100 each 3     aspirin 81 MG EC tablet TAKE 1 TABLET BY MOUTH EVERY DAY (Patient not taking: No sig reported)       atorvastatin (LIPITOR) 40 MG tablet Take 1 tablet (40 mg) by mouth daily 90 tablet 4     blood glucose (NO BRAND SPECIFIED) test strip Use to test blood sugar twice daily   times daily or as directed. To accompany: Blood Glucose Monitor Brands: per insurance. 100 strip 6     blood glucose calibration (NO BRAND SPECIFIED) solution To accompany: Blood Glucose Monitor Brands: per insurance. 1 each 4     blood glucose monitoring (NO BRAND SPECIFIED) meter device kit Use to test blood sugar bid  times daily or as directed. Preferred blood glucose meter OR supplies to accompany: Blood Glucose Monitor Brands: per  insurance. 1 kit 0     Continuous Blood Gluc Sensor (FREESTYLE KHADIJAH 2 SENSOR) MISC 1 Device every 14 days 6 each 3     diphenhydrAMINE HCl 12.5 MG CHEW Take 12.5 mg by mouth daily as needed       escitalopram (LEXAPRO) 20 MG tablet Take 1 tablet (20 mg) by mouth daily 90 tablet 3     etonogestrel (NEXPLANON) 68 MG IMPL 1 each       levothyroxine (SYNTHROID/LEVOTHROID) 112 MCG tablet Take 1 tablet (112 mcg) by mouth daily 90 tablet 1     metFORMIN (GLUCOPHAGE XR) 500 MG 24 hr tablet Take 4 tablets (2,000 mg) by mouth daily (with dinner) for 90 days (Patient taking differently: Take 1,000 mg by mouth 2 times daily (with meals)) 360 tablet 4     multivitamin w/minerals (THERA-VIT-M) tablet Take 1 tablet by mouth daily       ondansetron (ZOFRAN ODT) 4 MG ODT tab Take 1 tablet (4 mg) by mouth every 8 hours as needed for nausea 10 tablet 0     Probiotic Product (PROBIOTIC-10 ULTIMATE) CAPS Take 1 capsule by mouth daily       thin (NO BRAND SPECIFIED) lancets Use with lanceting device BID. To accompany: Blood Glucose Monitor Brands: per insurance. 100 each 6          Review Of Systems  Skin: negative  Eyes: negative  Ears/Nose/Throat: negative  Respiratory: No shortness of breath, dyspnea on exertion, cough, or hemoptysis    O: Physical Exam:  Supple hips to rotation.  No crepitus/ no guarding.  CMS intact to both lower extremities.  Not tender over trochanteric bursas.  NO tension signs.    Labs:  Sees Rheumatology    Images:                                                                   IMPRESSION: Small areas of subchondral sclerosis in the femoral heads  bilaterally consistent with chronic avascular necrosis are again  identified. The overlying articular surface is intact. No joint space  narrowing. No fractures are evident.     IMPRESSION:   1.  Diffuse hepatic steatosis and hepatomegaly, which is a possible  source of abdominal pain.  2.  No evidence of cholecystitis or pancreatitis.  3.  Likely early avascular  necrosis in both hips. No evidence of  collapse or secondary degenerative changes.  4.  Stable size of 3.6 cm left adnexal lesion, most likely a cyst but  consider further evaluation with pelvic ultrasound on a nonemergent  basis.    A: AVN each hip    P:  MRI scan pelvis/each hip to stage lesions.  R side is symptomatic and even wakes her up at night, left side is asymptomatic.  See back after study.  Discussed [facility] and the idea of anticoagulation to stop progression of AVN.  Will continue Baby ASA daily.  Discussed Core Decompression.           In addition to the above assessment and plan each active problem on Sherri's problem list was evaluated today. This included the questioning of Sherri for any medication problems. We will continue the current treatment plan for these active problems except as noted.          Again, thank you for allowing me to participate in the care of your patient.        Sincerely,        DEV LAST MD

## 2022-10-13 ENCOUNTER — HOSPITAL ENCOUNTER (OUTPATIENT)
Dept: MRI IMAGING | Facility: CLINIC | Age: 37
Discharge: HOME | End: 2022-10-13
Attending: ORTHOPAEDIC SURGERY | Admitting: ORTHOPAEDIC SURGERY
Payer: COMMERCIAL

## 2022-10-13 DIAGNOSIS — M87.051 AVASCULAR NECROSIS OF BONES OF BOTH HIPS (H): ICD-10-CM

## 2022-10-13 DIAGNOSIS — M87.052 AVASCULAR NECROSIS OF BONES OF BOTH HIPS (H): ICD-10-CM

## 2022-10-13 PROCEDURE — 73721 MRI JNT OF LWR EXTRE W/O DYE: CPT | Mod: 50

## 2022-10-13 PROCEDURE — 73721 MRI JNT OF LWR EXTRE W/O DYE: CPT | Mod: 26 | Performed by: RADIOLOGY

## 2022-10-18 ENCOUNTER — OFFICE VISIT (OUTPATIENT)
Dept: ORTHOPEDICS | Facility: CLINIC | Age: 37
End: 2022-10-18
Payer: COMMERCIAL

## 2022-10-18 ENCOUNTER — LAB (OUTPATIENT)
Dept: LAB | Facility: CLINIC | Age: 37
End: 2022-10-18
Payer: COMMERCIAL

## 2022-10-18 VITALS
SYSTOLIC BLOOD PRESSURE: 110 MMHG | DIASTOLIC BLOOD PRESSURE: 82 MMHG | HEIGHT: 63 IN | BODY MASS INDEX: 33.66 KG/M2 | WEIGHT: 190 LBS

## 2022-10-18 DIAGNOSIS — E11.9 DIABETES MELLITUS TREATED WITH ORAL MEDICATION (H): ICD-10-CM

## 2022-10-18 DIAGNOSIS — Z79.84 DIABETES MELLITUS TREATED WITH ORAL MEDICATION (H): ICD-10-CM

## 2022-10-18 DIAGNOSIS — M87.00 AVASCULAR NECROSIS (H): ICD-10-CM

## 2022-10-18 DIAGNOSIS — E03.9 ACQUIRED HYPOTHYROIDISM: Primary | ICD-10-CM

## 2022-10-18 DIAGNOSIS — M19.90 INFLAMMATORY ARTHRITIS: Primary | ICD-10-CM

## 2022-10-18 DIAGNOSIS — K76.0 HEPATIC STEATOSIS: ICD-10-CM

## 2022-10-18 LAB
ALBUMIN SERPL-MCNC: 3.8 G/DL (ref 3.4–5)
ALP SERPL-CCNC: 87 U/L (ref 40–150)
ALT SERPL W P-5'-P-CCNC: 77 U/L (ref 0–50)
AST SERPL W P-5'-P-CCNC: 45 U/L (ref 0–45)
BILIRUB DIRECT SERPL-MCNC: <0.1 MG/DL (ref 0–0.2)
BILIRUB SERPL-MCNC: 0.3 MG/DL (ref 0.2–1.3)
CREAT UR-MCNC: 158 MG/DL
HBA1C MFR BLD: 7.4 % (ref 0–5.6)
MICROALBUMIN UR-MCNC: 49 MG/L
MICROALBUMIN/CREAT UR: 31.01 MG/G CR (ref 0–25)
PROT SERPL-MCNC: 7.9 G/DL (ref 6.8–8.8)
T4 FREE SERPL-MCNC: 1.11 NG/DL (ref 0.76–1.46)
TSH SERPL DL<=0.005 MIU/L-ACNC: 5.09 MU/L (ref 0.4–4)

## 2022-10-18 PROCEDURE — 84439 ASSAY OF FREE THYROXINE: CPT

## 2022-10-18 PROCEDURE — 99213 OFFICE O/P EST LOW 20 MIN: CPT | Performed by: ORTHOPAEDIC SURGERY

## 2022-10-18 PROCEDURE — 84443 ASSAY THYROID STIM HORMONE: CPT

## 2022-10-18 PROCEDURE — 83036 HEMOGLOBIN GLYCOSYLATED A1C: CPT

## 2022-10-18 PROCEDURE — 80076 HEPATIC FUNCTION PANEL: CPT

## 2022-10-18 PROCEDURE — 36415 COLL VENOUS BLD VENIPUNCTURE: CPT

## 2022-10-18 PROCEDURE — 82043 UR ALBUMIN QUANTITATIVE: CPT

## 2022-10-18 RX ORDER — DICLOFENAC SODIUM AND MISOPROSTOL 75; 200 MG/1; UG/1
1 TABLET, DELAYED RELEASE ORAL 2 TIMES DAILY
Qty: 30 TABLET | Refills: 0 | Status: SHIPPED | OUTPATIENT
Start: 2022-10-18

## 2022-10-18 NOTE — PATIENT INSTRUCTIONS
1. Inflammatory arthritis       Schedule  surgery.   [facility] will contact you to assist with scheduling surgery.   You can contact her directly at [phone].   Prior to your scheduled surgery we advise scheduling with your dentist to obtain clearance for surgery, and to complete any recommended dental work prior.     FORMS:   If you are needing any forms completed relating to your upcoming procedure, please send them to our office with a completed Release of Information.   Forms will be completed AFTER your procedure. A letter can be sent to your employer prior to surgery to inform them of your anticipated time off.    Please notify our staff if you would like a letter to do so.       DO NOT BRING FORMS ON THE DATE OF SURGERY.     MEDICATION REFILL:   Please allow 3 business days for completion of medication refills for any surgery related prescription.   Medication refills submitted on Friday, may not be addressed until the following Monday.       Call my office with any questions or concerns, [phone].

## 2022-10-18 NOTE — PROGRESS NOTES
S: Patient is a 36 year old seen today in follow up for bilateral hip pain.    They were previously evaluated on 10/4/22. Since this visit they report pain in right hip is worse. They have tried the following therapies: ibuprofen, Tumeric, fish oil, Voltaren gel, lidocaine cream, essential oil, heat, changing positions.    Current pain level: 6/10         Patient Active Problem List   Diagnosis     CARDIOVASCULAR SCREENING; LDL GOAL LESS THAN 160     Crohn's disease (H)     Carpal tunnel syndrome     Intermittent asthma     Administrative encounter     Lactose intolerance in adult     Inflammatory arthritis     Current severe episode of major depressive disorder without psychotic features without prior episode (H)     Hidradenitis suppurativa     Morbid obesity (H)     History of carpal tunnel release     Diabetes mellitus, type 2 (H)     Diabetes mellitus treated with oral medication (H)     Biliary colic     Bony sclerosis     Hepatic steatosis     S/P cholecystectomy     Acquired hypothyroidism            Past Medical History:   Diagnosis Date     Asthma      Crohn's ileitis, unspecified complication (H)             Past Surgical History:   Procedure Laterality Date     LAPAROSCOPIC CHOLECYSTECTOMY N/A 8/25/2022    Procedure: CHOLECYSTECTOMY, LAPAROSCOPIC;  Surgeon: Zechariah Huang MD;  Location: [facility] OR     RELEASE CARPAL TUNNEL Left 2014            Social History     Tobacco Use     Smoking status: Never     Smokeless tobacco: Never   Substance Use Topics     Alcohol use: Yes            Family History   Problem Relation Age of Onset     Asthma Mother      Hypertension Mother      Asthma Sister      Asthma Brother                Allergies   Allergen Reactions     Adhesive Tape      Augmentin      Azathioprine      Azathioprine Other (See Comments)     pancreatitits     Certolizumab Pegol Hives     Flu Virus Vaccine      Humira Swelling     Keflex [Cephalexin Hcl]      Pneumococcal Vaccines      Sulfa Drugs  Itching     rashes     Vedolizumab Fatigue, Headache, Nausea and Photosensitivity     Zithromax [Azithromycin Dihydrate]      Penicillins Rash            Current Outpatient Medications   Medication Sig Dispense Refill     albuterol (PROAIR HFA/PROVENTIL HFA/VENTOLIN HFA) 108 (90 Base) MCG/ACT inhaler Inhale 2 puffs into the lungs every 6 hours 18 g 3     alcohol swab prep pads Use to swab area of injection/jaye as directed. 100 each 3     aspirin 81 MG EC tablet TAKE 1 TABLET BY MOUTH EVERY DAY (Patient not taking: No sig reported)       atorvastatin (LIPITOR) 40 MG tablet Take 1 tablet (40 mg) by mouth daily 90 tablet 4     blood glucose (NO BRAND SPECIFIED) test strip Use to test blood sugar twice daily   times daily or as directed. To accompany: Blood Glucose Monitor Brands: per insurance. 100 strip 6     blood glucose calibration (NO BRAND SPECIFIED) solution To accompany: Blood Glucose Monitor Brands: per insurance. 1 each 4     blood glucose monitoring (NO BRAND SPECIFIED) meter device kit Use to test blood sugar bid  times daily or as directed. Preferred blood glucose meter OR supplies to accompany: Blood Glucose Monitor Brands: per insurance. 1 kit 0     Continuous Blood Gluc Sensor (FREESTYLE KHADIJAH 2 SENSOR) MISC 1 Device every 14 days 6 each 3     diphenhydrAMINE HCl 12.5 MG CHEW Take 12.5 mg by mouth daily as needed       escitalopram (LEXAPRO) 20 MG tablet Take 1 tablet (20 mg) by mouth daily 90 tablet 3     etonogestrel (NEXPLANON) 68 MG IMPL 1 each       levothyroxine (SYNTHROID/LEVOTHROID) 112 MCG tablet Take 1 tablet (112 mcg) by mouth daily 90 tablet 1     metFORMIN (GLUCOPHAGE XR) 500 MG 24 hr tablet Take 4 tablets (2,000 mg) by mouth daily (with dinner) for 90 days (Patient taking differently: Take 1,000 mg by mouth 2 times daily (with meals)) 360 tablet 4     multivitamin w/minerals (THERA-VIT-M) tablet Take 1 tablet by mouth daily       ondansetron (ZOFRAN ODT) 4 MG ODT tab Take 1 tablet (4 mg)  by mouth every 8 hours as needed for nausea 10 tablet 0     Probiotic Product (PROBIOTIC-10 ULTIMATE) CAPS Take 1 capsule by mouth daily       thin (NO BRAND SPECIFIED) lancets Use with lanceting device BID. To accompany: Blood Glucose Monitor Brands: per insurance. 100 each 6          Review Of Systems  Skin: negative  Eyes: negative  Ears/Nose/Throat: negative  Respiratory: No shortness of breath, dyspnea on exertion, cough, or hemoptysis    O:Physical Exam:  Adequate rotation hips.  ROM symmetric.  CMS intact to both lower extremities.    Lab:  TSH elevated, HgbA1C elevated    Images:  Findings:     Osseous structures  Osseous structures:      Right: There is avascular necrosis of the femoral head with combined  necrotic angle measured at the mid sagittal and mid coronal planes  measuring 162 degrees. No MRI evidence of articular surface collapse.     Left: There is avascular necrosis of the femoral head with combined  necrotic angle measured at the mid sagittal and mid coronal planes  measuring 153 degrees. No MRI evidence of articular surface collapse.     No acute osseous abnormality. Partially elongation of bilateral L5  transverse processes.     Internal derangement of joints are not well assessed owing to chosen  field of view.     Joint and Periarticular soft tissue:     Sacroiliac joints and pubic symphysis are congruent. Mild right  sacroiliac joint subchondral edema, likely degenerative.     Joint effusion: A physiologic amount of joint fluid in bilateral hip.     Bursal effusion: Minimal nonspecific edema over the greater  trochanter. No substantial iliopsoas or trochanteric bursal effusion.     Muscles and tendons  Muscles and tendons: Proximal hamstrings, rectus femoris, sartorius,  and iliopsoas tendons intact bilaterally. The hip abductors are intact  bilaterally. The visualized adductor muscles are unremarkable  bilaterally.      Nerves:  The visualized course of the sciatic nerves are  unremarkable  bilaterally.     Other Findings:  Simple appearing left adnexal region cyst measuring up to 4.4 cm,  similar to CT 8/24/2022. No substantial free fluid in the pelvis.                                                                      Impression:     1.  Right: Femoral head avascular necrosis with Kerboul combined  necrotic ankle measuring 162 degrees, which are considered at low risk  for future collapse.  No MR evidence of collapse.     2. Left: Femoral head avascular necrosis with Kerboul combined  necrotic ankle measuring 153 degrees, which are considered at low risk  for future collapse.  No MR evidence of collapse.         A: AVN both hips    P:  Discussed core decompression, anticoagulation.  Patient will consider her options and let us know how she'd like to proceed.  See back as preop            In addition to the above assessment and plan each active problem on Sherri's problem list was evaluated today. This included the questioning of Sherri for any medication problems. We will continue the current treatment plan for these active problems except as noted.

## 2022-10-18 NOTE — LETTER
10/18/2022         RE: Sherri Lynn  [address]  [place] [address]        Dear Colleague,    Thank you for referring your patient, Sherri Lynn, to the [facility] [place]. Please see a copy of my visit note below.    S: Patient is a 36 year old seen today in follow up for bilateral hip pain.    They were previously evaluated on 10/4/22. Since this visit they report pain in right hip is worse. They have tried the following therapies: ibuprofen, Tumeric, fish oil, Voltaren gel, lidocaine cream, essential oil, heat, changing positions.    Current pain level: 6/10         Patient Active Problem List   Diagnosis     CARDIOVASCULAR SCREENING; LDL GOAL LESS THAN 160     Crohn's disease (H)     Carpal tunnel syndrome     Intermittent asthma     Administrative encounter     Lactose intolerance in adult     Inflammatory arthritis     Current severe episode of major depressive disorder without psychotic features without prior episode (H)     Hidradenitis suppurativa     Morbid obesity (H)     History of carpal tunnel release     Diabetes mellitus, type 2 (H)     Diabetes mellitus treated with oral medication (H)     Biliary colic     Bony sclerosis     Hepatic steatosis     S/P cholecystectomy     Acquired hypothyroidism            Past Medical History:   Diagnosis Date     Asthma      Crohn's ileitis, unspecified complication (H)             Past Surgical History:   Procedure Laterality Date     LAPAROSCOPIC CHOLECYSTECTOMY N/A 8/25/2022    Procedure: CHOLECYSTECTOMY, LAPAROSCOPIC;  Surgeon: Zechariah Huang MD;  Location: [facility] OR     RELEASE CARPAL TUNNEL Left 2014            Social History     Tobacco Use     Smoking status: Never     Smokeless tobacco: Never   Substance Use Topics     Alcohol use: Yes            Family History   Problem Relation Age of Onset     Asthma Mother      Hypertension Mother      Asthma Sister      Asthma Brother                Allergies    Allergen Reactions     Adhesive Tape      Augmentin      Azathioprine      Azathioprine Other (See Comments)     pancreatitits     Certolizumab Pegol Hives     Flu Virus Vaccine      Humira Swelling     Keflex [Cephalexin Hcl]      Pneumococcal Vaccines      Sulfa Drugs Itching     rashes     Vedolizumab Fatigue, Headache, Nausea and Photosensitivity     Zithromax [Azithromycin Dihydrate]      Penicillins Rash            Current Outpatient Medications   Medication Sig Dispense Refill     albuterol (PROAIR HFA/PROVENTIL HFA/VENTOLIN HFA) 108 (90 Base) MCG/ACT inhaler Inhale 2 puffs into the lungs every 6 hours 18 g 3     alcohol swab prep pads Use to swab area of injection/jaye as directed. 100 each 3     aspirin 81 MG EC tablet TAKE 1 TABLET BY MOUTH EVERY DAY (Patient not taking: No sig reported)       atorvastatin (LIPITOR) 40 MG tablet Take 1 tablet (40 mg) by mouth daily 90 tablet 4     blood glucose (NO BRAND SPECIFIED) test strip Use to test blood sugar twice daily   times daily or as directed. To accompany: Blood Glucose Monitor Brands: per insurance. 100 strip 6     blood glucose calibration (NO BRAND SPECIFIED) solution To accompany: Blood Glucose Monitor Brands: per insurance. 1 each 4     blood glucose monitoring (NO BRAND SPECIFIED) meter device kit Use to test blood sugar bid  times daily or as directed. Preferred blood glucose meter OR supplies to accompany: Blood Glucose Monitor Brands: per insurance. 1 kit 0     Continuous Blood Gluc Sensor (FREESTYLE KHADIJAH 2 SENSOR) MISC 1 Device every 14 days 6 each 3     diphenhydrAMINE HCl 12.5 MG CHEW Take 12.5 mg by mouth daily as needed       escitalopram (LEXAPRO) 20 MG tablet Take 1 tablet (20 mg) by mouth daily 90 tablet 3     etonogestrel (NEXPLANON) 68 MG IMPL 1 each       levothyroxine (SYNTHROID/LEVOTHROID) 112 MCG tablet Take 1 tablet (112 mcg) by mouth daily 90 tablet 1     metFORMIN (GLUCOPHAGE XR) 500 MG 24 hr tablet Take 4 tablets (2,000 mg) by  mouth daily (with dinner) for 90 days (Patient taking differently: Take 1,000 mg by mouth 2 times daily (with meals)) 360 tablet 4     multivitamin w/minerals (THERA-VIT-M) tablet Take 1 tablet by mouth daily       ondansetron (ZOFRAN ODT) 4 MG ODT tab Take 1 tablet (4 mg) by mouth every 8 hours as needed for nausea 10 tablet 0     Probiotic Product (PROBIOTIC-10 ULTIMATE) CAPS Take 1 capsule by mouth daily       thin (NO BRAND SPECIFIED) lancets Use with lanceting device BID. To accompany: Blood Glucose Monitor Brands: per insurance. 100 each 6          Review Of Systems  Skin: negative  Eyes: negative  Ears/Nose/Throat: negative  Respiratory: No shortness of breath, dyspnea on exertion, cough, or hemoptysis    O:Physical Exam:  Adequate rotation hips.  ROM symmetric.  CMS intact to both lower extremities.    Lab:  TSH elevated, HgbA1C elevated    Images:  Findings:     Osseous structures  Osseous structures:      Right: There is avascular necrosis of the femoral head with combined  necrotic angle measured at the mid sagittal and mid coronal planes  measuring 162 degrees. No MRI evidence of articular surface collapse.     Left: There is avascular necrosis of the femoral head with combined  necrotic angle measured at the mid sagittal and mid coronal planes  measuring 153 degrees. No MRI evidence of articular surface collapse.     No acute osseous abnormality. Partially elongation of bilateral L5  transverse processes.     Internal derangement of joints are not well assessed owing to chosen  field of view.     Joint and Periarticular soft tissue:     Sacroiliac joints and pubic symphysis are congruent. Mild right  sacroiliac joint subchondral edema, likely degenerative.     Joint effusion: A physiologic amount of joint fluid in bilateral hip.     Bursal effusion: Minimal nonspecific edema over the greater  trochanter. No substantial iliopsoas or trochanteric bursal effusion.     Muscles and tendons  Muscles and  tendons: Proximal hamstrings, rectus femoris, sartorius,  and iliopsoas tendons intact bilaterally. The hip abductors are intact  bilaterally. The visualized adductor muscles are unremarkable  bilaterally.      Nerves:  The visualized course of the sciatic nerves are unremarkable  bilaterally.     Other Findings:  Simple appearing left adnexal region cyst measuring up to 4.4 cm,  similar to CT 8/24/2022. No substantial free fluid in the pelvis.                                                                      Impression:     1.  Right: Femoral head avascular necrosis with Kerboul combined  necrotic ankle measuring 162 degrees, which are considered at low risk  for future collapse.  No MR evidence of collapse.     2. Left: Femoral head avascular necrosis with Kerboul combined  necrotic ankle measuring 153 degrees, which are considered at low risk  for future collapse.  No MR evidence of collapse.         A: AVN both hips    P:  Discussed core decompression, anticoagulation.  Patient will consider her options and let us know how she'd like to proceed.  See back as preop            In addition to the above assessment and plan each active problem on Sherri's problem list was evaluated today. This included the questioning of Sherri for any medication problems. We will continue the current treatment plan for these active problems except as noted.        Again, thank you for allowing me to participate in the care of your patient.        Sincerely,        DEV LAST MD

## 2022-10-26 PROBLEM — Z79.84 DIABETES MELLITUS TREATED WITH ORAL MEDICATION (H): Status: RESOLVED | Noted: 2022-08-08 | Resolved: 2022-10-26

## 2022-10-26 PROBLEM — E11.9 DIABETES MELLITUS TREATED WITH ORAL MEDICATION (H): Status: RESOLVED | Noted: 2022-08-08 | Resolved: 2022-10-26

## 2022-11-03 ENCOUNTER — TELEPHONE (OUTPATIENT)
Dept: ORTHOPEDICS | Facility: CLINIC | Age: 37
End: 2022-11-03

## 2022-11-03 NOTE — TELEPHONE ENCOUNTER
This writer contacted patient regarding scheduling surgery with Dr. Montalvo, patient states she will call back once she has her new insurance.

## 2023-01-24 DIAGNOSIS — E03.9 ACQUIRED HYPOTHYROIDISM: ICD-10-CM

## 2023-01-24 RX ORDER — LEVOTHYROXINE SODIUM 112 UG/1
TABLET ORAL
Qty: 90 TABLET | Refills: 1 | Status: SHIPPED | OUTPATIENT
Start: 2023-01-24 | End: 2023-08-07

## 2023-01-24 NOTE — TELEPHONE ENCOUNTER
"Routing refill request to provider for review/approval because:  Labs out of range:  tsh    Last Written Prescription Date:  8/8/22  Last Fill Quantity: 90,  # refills: 1   Last office visit provider:  8/29/22     Requested Prescriptions   Pending Prescriptions Disp Refills     levothyroxine (SYNTHROID/LEVOTHROID) 112 MCG tablet [Pharmacy Med Name: LEVOTHYROXINE 112 MCG TABLET] 90 tablet 1     Sig: TAKE 1 TABLET BY MOUTH DAILY       Thyroid Protocol Failed - 1/24/2023 12:11 AM        Failed - Normal TSH on file in past 12 months     Recent Labs   Lab Test 10/18/22  0857   TSH 5.09*              Passed - Patient is 12 years or older        Passed - Recent (12 mo) or future (30 days) visit within the authorizing provider's specialty     Patient has had an office visit with the authorizing provider or a provider within the authorizing providers department within the previous 12 mos or has a future within next 30 days. See \"Patient Info\" tab in inbasket, or \"Choose Columns\" in Meds & Orders section of the refill encounter.              Passed - Medication is active on med list        Passed - No active pregnancy on record     If patient is pregnant or has had a positive pregnancy test, please check TSH.          Passed - No positive pregnancy test in past 12 months     If patient is pregnant or has had a positive pregnancy test, please check TSH.               Kayla Do RN 01/24/23 4:18 PM  "

## 2023-04-02 ENCOUNTER — HEALTH MAINTENANCE LETTER (OUTPATIENT)
Age: 38
End: 2023-04-02

## 2023-06-01 ENCOUNTER — HEALTH MAINTENANCE LETTER (OUTPATIENT)
Age: 38
End: 2023-06-01

## 2023-08-06 DIAGNOSIS — E03.9 ACQUIRED HYPOTHYROIDISM: ICD-10-CM

## 2023-08-07 RX ORDER — LEVOTHYROXINE SODIUM 112 UG/1
112 TABLET ORAL DAILY
Qty: 90 TABLET | Refills: 1 | Status: SHIPPED | OUTPATIENT
Start: 2023-08-07

## 2023-08-07 NOTE — TELEPHONE ENCOUNTER
"Routing refill request to provider for review/approval because:  Labs out of range:  TSH    Last Written Prescription Date:  1/24/2023  Last Fill Quantity: 90,  # refills: 1   Last office visit provider:  8/29/2022     Requested Prescriptions   Pending Prescriptions Disp Refills    levothyroxine (SYNTHROID/LEVOTHROID) 112 MCG tablet [Pharmacy Med Name: LEVOTHYROXINE 112 MCG TABLET] 90 tablet 1     Sig: TAKE 1 TABLET BY MOUTH EVERY DAY       Thyroid Protocol Failed - 8/6/2023  7:55 AM        Failed - Normal TSH on file in past 12 months     Recent Labs   Lab Test 10/18/22  0857   TSH 5.09*              Passed - Patient is 12 years or older        Passed - Recent (12 mo) or future (30 days) visit within the authorizing provider's specialty     Patient has had an office visit with the authorizing provider or a provider within the authorizing providers department within the previous 12 mos or has a future within next 30 days. See \"Patient Info\" tab in inbasket, or \"Choose Columns\" in Meds & Orders section of the refill encounter.              Passed - Medication is active on med list        Passed - No active pregnancy on record     If patient is pregnant or has had a positive pregnancy test, please check TSH.          Passed - No positive pregnancy test in past 12 months     If patient is pregnant or has had a positive pregnancy test, please check TSH.               Brianne Braden RN 08/06/23 7:16 PM  "

## 2023-08-18 DIAGNOSIS — F32.2 CURRENT SEVERE EPISODE OF MAJOR DEPRESSIVE DISORDER WITHOUT PSYCHOTIC FEATURES WITHOUT PRIOR EPISODE (H): ICD-10-CM

## 2023-08-18 NOTE — TELEPHONE ENCOUNTER
"Routing refill request to provider for review/approval because:  Patient needs to be seen because:  Past due for 6 month follow up on depression.  PHQ-9 score 11 on 8/29/2022    Last Written Prescription Date:  6/1/2022  Last Fill Quantity: 90,  # refills: 3   Last office visit provider:  8/29/2022     Requested Prescriptions   Pending Prescriptions Disp Refills    escitalopram (LEXAPRO) 20 MG tablet [Pharmacy Med Name: ESCITALOPRAM 20 MG TABLET] 90 tablet 1     Sig: TAKE 1 TABLET BY MOUTH EVERY DAY       SSRIs Protocol Failed - 8/18/2023  4:02 PM        Failed - PHQ-9 score less than 5 in past 6 months     Please review last PHQ-9 score.           Failed - Recent (6 mo) or future (30 days) visit within the authorizing provider's specialty     Patient had office visit in the last 6 months or has a visit in the next 30 days with authorizing provider or within the authorizing provider's specialty.  See \"Patient Info\" tab in inbasket, or \"Choose Columns\" in Meds & Orders section of the refill encounter.            Passed - Medication is active on med list        Passed - Patient is age 18 or older        Passed - No active pregnancy on record        Passed - No positive pregnancy test in last 12 months             Erinn Amos RN 08/18/23 4:04 PM  "

## 2023-08-21 RX ORDER — ESCITALOPRAM OXALATE 20 MG/1
20 TABLET ORAL DAILY
Qty: 90 TABLET | Refills: 1 | Status: SHIPPED | OUTPATIENT
Start: 2023-08-21 | End: 2024-02-05

## 2023-09-02 ENCOUNTER — HEALTH MAINTENANCE LETTER (OUTPATIENT)
Age: 38
End: 2023-09-02

## 2024-01-20 ENCOUNTER — HEALTH MAINTENANCE LETTER (OUTPATIENT)
Age: 39
End: 2024-01-20

## 2024-02-03 DIAGNOSIS — F32.2 CURRENT SEVERE EPISODE OF MAJOR DEPRESSIVE DISORDER WITHOUT PSYCHOTIC FEATURES WITHOUT PRIOR EPISODE (H): ICD-10-CM

## 2024-02-05 RX ORDER — ESCITALOPRAM OXALATE 20 MG/1
20 TABLET ORAL DAILY
Qty: 90 TABLET | Refills: 1 | Status: SHIPPED | OUTPATIENT
Start: 2024-02-05

## 2024-06-08 ENCOUNTER — HEALTH MAINTENANCE LETTER (OUTPATIENT)
Age: 39
End: 2024-06-08

## 2024-10-26 ENCOUNTER — HEALTH MAINTENANCE LETTER (OUTPATIENT)
Age: 39
End: 2024-10-26

## 2024-11-08 NOTE — PATIENT INSTRUCTIONS
R Axilla abscess - doxycycline for 10 days, continue hot compresses to promote drainage.  Follow up with pcp in one  Week.  ?start of hidradenitis supportiva    
No

## 2025-02-23 ENCOUNTER — HEALTH MAINTENANCE LETTER (OUTPATIENT)
Age: 40
End: 2025-02-23

## 2025-05-25 ENCOUNTER — HEALTH MAINTENANCE LETTER (OUTPATIENT)
Age: 40
End: 2025-05-25

## 2025-06-15 ENCOUNTER — HEALTH MAINTENANCE LETTER (OUTPATIENT)
Age: 40
End: 2025-06-15

## (undated) DEVICE — SUCTION IRR STRYKERFLOW II W/TIP 250-070-520

## (undated) DEVICE — SU VICRYL 0 CT-2 CR 8X18" J727D

## (undated) DEVICE — SU VICRYL 4-0 P-3 18" UND J494G

## (undated) DEVICE — SOL NACL 0.9% IRRIG 3000ML BAG 2B7477

## (undated) DEVICE — ENDO POUCH UNIV RETRIEVAL SYSTEM INZII 10MM CD001

## (undated) DEVICE — ESU PENCIL W/HOLSTER E2350H

## (undated) DEVICE — ESU ELEC BLADE 2.75" COATED/INSULATED E1455

## (undated) DEVICE — ESU CORD MONOPOLAR 10'  E0510

## (undated) DEVICE — SOL NACL 0.9% IRRIG 1000ML BOTTLE 2F7124

## (undated) DEVICE — GLOVE PROTEXIS POWDER FREE 8.0 ORTHOPEDIC 2D73ET80

## (undated) DEVICE — LINEN FULL SHEET 5511

## (undated) DEVICE — SUCTION CANISTER MEDIVAC LINER 3000ML W/LID 65651-530

## (undated) DEVICE — LINEN POUCH DBL 5427

## (undated) DEVICE — ENDO TROCAR OPTICAL ACCESS KII Z-THRD 12X100MM C0R85

## (undated) DEVICE — BAG CLEAR TRASH 1.3M 39X33" P4040C

## (undated) DEVICE — LINEN HALF SHEET 5512

## (undated) DEVICE — ESU GROUND PAD ADULT W/CORD E7507

## (undated) DEVICE — CLIP APPLIER ENDO 5MM M/L LIGAMAX EL5ML

## (undated) DEVICE — Device

## (undated) DEVICE — ENDO TROCAR SLEEVE KII Z-THREADED 05X100MM CTS02

## (undated) DEVICE — ENDO TROCAR FIRST ENTRY KII FIOS Z-THRD 05X100MM CTF03

## (undated) DEVICE — DECANTER VIAL 2006S

## (undated) DEVICE — GLOVE PROTEXIS POWDER FREE SMT 7.5  2D72PT75X

## (undated) DEVICE — LINEN TOWEL PACK X5 5464

## (undated) RX ORDER — KETOROLAC TROMETHAMINE 30 MG/ML
INJECTION, SOLUTION INTRAMUSCULAR; INTRAVENOUS
Status: DISPENSED
Start: 2022-08-25

## (undated) RX ORDER — PROPOFOL 10 MG/ML
INJECTION, EMULSION INTRAVENOUS
Status: DISPENSED
Start: 2022-08-25

## (undated) RX ORDER — FENTANYL CITRATE 50 UG/ML
INJECTION, SOLUTION INTRAMUSCULAR; INTRAVENOUS
Status: DISPENSED
Start: 2022-08-25

## (undated) RX ORDER — DEXAMETHASONE SODIUM PHOSPHATE 4 MG/ML
INJECTION, SOLUTION INTRA-ARTICULAR; INTRALESIONAL; INTRAMUSCULAR; INTRAVENOUS; SOFT TISSUE
Status: DISPENSED
Start: 2022-08-25

## (undated) RX ORDER — ACETAMINOPHEN 325 MG/1
TABLET ORAL
Status: DISPENSED
Start: 2022-08-25

## (undated) RX ORDER — ONDANSETRON 2 MG/ML
INJECTION INTRAMUSCULAR; INTRAVENOUS
Status: DISPENSED
Start: 2022-08-25

## (undated) RX ORDER — INDOCYANINE GREEN AND WATER 25 MG
KIT INJECTION
Status: DISPENSED
Start: 2022-08-25

## (undated) RX ORDER — BUPIVACAINE HYDROCHLORIDE AND EPINEPHRINE 5; 5 MG/ML; UG/ML
INJECTION, SOLUTION EPIDURAL; INTRACAUDAL; PERINEURAL
Status: DISPENSED
Start: 2022-08-25

## (undated) RX ORDER — TRIAMCINOLONE ACETONIDE 40 MG/ML
INJECTION, SUSPENSION INTRA-ARTICULAR; INTRAMUSCULAR
Status: DISPENSED
Start: 2022-09-27

## (undated) RX ORDER — OXYCODONE HYDROCHLORIDE 5 MG/1
TABLET ORAL
Status: DISPENSED
Start: 2022-08-25

## (undated) RX ORDER — CLINDAMYCIN PHOSPHATE 900 MG/50ML
INJECTION, SOLUTION INTRAVENOUS
Status: DISPENSED
Start: 2022-08-25